# Patient Record
Sex: FEMALE | Race: WHITE | NOT HISPANIC OR LATINO | Employment: OTHER | URBAN - METROPOLITAN AREA
[De-identification: names, ages, dates, MRNs, and addresses within clinical notes are randomized per-mention and may not be internally consistent; named-entity substitution may affect disease eponyms.]

---

## 2019-01-02 ENCOUNTER — DOCUMENTATION (OUTPATIENT)
Dept: OTHER | Facility: HOSPITAL | Age: 64
End: 2019-01-02

## 2019-01-02 NOTE — PROGRESS NOTES
I saw patient with Dr PATINO and patients  - Dr PATINO reviewed the pathology report and surgical options- left breast IB, HG IDC, triple negative - the patient prefers BCT - an MRI has been ordered as well genetic referral. The patients sister was diagnosed with breast cancer at age 45 and did not have genetic testing - educational and supportive materials were reviewed and given - patient consented for the decision study. Dr PATINO discussed the possibility of chemotherapy due to the cancer being triple negative and the patient states she wants to see Dr Braun.-

## 2019-01-07 ENCOUNTER — DOCUMENTATION (OUTPATIENT)
Dept: OTHER | Facility: HOSPITAL | Age: 64
End: 2019-01-07

## 2019-01-07 NOTE — PROGRESS NOTES
Patient called and left message for me to call her @ [phone]. I called and no answer and mail box was full - I called the other number for patient and spoke to her  - he said he would have her call me if he could get her.

## 2019-01-08 ENCOUNTER — APPOINTMENT (OUTPATIENT)
Dept: OTHER | Facility: HOSPITAL | Age: 64
End: 2019-01-08
Attending: SURGERY

## 2019-01-08 ENCOUNTER — HOSPITAL ENCOUNTER (OUTPATIENT)
Dept: MRI IMAGING | Facility: HOSPITAL | Age: 64
Discharge: HOME | End: 2019-01-08
Attending: SURGERY | Admitting: SURGERY

## 2019-01-08 ENCOUNTER — LAB (OUTPATIENT)
Dept: LAB | Facility: HOSPITAL | Age: 64
End: 2019-01-08

## 2019-01-08 ENCOUNTER — CLINICAL SUPPORT (OUTPATIENT)
Dept: GENETICS | Facility: HOSPITAL | Age: 64
End: 2019-01-08

## 2019-01-08 DIAGNOSIS — C50.912 MALIGNANT NEOPLASM OF LEFT BREAST IN FEMALE, ESTROGEN RECEPTOR NEGATIVE, UNSPECIFIED SITE OF BREAST (HCC): Primary | ICD-10-CM

## 2019-01-08 DIAGNOSIS — Z80.0 FAMILY HISTORY OF COLON CANCER: ICD-10-CM

## 2019-01-08 DIAGNOSIS — Z80.3 FAMILY HISTORY OF BREAST CANCER: ICD-10-CM

## 2019-01-08 DIAGNOSIS — C50.812 MALIGNANT NEOPLASM OF OVERLAPPING SITES OF LEFT FEMALE BREAST (HCC): ICD-10-CM

## 2019-01-08 DIAGNOSIS — Z17.1 MALIGNANT NEOPLASM OF LEFT BREAST IN FEMALE, ESTROGEN RECEPTOR NEGATIVE, UNSPECIFIED SITE OF BREAST (HCC): Primary | ICD-10-CM

## 2019-01-08 DIAGNOSIS — Z92.89 H/O MAMMOGRAM: ICD-10-CM

## 2019-01-08 DIAGNOSIS — Z13.79 GENETIC TESTING: Primary | ICD-10-CM

## 2019-01-08 PROCEDURE — 82565 ASSAY OF CREATININE: CPT

## 2019-01-08 PROCEDURE — 96040: CPT | Performed by: GENETIC COUNSELOR, MS

## 2019-01-08 PROCEDURE — A9577 INJ MULTIHANCE: HCPCS | Performed by: SURGERY

## 2019-01-08 PROCEDURE — 0 GADOBENATE DIMEGLUMINE 529 MG/ML SOLUTION: Performed by: SURGERY

## 2019-01-08 PROCEDURE — C8908 MRI W/O FOL W/CONT, BREAST,: HCPCS

## 2019-01-08 PROCEDURE — 77049 MRI BREAST C-+ W/CAD BI: CPT | Performed by: RADIOLOGY

## 2019-01-08 RX ADMIN — GADOBENATE DIMEGLUMINE 18 ML: 529 INJECTION, SOLUTION INTRAVENOUS at 13:44

## 2019-01-08 NOTE — PROGRESS NOTES
Ava Mota, a 63-year-old female, was seen for genetic counseling due to a personal history of breast cancer. Ms. 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 She is making a surgical decision. She retains her uterus and ovaries. She was interested in discussing her risk for a hereditary cancer syndrome.  Ms. Mota was interested in pursuing a multi-gene panel to evaluate her risk of cancer, therefore the CancerNext panel was ordered through [facility] which analyzes BRCA1/2 and 32 additional genes associated with an increased cancer risk. The genes on this panel include APC, JESUS, BARD1, BMPR1A, BRCA1, BRCA2, BRIP1, CDH1, CDK4, CDKN2A, CHEK2, DICER1, EPCAM, GREM1, HOXB13, MLH1, MRE11A, MSH2, MSH6, MUTYH, NBN, NF1, PALB2, PMS2, POLD1, POLE, PTEN, RAD50, RAD51C, RAD51D, SMAD4, SMARCA4, STK11, and TP53. Results are expected within 2-3 weeks.     PERTINENT FAMILY HISTORY: (See attached pedigree)   Sister:     Breast cancer, 45  Sister:     Colon cancer, 56  Brother:    Lung cancer, 57  Mat. 1st cousin:   Breast cancer, 68  Mat. 1st cousin once removed: Breast cancer, 50    We do not have medical records regarding any of these diagnoses.      RISK ASSESSMENT:  Ms. Mota’s personal and family history of cancer led to concern for a hereditary cancer syndrome. We discussed BRCA1/2 testing as well as the option of pursuing a panel that would test for other genes known to impact cancer risk in addition to BRCA1/2.   Ms. Mota clearly meets NCCN guidelines criteria for BRCA1/2 testing based on her personal history of breast cancer and family history of breast cancer in a close relative diagnosed before age 50. These risk assessments are based on the family history information provided at the time of the appointment, and could change in the future should new information be obtained.    GENETIC COUNSELING (30 minutes):  We reviewed the family history information in  detail. Cases of cancer follow three general patterns: sporadic, familial, and hereditary.  While most cancer is sporadic, some cases appear to occur in family clusters.  These cases are said to be familial and account for 10-20% of cancer cases.  Familial cases may be due to a combination of shared genes and environmental factors among family members.  In even fewer families, the cancer is said to be inherited, and the genes responsible for the cancer are known.      Family histories typical of hereditary cancer syndromes usually include multiple first- and second-degree relatives diagnosed with cancer types that define a syndrome.  These cases tend to be diagnosed at younger-than-expected ages and can be bilateral or multifocal.  The cancer in these families follows an autosomal dominant inheritance pattern, which indicates the likely presence of a mutation in a cancer susceptibility gene.  Children and siblings of an individual believed to carry this mutation have a 50% chance of inheriting that mutation, thereby inheriting the increased risk to develop cancer.  These mutations can be passed down from the maternal or the paternal lineage.    Hereditary breast cancer accounts for 5-10% of all cases of breast cancer.  A significant proportion of hereditary breast and ovarian cancer can be attributed to mutations in the BRCA1 and BRCA2 genes.  Mutations in these genes confer an increased risk for breast cancer, ovarian cancer, male breast cancer, prostate cancer, and pancreatic cancer. Women with a BRCA1 or BRCA2 mutation who have already been diagnosed with breast cancer have a 40-60% lifetime risk of a second breast cancer. Women with a BRCA1 or BRCA2 mutation have up to a 44% risk of ovarian cancer.      We discussed that there are other genes that are known to be associated with an increased risk for cancer.  Some of these genes have well defined cancer risks and established management guidelines.  Other genes  that can be tested for have been more recently described, and there may be less data regarding the risks and therefore may not have established management guidelines. We discussed these limitations at length.  Based on Ms. Mota’s desire to get as much information as possible regarding her personal risks and potential risks for her family, she opted to pursue testing through a panel that would look at several other genes known to increase the risk for cancer.    GENETIC TESTING:  The risks, benefits and limitations of genetic testing and implications for clinical management following testing were reviewed.  DNA test results can influence decisions regarding screening, prevention and surgical management.  Genetic testing can have significant psychological implications for both individuals and families.  Also discussed was the possibility of employment and insurance discrimination based on genetic test results and the laws in place to prevent this ([place]).    We discussed panel testing, which would involve testing for BRCA1/2 as well as 32 additional genes that are associated with increased cancer risk. The benefits and limitations of genetic testing were discussed and Ms. Mota decided to pursue testing via the panel. The implications of a positive or negative test result were discussed. We discussed the possibility that, in some cases, genetic test results may be informative or may be ambiguous due to the identification of a genetic variant. These variants may or may not be associated with an increased cancer risk.  With multigene panel testing, it is not uncommon for a variant of uncertain significance (VUS) to be identified.  If a VUS is identified, testing family members is typically not recommended and screening recommendations are made based on the family history.  The laboratories that perform genetic testing work to reclassify the VUS and send out an amended report if and when a VUS is reclassified.  The  majority of variant findings are ultimately reclassified to a negative result.  Given her personal and family history, a negative test result would not eliminate all cancer risk to her relatives, although the risk would not be as high as it would with positive genetic testing.      PLAN: Genetic testing via the CancerNext panel through [facility] was ordered and results are expected within 2-3 weeks. We will call her to discuss these results once they are received. Ms. Mota is welcome to contact us in the meantime with any questions she may have.      Lupe Garza MS, [facility]  Licensed Certified Genetic Counselor

## 2019-01-09 ENCOUNTER — TELEPHONE (OUTPATIENT)
Dept: MRI IMAGING | Facility: HOSPITAL | Age: 64
End: 2019-01-09

## 2019-01-09 LAB — CREAT BLDA-MCNC: 0.7 MG/DL (ref 0.6–1.3)

## 2019-01-09 NOTE — TELEPHONE ENCOUNTER
Called pt with Breast MRI results. Recommended surgical follow up. Pt expressed understanding and was encouraged to call with any further questions or concerns.

## 2019-01-10 ENCOUNTER — LAB REQUISITION (OUTPATIENT)
Dept: LAB | Facility: HOSPITAL | Age: 64
End: 2019-01-10

## 2019-01-10 DIAGNOSIS — C50.812 MALIGNANT NEOPLASM OF OVERLAPPING SITES OF LEFT FEMALE BREAST (HCC): ICD-10-CM

## 2019-01-10 LAB
CYTO UR: NORMAL
LAB AP CASE REPORT: NORMAL
LAB AP DIAGNOSIS COMMENT: NORMAL
PATH REPORT.FINAL DX SPEC: NORMAL
PATH REPORT.GROSS SPEC: NORMAL

## 2019-01-10 PROCEDURE — 88321 CONSLTJ&REPRT SLD PREP ELSWR: CPT | Performed by: SURGERY

## 2019-01-15 ENCOUNTER — DOCUMENTATION (OUTPATIENT)
Dept: GENETICS | Facility: HOSPITAL | Age: 64
End: 2019-01-15

## 2019-01-15 NOTE — PROGRESS NOTES
Ava Mota, a 63-year-old female, was seen for genetic counseling due to a personal history of breast cancer. Ms. 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 She is making a surgical decision. She retains her uterus and ovaries. She was interested in discussing her risk for a hereditary cancer syndrome.  Ms. Mota was interested in pursuing a multi-gene panel to evaluate her risk of cancer, therefore the CancerNext panel was ordered through [facility] which analyzes BRCA1/2 and 32 additional genes associated with an increased cancer risk. The genes on this panel include APC, JESUS, BARD1, BMPR1A, BRCA1, BRCA2, BRIP1, CDH1, CDK4, CDKN2A, CHEK2, DICER1, EPCAM, GREM1, HOXB13, MLH1, MRE11A, MSH2, MSH6, MUTYH, NBN, NF1, PALB2, PMS2, POLD1, POLE, PTEN, RAD50, RAD51C, RAD51D, SMAD4, SMARCA4, STK11, and TP53. Genetic testing was negative for known deleterious mutations in BRCA1/2 and 32 additional genes on the CancerNext panel (See attached).  These normal results were discussed with Ms. Mota by telephone on 1/15/19.      PERTINENT FAMILY HISTORY: (See attached pedigree)   Sister:     Breast cancer, 45  Sister:     Colon cancer, 56  Brother:    Lung cancer, 57  Mat. 1st cousin:   Breast cancer, 68  Mat. 1st cousin once removed: Breast cancer, 50    We do not have medical records regarding any of these diagnoses.      RISK ASSESSMENT:  Ms. Mota’s personal and family history of cancer led to concern for a hereditary cancer syndrome. We discussed BRCA1/2 testing as well as the option of pursuing a panel that would test for other genes known to impact cancer risk in addition to BRCA1/2.   Ms. Mota clearly meets NCCN guidelines criteria for BRCA1/2 testing based on her personal history of breast cancer and family history of breast cancer in a close relative diagnosed before age 50. These risk assessments are based on the family history information provided at the  time of the appointment, and could change in the future should new information be obtained.    GENETIC COUNSELING:  We reviewed the family history information in detail. Cases of cancer follow three general patterns: sporadic, familial, and hereditary.  While most cancer is sporadic, some cases appear to occur in family clusters.  These cases are said to be familial and account for 10-20% of cancer cases.  Familial cases may be due to a combination of shared genes and environmental factors among family members.  In even fewer families, the cancer is said to be inherited, and the genes responsible for the cancer are known.      Family histories typical of hereditary cancer syndromes usually include multiple first- and second-degree relatives diagnosed with cancer types that define a syndrome.  These cases tend to be diagnosed at younger-than-expected ages and can be bilateral or multifocal.  The cancer in these families follows an autosomal dominant inheritance pattern, which indicates the likely presence of a mutation in a cancer susceptibility gene.  Children and siblings of an individual believed to carry this mutation have a 50% chance of inheriting that mutation, thereby inheriting the increased risk to develop cancer.  These mutations can be passed down from the maternal or the paternal lineage.    Hereditary breast cancer accounts for 5-10% of all cases of breast cancer.  A significant proportion of hereditary breast and ovarian cancer can be attributed to mutations in the BRCA1 and BRCA2 genes.  Mutations in these genes confer an increased risk for breast cancer, ovarian cancer, male breast cancer, prostate cancer, and pancreatic cancer. Women with a BRCA1 or BRCA2 mutation who have already been diagnosed with breast cancer have a 40-60% lifetime risk of a second breast cancer. Women with a BRCA1 or BRCA2 mutation have up to a 44% risk of ovarian cancer.      We discussed that there are other genes that are  known to be associated with an increased risk for cancer.  Some of these genes have well defined cancer risks and established management guidelines.  Other genes that can be tested for have been more recently described, and there may be less data regarding the risks and therefore may not have established management guidelines. We discussed these limitations at length.  Based on Ms. Mota’s desire to get as much information as possible regarding her personal risks and potential risks for her family, she opted to pursue testing through a panel that would look at several other genes known to increase the risk for cancer.    GENETIC TESTING:  The risks, benefits and limitations of genetic testing and implications for clinical management following testing were reviewed.  DNA test results can influence decisions regarding screening, prevention and surgical management.  Genetic testing can have significant psychological implications for both individuals and families.  Also discussed was the possibility of employment and insurance discrimination based on genetic test results and the laws in place to prevent this ([place]).    We discussed panel testing, which would involve testing for BRCA1/2 as well as 32 additional genes that are associated with increased cancer risk. The benefits and limitations of genetic testing were discussed and Ms. Mota decided to pursue testing via the panel. The implications of a positive or negative test result were discussed. We discussed the possibility that, in some cases, genetic test results may be informative or may be ambiguous due to the identification of a genetic variant. These variants may or may not be associated with an increased cancer risk.  With multigene panel testing, it is not uncommon for a variant of uncertain significance (VUS) to be identified.  If a VUS is identified, testing family members is typically not recommended and screening recommendations are made based on the  family history.  The laboratories that perform genetic testing work to reclassify the VUS and send out an amended report if and when a VUS is reclassified.  The majority of variant findings are ultimately reclassified to a negative result.  Given her personal and family history, a negative test result would not eliminate all cancer risk to her relatives, although the risk would not be as high as it would with positive genetic testing.      TEST RESULTS:  Genetic testing was negative by sequencing and deletion/duplication testing for mutations in BRCA1/2 and the additional 32 genes on the CancerNext panel.   The negative result greatly lowers but does not eliminate the risk of a hereditary cancer syndrome for Ms. Mota. This assessment is based on the information provided at the time of the consultation.    CLINICAL MANAGEMENT GUIDELINES: Ms. Mota’s management and surveillance should be determined by her oncology team. Per NCCN guidelines, individuals who have a first-degree relative with colon cancer at any age should begin colonoscopy screening at 40 or ten years prior to the youngest diagnosis in the family, and repeat every 5 years or more frequently based on personal clinical findings. Ms. Mota reports she is due for a colonoscopy this year.     Despite the negative genetic test results, Ms. Mota’s female relatives may have a somewhat increased lifetime risk for breast cancer based on family history.  Given the increased risk, options available to individuals with a high lifetime risk for breast cancer were briefly discussed.  This includes increased surveillance and chemoprevention. Surveillance for individuals with a high lifetime risk of breast cancer (>20%, versus the average risk of 12%), based on NCCN guidelines, would consist of semi-annual clinical breast exams and monthly self-breast exams starting by age 18 and annual mammography starting 10 years younger than the earliest diagnosis in a close  relative, or starting by age 40.  According to an American Cancer Society expert panel, annual breast MRI should be offered to women whose lifetime risk of breast cancer is 20-25 percent or more, also starting by age 40 or earlier if indicated by family history.  Female relatives could have a risk assessment performed using a family history-based model, such as the Tyrer-Cuzick model, to determine their individual risks.      PLAN: Genetic counseling remains available to Ms. Mota. She is encouraged to contact us if she has any questions or concerns at [phone].      Lupe Garza MS, [facility] [place], [facility]  Licensed Certified Genetic Counselor     Cc: Ava Angel MD

## 2019-01-24 ENCOUNTER — TRANSCRIBE ORDERS (OUTPATIENT)
Dept: MAMMOGRAPHY | Facility: HOSPITAL | Age: 64
End: 2019-01-24

## 2019-01-24 ENCOUNTER — TRANSCRIBE ORDERS (OUTPATIENT)
Dept: ADMINISTRATIVE | Facility: HOSPITAL | Age: 64
End: 2019-01-24

## 2019-01-24 DIAGNOSIS — C50.812 MALIGNANT NEOPLASM OF OVERLAPPING SITES OF LEFT FEMALE BREAST, UNSPECIFIED ESTROGEN RECEPTOR STATUS (HCC): Primary | ICD-10-CM

## 2019-01-24 DIAGNOSIS — Z17.1 MALIGNANT NEOPLASM OF OVERLAPPING SITES OF LEFT BREAST IN FEMALE, ESTROGEN RECEPTOR NEGATIVE (HCC): Primary | ICD-10-CM

## 2019-01-24 DIAGNOSIS — C50.812 MALIGNANT NEOPLASM OF OVERLAPPING SITES OF LEFT BREAST IN FEMALE, ESTROGEN RECEPTOR NEGATIVE (HCC): Primary | ICD-10-CM

## 2019-02-06 ENCOUNTER — APPOINTMENT (OUTPATIENT)
Dept: PREADMISSION TESTING | Facility: HOSPITAL | Age: 64
End: 2019-02-06

## 2019-02-06 LAB
ALBUMIN SERPL-MCNC: 4.26 G/DL (ref 3.2–4.8)
ALBUMIN/GLOB SERPL: 1.7 G/DL (ref 1.5–2.5)
ALP SERPL-CCNC: 108 U/L (ref 25–100)
ALT SERPL W P-5'-P-CCNC: 22 U/L (ref 7–40)
ANION GAP SERPL CALCULATED.3IONS-SCNC: 4 MMOL/L (ref 3–11)
AST SERPL-CCNC: 20 U/L (ref 0–33)
BILIRUB SERPL-MCNC: 0.8 MG/DL (ref 0.3–1.2)
BUN BLD-MCNC: 18 MG/DL (ref 9–23)
BUN/CREAT SERPL: 22.8 (ref 7–25)
CALCIUM SPEC-SCNC: 9.3 MG/DL (ref 8.7–10.4)
CHLORIDE SERPL-SCNC: 107 MMOL/L (ref 99–109)
CO2 SERPL-SCNC: 30 MMOL/L (ref 20–31)
CREAT BLD-MCNC: 0.79 MG/DL (ref 0.6–1.3)
DEPRECATED RDW RBC AUTO: 42.7 FL (ref 37–54)
ERYTHROCYTE [DISTWIDTH] IN BLOOD BY AUTOMATED COUNT: 13.3 % (ref 11.3–14.5)
GFR SERPL CREATININE-BSD FRML MDRD: 73 ML/MIN/1.73
GLOBULIN UR ELPH-MCNC: 2.4 GM/DL
GLUCOSE BLD-MCNC: 153 MG/DL (ref 70–100)
HCT VFR BLD AUTO: 43.3 % (ref 34.5–44)
HGB BLD-MCNC: 14.1 G/DL (ref 11.5–15.5)
MCH RBC QN AUTO: 28.6 PG (ref 27–31)
MCHC RBC AUTO-ENTMCNC: 32.6 G/DL (ref 32–36)
MCV RBC AUTO: 87.8 FL (ref 80–99)
PLATELET # BLD AUTO: 299 10*3/MM3 (ref 150–450)
PMV BLD AUTO: 9.6 FL (ref 6–12)
POTASSIUM BLD-SCNC: 4.5 MMOL/L (ref 3.5–5.5)
PROT SERPL-MCNC: 6.7 G/DL (ref 5.7–8.2)
RBC # BLD AUTO: 4.93 10*6/MM3 (ref 3.89–5.14)
SODIUM BLD-SCNC: 141 MMOL/L (ref 132–146)
WBC NRBC COR # BLD: 7.15 10*3/MM3 (ref 3.5–10.8)

## 2019-02-06 PROCEDURE — 80053 COMPREHEN METABOLIC PANEL: CPT | Performed by: SURGERY

## 2019-02-06 PROCEDURE — 93010 ELECTROCARDIOGRAM REPORT: CPT | Performed by: INTERNAL MEDICINE

## 2019-02-06 PROCEDURE — 85027 COMPLETE CBC AUTOMATED: CPT | Performed by: SURGERY

## 2019-02-06 PROCEDURE — 36415 COLL VENOUS BLD VENIPUNCTURE: CPT

## 2019-02-06 PROCEDURE — 93005 ELECTROCARDIOGRAM TRACING: CPT

## 2019-02-08 ENCOUNTER — HOSPITAL ENCOUNTER (OUTPATIENT)
Dept: MAMMOGRAPHY | Facility: HOSPITAL | Age: 64
Discharge: HOME | End: 2019-02-08
Attending: SURGERY | Admitting: RADIOLOGY

## 2019-02-08 ENCOUNTER — HOSPITAL ENCOUNTER (OUTPATIENT)
Dept: MAMMOGRAPHY | Facility: HOSPITAL | Age: 64
Discharge: HOME | End: 2019-02-08

## 2019-02-08 ENCOUNTER — HOSPITAL ENCOUNTER (OUTPATIENT)
Dept: ULTRASOUND IMAGING | Facility: HOSPITAL | Age: 64
Discharge: HOME | End: 2019-02-08

## 2019-02-08 ENCOUNTER — LAB REQUISITION (OUTPATIENT)
Dept: LAB | Facility: HOSPITAL | Age: 64
End: 2019-02-08

## 2019-02-08 ENCOUNTER — HOSPITAL ENCOUNTER (OUTPATIENT)
Dept: NUCLEAR MEDICINE | Facility: HOSPITAL | Age: 64
Discharge: HOME | End: 2019-02-08
Attending: SURGERY

## 2019-02-08 DIAGNOSIS — C50.812 MALIGNANT NEOPLASM OF OVERLAPPING SITES OF LEFT BREAST IN FEMALE, ESTROGEN RECEPTOR NEGATIVE (HCC): ICD-10-CM

## 2019-02-08 DIAGNOSIS — Z17.1 MALIGNANT NEOPLASM OF OVERLAPPING SITES OF LEFT BREAST IN FEMALE, ESTROGEN RECEPTOR NEGATIVE (HCC): ICD-10-CM

## 2019-02-08 DIAGNOSIS — C50.812 MALIGNANT NEOPLASM OF OVERLAPPING SITES OF LEFT FEMALE BREAST (HCC): ICD-10-CM

## 2019-02-08 DIAGNOSIS — C50.812 MALIGNANT NEOPLASM OF OVERLAPPING SITES OF LEFT FEMALE BREAST, UNSPECIFIED ESTROGEN RECEPTOR STATUS (HCC): ICD-10-CM

## 2019-02-08 PROCEDURE — 77065 DX MAMMO INCL CAD UNI: CPT | Performed by: RADIOLOGY

## 2019-02-08 PROCEDURE — 88341 IMHCHEM/IMCYTCHM EA ADD ANTB: CPT | Performed by: SURGERY

## 2019-02-08 PROCEDURE — 19285 PERQ DEV BREAST 1ST US IMAG: CPT | Performed by: RADIOLOGY

## 2019-02-08 PROCEDURE — 0 TECHNETIUM FILTERED SULFUR COLLOID: Performed by: SURGERY

## 2019-02-08 PROCEDURE — 88307 TISSUE EXAM BY PATHOLOGIST: CPT | Performed by: SURGERY

## 2019-02-08 PROCEDURE — A9541 TC99M SULFUR COLLOID: HCPCS | Performed by: SURGERY

## 2019-02-08 PROCEDURE — 88342 IMHCHEM/IMCYTCHM 1ST ANTB: CPT | Performed by: SURGERY

## 2019-02-08 PROCEDURE — 88360 TUMOR IMMUNOHISTOCHEM/MANUAL: CPT | Performed by: SURGERY

## 2019-02-08 PROCEDURE — 76098 X-RAY EXAM SURGICAL SPECIMEN: CPT

## 2019-02-08 PROCEDURE — 38792 RA TRACER ID OF SENTINL NODE: CPT

## 2019-02-08 PROCEDURE — 76098 X-RAY EXAM SURGICAL SPECIMEN: CPT | Performed by: RADIOLOGY

## 2019-02-08 RX ORDER — LIDOCAINE HYDROCHLORIDE 10 MG/ML
5 INJECTION, SOLUTION INFILTRATION; PERINEURAL ONCE
Status: COMPLETED | OUTPATIENT
Start: 2019-02-08 | End: 2019-02-08

## 2019-02-08 RX ADMIN — LIDOCAINE HYDROCHLORIDE 5 ML: 10 INJECTION, SOLUTION INFILTRATION; PERINEURAL at 09:45

## 2019-02-08 RX ADMIN — TECHNETIUM TC 99M SULFUR COLLOID 1 DOSE: KIT at 14:35

## 2019-02-08 RX ADMIN — METHYLENE BLUE 10 MG: 10 INJECTION INTRAVENOUS at 09:47

## 2019-02-12 LAB
CYTO UR: NORMAL
LAB AP CASE REPORT: NORMAL
LAB AP CLINICAL INFORMATION: NORMAL
LAB AP SPECIAL STAINS: NORMAL
PATH REPORT.FINAL DX SPEC: NORMAL
PATH REPORT.GROSS SPEC: NORMAL

## 2019-02-13 ENCOUNTER — HOSPITAL ENCOUNTER (OUTPATIENT)
Dept: RADIATION ONCOLOGY | Facility: HOSPITAL | Age: 64
Setting detail: RADIATION/ONCOLOGY SERIES
Discharge: HOME | End: 2019-02-13

## 2019-02-13 ENCOUNTER — OFFICE VISIT (OUTPATIENT)
Dept: RADIATION ONCOLOGY | Facility: HOSPITAL | Age: 64
End: 2019-02-13

## 2019-02-13 DIAGNOSIS — C50.412 MALIGNANT NEOPLASM OF UPPER-OUTER QUADRANT OF LEFT BREAST IN FEMALE, ESTROGEN RECEPTOR NEGATIVE (HCC): Primary | ICD-10-CM

## 2019-02-13 DIAGNOSIS — Z17.1 MALIGNANT NEOPLASM OF UPPER-OUTER QUADRANT OF LEFT BREAST IN FEMALE, ESTROGEN RECEPTOR NEGATIVE (HCC): Primary | ICD-10-CM

## 2019-02-13 PROCEDURE — G0463 HOSPITAL OUTPT CLINIC VISIT: HCPCS

## 2019-02-15 ENCOUNTER — OFFICE VISIT (OUTPATIENT)
Dept: ONCOLOGY | Facility: CLINIC | Age: 64
End: 2019-02-15

## 2019-02-15 VITALS
WEIGHT: 205 LBS | HEART RATE: 81 BPM | TEMPERATURE: 97.4 F | DIASTOLIC BLOOD PRESSURE: 80 MMHG | RESPIRATION RATE: 16 BRPM | HEIGHT: 62 IN | SYSTOLIC BLOOD PRESSURE: 154 MMHG | OXYGEN SATURATION: 97 % | BODY MASS INDEX: 37.73 KG/M2

## 2019-02-15 DIAGNOSIS — C50.412 MALIGNANT NEOPLASM OF UPPER-OUTER QUADRANT OF LEFT BREAST IN FEMALE, ESTROGEN RECEPTOR NEGATIVE (HCC): Primary | ICD-10-CM

## 2019-02-15 DIAGNOSIS — Z17.1 MALIGNANT NEOPLASM OF UPPER-OUTER QUADRANT OF LEFT BREAST IN FEMALE, ESTROGEN RECEPTOR NEGATIVE (HCC): Primary | ICD-10-CM

## 2019-02-15 PROCEDURE — 99205 OFFICE O/P NEW HI 60 MIN: CPT | Performed by: INTERNAL MEDICINE

## 2019-02-15 RX ORDER — PALONOSETRON 0.05 MG/ML
0.25 INJECTION, SOLUTION INTRAVENOUS ONCE
Status: CANCELLED | OUTPATIENT
Start: 2019-03-07

## 2019-02-15 RX ORDER — ONDANSETRON HYDROCHLORIDE 8 MG/1
8 TABLET, FILM COATED ORAL 3 TIMES DAILY PRN
Qty: 30 TABLET | Refills: 5 | Status: SHIPPED | OUTPATIENT
Start: 2019-02-15 | End: 2020-01-02

## 2019-02-15 RX ORDER — DEXAMETHASONE 4 MG/1
TABLET ORAL
Qty: 50 TABLET | Refills: 3 | Status: SHIPPED | OUTPATIENT
Start: 2019-02-15 | End: 2019-05-30

## 2019-02-15 RX ORDER — FLUTICASONE PROPIONATE 50 MCG
SPRAY, SUSPENSION (ML) NASAL
Refills: 0 | COMMUNITY
Start: 2019-02-03

## 2019-02-15 RX ORDER — IRBESARTAN 300 MG/1
300 TABLET ORAL DAILY
Refills: 0 | COMMUNITY
Start: 2019-02-11 | End: 2021-12-17

## 2019-02-15 RX ORDER — VALSARTAN 160 MG/1
160 TABLET ORAL DAILY
Refills: 0 | COMMUNITY
Start: 2019-02-13 | End: 2020-01-02

## 2019-02-15 RX ORDER — SODIUM CHLORIDE 9 MG/ML
250 INJECTION, SOLUTION INTRAVENOUS ONCE
Status: CANCELLED | OUTPATIENT
Start: 2019-03-07

## 2019-02-15 NOTE — PROGRESS NOTES
ID: 63 y.o. year old female from [facility] [id number]    PCP: Liliane Hooper MD    REFERRING PHYSICIAN: Rehan Angel MD    Reason for Consultation: Stage IB triple negative breast cancer of the left breast status post lumpectomy    Dear Dr. PATINO    It is a pleasure to meet Mrs. Mota today.  As you know she is a very pleasant 63-year-old lady that I'm seeing in consultation for her triple negative breast cancer after having a lumpectomy performed.  She reports otherwise been in good health.  She does have some diabetes and hypertension but otherwise no significant chronic medical issues.  Pathology revealed this mass to be 0.6 cm in largest dimension, it did have a highly burton Kelley Thomas score.  It also was negative for estrogen and progesterone receptors and HER-2/jakob negative.  She presents today to discuss adjuvant therapy going forward.  She spoke with Dr. Beverly Rogers who is planning to do adjuvant radiotherapy once we are done with chemotherapy.      Past Medical History:   Diagnosis Date   • Diabetes mellitus (CMS/HCC)    • Hypertension        Past Surgical History:   Procedure Laterality Date   • CARPAL TUNNEL RELEASE Left 2015   •  SECTION      x2   • CHOLECYSTECTOMY  1973   • COLONOSCOPY  2013   • KNEE ARTHROPLASTY Bilateral      &    • TUBAL ABDOMINAL LIGATION  1985       Social History     Socioeconomic History   • Marital status:      Spouse name: Not on file   • Number of children: Not on file   • Years of education: Not on file   • Highest education level: Not on file   Tobacco Use   • Smoking status: Former Smoker     Packs/day: 0.50     Years: 2.00     Pack years: 1.00     Types: Cigarettes     Last attempt to quit: 1972     Years since quittin.1   • Smokeless tobacco: Never Used   Substance and Sexual Activity   • Alcohol use: No     Frequency: Never   • Drug use: No   • Sexual activity: Defer       Family History   Problem  Relation Age of Onset   • Diabetes Mother    • Heart disease Mother    • Stroke Mother    • Emphysema Father    • Breast cancer Sister    • Lung cancer Brother    • Colon cancer Sister        Review of Systems:    16 point review of systems was performed and reviewed and scanned into the EMR    Review of Systems   Constitutional: Negative.    HENT:  Negative.    Eyes: Negative.    Respiratory: Negative.    Cardiovascular: Negative.    Gastrointestinal: Negative.    Endocrine: Negative.    Genitourinary: Negative.     Musculoskeletal: Negative.    Skin: Negative.    Neurological: Negative.    Hematological: Negative.    Psychiatric/Behavioral: Negative.          Current Outpatient Medications:   •  fluticasone (FLONASE) 50 MCG/ACT nasal spray, , Disp: , Rfl: 0  •  HYDROcodone-acetaminophen (NORCO) 5-325 MG per tablet, Take 1-2 tablets by mouth Every 4 to 6 Hours As Needed for Pain, Disp: 14 tablet, Rfl: 0  •  insulin NPH (humuLIN N,novoLIN N) 100 UNIT/ML injection, Inject  under the skin into the appropriate area as directed 2 (Two) Times a Day Before Meals., Disp: , Rfl:   •  irbesartan (AVAPRO) 300 MG tablet, Take 300 mg by mouth Daily., Disp: , Rfl: 0  •  RA ALLERGY RELIEF 10 MG tablet, Take 10 mg by mouth Daily., Disp: , Rfl: 0  •  valsartan (DIOVAN) 160 MG tablet, Take 160 mg by mouth Daily., Disp: , Rfl: 0  •  dexamethasone (DECADRON) 4 MG tablet, Take 2 tablets oral twice a day for 3 consecutive days beginning the day before chemotherapy and continue for 6 doses., Disp: 50 tablet, Rfl: 3  •  ondansetron (ZOFRAN) 8 MG tablet, Take 1 tablet by mouth 3 (Three) Times a Day As Needed for Nausea or Vomiting., Disp: 30 tablet, Rfl: 5    Allergies   Allergen Reactions   • Iodine Anaphylaxis     PT STATES SHE 'PASSED OUT ' AFTER CT CONTRAST.        ECOG SCORE: 0    Objective     Vitals:    02/15/19 1454   BP: 154/80   Pulse: 81   Resp: 16   Temp: 97.4 °F (36.3 °C)   SpO2: 97%       Physical Exam    General: well  appearing, in no acute distress  HEENT: sclera anicteric, oropharynx clear, neck is supple  Lymphatics: no cervical, supraclavicular, or axillary adenopathy  Cardiovascular: regular rate and rhythm, no murmurs, rubs or gallops  Lungs: clear to auscultation bilaterally  Abdomen: soft, nontender, nondistended.  No palpable organomegaly  Extremities: no lower extremity edema  Skin: no rashes, lesions, bruising, or petechiae  Msk:  Shows no weakness of the large muscle groups  Psych: Mood is stable      Lab Results   Component Value Date    GLUCOSE 153 (H) 02/06/2019    BUN 18 02/06/2019    CREATININE 0.79 02/06/2019     02/06/2019    K 4.5 02/06/2019     02/06/2019    CO2 30.0 02/06/2019    CALCIUM 9.3 02/06/2019    PROTEINTOT 6.7 02/06/2019    ALBUMIN 4.26 02/06/2019    BILITOT 0.8 02/06/2019    ALKPHOS 108 (H) 02/06/2019    AST 20 02/06/2019    ALT 22 02/06/2019       Lab Results   Component Value Date    HGB 14.1 02/06/2019    HCT 43.3 02/06/2019    MCV 87.8 02/06/2019     02/06/2019    WBC 7.15 02/06/2019       Mri Breast Bilateral Diagnostic With & Without Contrast    Result Date: 1/8/2019  1. Biopsy-proven 0.5-0.6 cm invasive ductal carcinoma in the far posterior left 3:00 region. There is no evidence of multifocal/multicentric disease in the left breast. 2. Negative right breast MRI. 3. No MRI evidence of lymphadenopathy.  RECOMMENDATIONS: Surgical follow-up with Dr. Angel.   BI-RADS CATEGORY: 6, KNOWN BIOPSY PROVEN MALIGNANCY.  FINAL MRI RESULTS AND RECOMMENDATIONS WILL BE CALLED TO THE PATIENT BY OUR LEAD BREAST MRI TECHNOLOGIST.  This report was finalized on 1/8/2019 4:37 PM by Dr. Patti Quinonez MD.      Nm [place] Node Injection Only    Result Date: 2/8/2019  Radiopharmaceutical filtered sulfur colloid used for the injection for left breast lymphoscintigraphy and sentinel node mapping.  DICTATED:   2/8/2019 EDITED/ls :   2/8/2019    This report was finalized on 2/8/2019 4:42 PM by  Dr. Fish Diaz.      Lab Results   Component Value Date    FINALDX  02/08/2019     1. LEFT BREAST, LUMPECTOMY:  Invasive ductal carcinoma, grade 3 (See synoptic report below).  Tumor measures 0.6 cm in greatest dimension  Tumor extends to within 0.2 cm of the inferior margin.  Single focus of high-grade ductal carcinoma in-situ.   Estrogen receptor negative by immunohistochemistry.   Progesterone receptor negative by immunohistochemistry.   HER-2/jakob negative by immunohistochemistry.   2. LEFT BREAST, EXTENDED MEDIAL MARGIN:  Benign breast tissue.   Negative for in-situ or invasive carcinoma.   3. SENTINEL LYMPH NODE:  One benign sentinel lymph node. (0/1)  Negative for metastatic carcinoma.       INVASIVE BREAST CANCER STAGING TEMPLATE:  TYPE OF SPECIMEN/PROCEDURE:  Oriented lumpectomy  SPECIMEN LATERALITY: Left   TUMOR SITE: 3 o'clock   TUMOR SIZE: (Size of Largest Invasive Carcinoma): 0.6 x 0.4 x 0.2 cm  HISTOLOGIC TYPE OF INVASIVE CARCINOMA:  Invasive carcinoma of no special type (Ductal not otherwise specified)  ndGndRndAndDndEnd:nd nd2nd TUBULAR FORMATION:  3  MITOTIC ACTIVITY:  2  PLEOMORPHISM:  3  OVERALL SCORE: 8/9   DUCTAL CARCINOMA IN SITU (DCIS) EXTENT: Single 3 mm focus present laterally adjacent to tumor.   SURGICAL MARGINS (Uninvolved/positive): Uninvolved by invasive carcinoma   INVASIVE CARCINOMA MARGINS (Uninvolved/Positive) Uninvolved   DISTANCE FROM CLOSEST MARGIN: 0.2 cm  SPECIFIC CLOSEST MARGIN: Anterior margin   DCIS MARGINS (Uninvolved/Positive/No DCIS): Uninvolved by DCIS   DISTANCE FROM CLOSEST MARGIN: 0.4 cm   SPECIFIC CLOSEST MARGIN: Posterior   LYMPH NODES (required only if lymph nodes are present in the specimen):  One benign sentinel lymph node (0/1)  NUMBER OF LYMPH NODES WITH MACROMETASTASIS: 0  NUMBER OF LYMPH NODES WITH MICROMETASTASIS: 0  NUMBER OF LYMPH NODES WITH ISOLATED TUMOR CELLS: 0  TOTAL NUMBER OF LYMPH NODES EXAMINED (Including sentinel nodes): 1  NUMBER OF SENTINEL NODES EXAMINED:  1  VASCULAR/LYMPHATIC INVASION:  Not identified     ESTROGEN RECEPTOR STATUS BY IHC METHOD: Negative  PROGESTERONE RECEPTOR STATUS BY IHC METHOD: Negative   HER-2/jakob ONCOPROTEIN STATUS BY IHC METHOD:  Negative  TREATMENT EFFECT (BREAST): No known presurgical therapy  ADDITIONAL PATHOLOGIC FINDINGS:  None identified   OTHER STUDIES:  Not performed, available upon request   AJCC PATHOLOGIC STAGE:  (COMPLETED BY PATHOLOGIST, BASED ONLY ON TISSUE FINDINGS, MORE EXTENSIVE DISEASE MAY NOT BE KNOWN TO THE PATHOLOGIST)  pT=  1b  pN=  0  STAGE:  IA  JTA/klb            ASSESSMENT:    1. Stage IB triple negative ductal carcinoma of the left breast status post lumpectomy.  I have recommended 4 cycles of Taxotere and Cytoxan as adjuvant therapy in this disease.  Since she is a little bit older and this chemotherapy is moderate risk for neutropenia I'm going to suuport her with Neulasta.  I like to get a baseline CAT scan of the chest abdomen pelvis along with a bone scan since she is a high risk for distant disease.  We discussed the side effects of the treatment itself.  We also discussed the rationale behind adjuvant therapy.  She will need radiotherapy after she's completed this.  I will plan to treat her in [place].  I anticipate that she will tolerate this reasonably well.  I will schedule her to see one of our nurse practitioners to educate her regarding this symptom management off chemotherapy.  Tentatively I plan to start her on March 7th.  I was able to answer all the questions that she had for me today and will see her in my clinic in follow-up with chemotherapy.  If she has any other issues that arise in the interim she can always give me a call.    I spent a total of 60  minutes in direct patient care, greater than 35 minutes (greater than 50%) were spent in coordination of care, and counseling the patient regarding  triple negative breast cancer . Answered any questions patient had with medication and  plan.        Thank you for allowing me to participate in the care of this patient.    Yours sincerely,    Jacy Uribe MD  [facility]  Hematology and Oncology         Orders Placed This Encounter   Procedures   • NM Bone Scan Whole Body     Standing Status:   Future     Standing Expiration Date:   2/15/2020   • CT Abdomen Pelvis Without Contrast     Standing Status:   Future     Standing Expiration Date:   2/15/2020     Order Specific Question:   Will Oral Contrast be needed for this procedure?     Answer:   Yes   • CT Chest Without Contrast     Standing Status:   Future   • Comprehensive metabolic panel     Standing Status:   Future     Standing Expiration Date:   3/6/2020   • CBC and Differential     Standing Status:   Future     Standing Expiration Date:   3/6/2020     Order Specific Question:   Manual Differential     Answer:   No         Please note that portions of this note may have been completed with a voice recognition program. Efforts were made to edit the dictations, but occasionally words are mistranscribed.

## 2019-02-17 NOTE — PROGRESS NOTES
CONSULTATION NOTE    NAME:      Ava Mota  :                                                          1955  DATE OF CONSULTATION:                      2019  REQUESTING PHYSICIAN:                   Rehan Angel MD  REASON FOR CONSULTATION:           Cancer Staging  Malignant neoplasm of upper-outer quadrant of left breast in female, estrogen receptor negative (CMS/HCC)  Staging form: Breast, AJCC 8th Edition  - Clinical stage from 2019: Stage IB (cT1b, cN0, cM0, G3, ER: Negative, CA: Negative, HER2: Negative) - Signed by Jacy Uribe MD on 2/15/2019           BRIEF HISTORY:  Ava Mota  is a very pleasant 63 y.o. female  who had an abnormal mammogram and a biopsy-proven poorly differentiated infiltrating ductal carcinoma in the left 3 o'clock position measuring 6 mm.  She underwent lumpectomy by Dr. PATINO on 2019.  She was found to have a grade 3, high grade 6 mm tumor with margins negative by 2 mm.  The tumor was ER/CA-negative HER-2/jakob negative and 0 of 1 lymph node was positive.  She sees Dr. Braun this afternoon regarding chemotherapy and is here to discuss postoperative radiation.  She has no complaints today and has recovered well from surgery.  Allergies   Allergen Reactions   • Iodine Anaphylaxis     PT STATES SHE 'PASSED OUT ' AFTER CT CONTRAST.        Social History     Tobacco Use   • Smoking status: Former Smoker     Packs/day: 0.50     Years: 2.00     Pack years: 1.00     Types: Cigarettes     Last attempt to quit: 1972     Years since quittin.1   • Smokeless tobacco: Never Used   Substance Use Topics   • Alcohol use: No     Frequency: Never   • Drug use: No         Past Medical History:   Diagnosis Date   • Diabetes mellitus (CMS/HCC)    • Hypertension        family history includes Breast cancer in her sister; Colon cancer in her sister; Diabetes in her mother; Emphysema in her father; Heart disease in her mother; Lung cancer in her brother; Stroke in her  "mother.     Past Surgical History:   Procedure Laterality Date   • CARPAL TUNNEL RELEASE Left 2015   •  SECTION      x2   • CHOLECYSTECTOMY  1973   • COLONOSCOPY  2013   • KNEE ARTHROPLASTY Bilateral      &    • TUBAL ABDOMINAL LIGATION  1985        Review of Systems - Oncology        Objective   VITAL SIGNS: 202 pounds, 63\" height, 180/88 pulse 75    KPS     90%    Physical Exam   Constitutional: She is oriented to person, place, and time. She appears well-developed and well-nourished. No distress.   HENT:   Head: Normocephalic and atraumatic.   Eyes: EOM are normal. No scleral icterus.   Neck: Neck supple.   Cardiovascular: Normal rate and regular rhythm.   Pulmonary/Chest: Effort normal and breath sounds normal. Right breast exhibits no inverted nipple, no mass, no nipple discharge, no skin change and no tenderness. Left breast exhibits skin change. Left breast exhibits no inverted nipple, no mass and no nipple discharge.       Lymphadenopathy:     She has no cervical adenopathy.   Neurological: She is alert and oriented to person, place, and time.   Nursing note and vitals reviewed.           The following portions of the patient's history were reviewed and updated as appropriate: allergies, current medications, past family history, past medical history, past social history, past surgical history and problem list.    Assessment      IMPRESSION:  Mrs. gonzalez has a small, triple negative, high-grade invasive cancer of the left breast she will see Dr. Braun today regarding chemotherapy.      RECOMMENDATIONS: I recommend postoperative radiotherapy to complete her breast conserving treatment.  The pros and cons, risks and benefits of treatment were discussed.  I anticipate 45 Gray in 25 fractions to the left breast and a tumor bed boost of 10 Gray in 5 fractions.  We discussed deep inspiration breath hold and informed consent was obtained.  She will see Dr. Braun today regarding chemotherapy.  " If she undergoes chemotherapy we will see her following for radiation.  Thank you very much for asking me to see Mrs. Mota.      ADDENDUM:  Dr. Braun recommended chemotherapy.  She was instructed to call my nurse Deja upon completion.     Beverly Rogers MD      Errors in dictation may reflect use of voice recognition software and not all errors in transcription may have been detected prior to signing.

## 2019-03-05 ENCOUNTER — OFFICE VISIT (OUTPATIENT)
Dept: ONCOLOGY | Facility: CLINIC | Age: 64
End: 2019-03-05

## 2019-03-05 ENCOUNTER — HOSPITAL ENCOUNTER (OUTPATIENT)
Dept: NUCLEAR MEDICINE | Facility: HOSPITAL | Age: 64
Discharge: HOME | End: 2019-03-05

## 2019-03-05 ENCOUNTER — HOSPITAL ENCOUNTER (OUTPATIENT)
Dept: CT IMAGING | Facility: HOSPITAL | Age: 64
Discharge: HOME | End: 2019-03-05
Admitting: INTERNAL MEDICINE

## 2019-03-05 ENCOUNTER — HOSPITAL ENCOUNTER (OUTPATIENT)
Dept: CT IMAGING | Facility: HOSPITAL | Age: 64
Discharge: HOME | End: 2019-03-05

## 2019-03-05 VITALS
SYSTOLIC BLOOD PRESSURE: 176 MMHG | RESPIRATION RATE: 14 BRPM | WEIGHT: 206 LBS | DIASTOLIC BLOOD PRESSURE: 82 MMHG | HEIGHT: 62 IN | BODY MASS INDEX: 37.91 KG/M2 | HEART RATE: 86 BPM | TEMPERATURE: 97 F

## 2019-03-05 DIAGNOSIS — L65.9 ALOPECIA: ICD-10-CM

## 2019-03-05 DIAGNOSIS — Z17.1 MALIGNANT NEOPLASM OF UPPER-OUTER QUADRANT OF LEFT BREAST IN FEMALE, ESTROGEN RECEPTOR NEGATIVE (HCC): ICD-10-CM

## 2019-03-05 DIAGNOSIS — C50.412 MALIGNANT NEOPLASM OF UPPER-OUTER QUADRANT OF LEFT BREAST IN FEMALE, ESTROGEN RECEPTOR NEGATIVE (HCC): ICD-10-CM

## 2019-03-05 DIAGNOSIS — C50.412 MALIGNANT NEOPLASM OF UPPER-OUTER QUADRANT OF LEFT BREAST IN FEMALE, ESTROGEN RECEPTOR NEGATIVE (HCC): Primary | ICD-10-CM

## 2019-03-05 DIAGNOSIS — Z17.1 MALIGNANT NEOPLASM OF UPPER-OUTER QUADRANT OF LEFT BREAST IN FEMALE, ESTROGEN RECEPTOR NEGATIVE (HCC): Primary | ICD-10-CM

## 2019-03-05 PROCEDURE — 74176 CT ABD & PELVIS W/O CONTRAST: CPT

## 2019-03-05 PROCEDURE — 0 TECHNETIUM MEDRONATE KIT: Performed by: INTERNAL MEDICINE

## 2019-03-05 PROCEDURE — 71250 CT THORAX DX C-: CPT

## 2019-03-05 PROCEDURE — A9503 TC99M MEDRONATE: HCPCS | Performed by: INTERNAL MEDICINE

## 2019-03-05 PROCEDURE — A9270 NON-COVERED ITEM OR SERVICE: HCPCS | Performed by: INTERNAL MEDICINE

## 2019-03-05 PROCEDURE — 78306 BONE IMAGING WHOLE BODY: CPT

## 2019-03-05 PROCEDURE — 63710000001 BARIUM 2 % SUSPENSION: Performed by: INTERNAL MEDICINE

## 2019-03-05 PROCEDURE — 99215 OFFICE O/P EST HI 40 MIN: CPT | Performed by: NURSE PRACTITIONER

## 2019-03-05 RX ORDER — TC 99M MEDRONATE 20 MG/10ML
26.7 INJECTION, POWDER, LYOPHILIZED, FOR SOLUTION INTRAVENOUS
Status: COMPLETED | OUTPATIENT
Start: 2019-03-05 | End: 2019-03-05

## 2019-03-05 RX ADMIN — BARIUM SULFATE 450 ML: 21 SUSPENSION ORAL at 14:14

## 2019-03-05 RX ADMIN — TC 99M MEDRONATE 26.7 MILLICURIE: 20 INJECTION, POWDER, LYOPHILIZED, FOR SOLUTION INTRAVENOUS at 13:00

## 2019-03-05 NOTE — PROGRESS NOTES
CHEMOTHERAPY PREPARATION    Ava Mota  [id number]  1955    Chief Complaint: Management of Triple negative breast cancer    History of present illness:  Ava Mota is a 63 y.o. year old female who is here today for chemotherapy preparation and needs assessment. The patient has been diagnosed with Stage IB triple negative breast cancer of the left breast status post lumpectomy and is scheduled to begin treatment with adjuvant therapy Taxotere and Cytoxan  with Neulasta. However, she recently had a tooth broke off and is scheduled with her dentist for recommendations on 2018  .        .     Oncology History:       Malignant neoplasm of upper-outer quadrant of left breast in female, estrogen receptor negative (CMS/HCC)    1/15/2019 Initial Diagnosis     Malignant neoplasm of upper-outer quadrant of left breast in female, estrogen receptor negative (CMS/HCC)  Abnormal mammogram and a biopsy-proven poorly differentiated infiltrating ductal carcinoma in the left 3 o'clock position measuring 6 mm.                2019 Surgery     Surgery  Lumpectomy by Dr. PATINO on 2019.   Grade 3, high grade 6 mm tumor with margins negative by 2 mm.    Tumor was ER/AL-negative HER-2/jakob negative and 0 of 1 lymph node was positive.                 3/5/2019 Imaging     CT and bone scan scheduled 2019         3/5/2019 -  Chemotherapy     OP BREAST TC DOCEtaxel / Cyclophosphamide  Start date 2019 pending.   Education 2019            Past Medical History:   Diagnosis Date   • Diabetes mellitus (CMS/HCC)    • Hypertension        Past Surgical History:   Procedure Laterality Date   • CARPAL TUNNEL RELEASE Left 2015   •  SECTION      x2   • CHOLECYSTECTOMY  1973   • COLONOSCOPY  2013   • KNEE ARTHROPLASTY Bilateral      &    • TUBAL ABDOMINAL LIGATION  1985       MEDICATIONS: The current medication list was reviewed and reconciled.     Allergies:  is allergic to iodine.    Family  "History   Problem Relation Age of Onset   • Diabetes Mother    • Heart disease Mother    • Stroke Mother    • Emphysema Father    • Breast cancer Sister    • Lung cancer Brother    • Colon cancer Sister          Review of Systems    Physical Exam  Vital Signs: /82   Pulse 86   Temp 97 °F (36.1 °C) (Temporal)   Resp 14   Ht 157.5 cm (62\")   Wt 93.4 kg (206 lb)   BMI 37.68 kg/m²    General Appearance:  alert, cooperative, no apparent distress and appears stated age   Neurologic/Psychiatric: A&O x 3, gait steady, appropriate affect   HEENT:  Normocephalic, without obvious abnormality, mucous membranes moist   Lungs:   Clear to auscultation bilaterally; respirations regular, even, and unlabored bilaterally   Heart:  Regular rate and rhythm, no murmurs appreciated   Extremities: Normal, atraumatic; no clubbing, cyanosis, or edema    Skin: No rashes, lesions, or abnormal coloration noted     ECOG Performance Status: (1) Restricted in physically strenuous activity, ambulatory and able to do work of light nature          NEEDS ASSESSMENTS    Genetics  The patient's new diagnosis and family history have been reviewed for genetic counseling needs. Genetic counseling completed.  Awaiting results.     Psychosocial  The patient has completed a PHQ-9 Depression Screening and the Distress Thermometer (DT) today.   PHQ-9 results show 1-4 (Minimal Depression). The patient scored their distress today as 2 on a scale of 0-10 with 0 being no distress and 10 being extreme distress.   Problems marked by the patient as being an issue for them within the last week include practical problems and physical problems.   Results were reviewed along with psychosocial resources offered by our cancer center. Our oncology social worker will be flagged for a DT score of 4 or above, and a same day call will be made for a score of 9 or 10. A mental health referral is not recommended at this time. The patient is not accepting of a referral " "to [facility].   Copies of patient's questionnaires will be scanned into EMR for details and further reference.    Barriers to care  A barriers form was also completed by the patient today. We discussed services offered by our facility to help her have adequate access to care. The patient was given the name and card for our Oncology Social Worker, Francine Hernández. Based upon barriers assessment today, the patient will require a follow-up call from the  to further discuss needs.   A copy of the barriers form will also be scanned into EMR for details and further reference.     VAD Assessment  The patient and I discussed planned intervenous chemotherapy as well as other IV treatments that are often needed throughout the course of treatment. These may include, but are not limited to blood transfusions, antibiotics, and IV hydration. The vasculature does appear to be adequate for multiple peripheral IVs throughout their treatment course. Discussed risks and benefits of VADs. The patient would not like to pursue Port-A-Cath insertion prior to initiation of treatment.     Advanced Care Planning  The patient and I discussed advanced care planning, \"Conversations that Matter\".   This service was offered, free of charge, for development of advance directives with a certified ACP facilitator.  The patient does not have an up-to-date advanced directive. This document is not on file with our office. The patient is interested in an appointment with one of our facilitators to create or update their advanced directives.      Palliative Care  The patient and I discussed palliative care services. Palliative care is not the same as Hospice care. This is specialized medical care for people living with serious illness with the goal of improving quality of life for the patient and their family. [facility] has partnered with [facility] to offer our patients outpatient palliative care early along with their " treatment to assist in coordination of care, symptom management, pain management, and medical decision making.  Oncology criteria for palliative care referral is not met at this time. The patient is not interested in a palliative care consultation.     Additional Referral needs  Nutrition      CHEMOTHERAPY EDUCATION    Booklets Given: Chemotherapy and You [x]  Eating Hints [x]    Sexuality/Fertility Books []      Chemotherapy/Biotherapy Education Sheets: (list all that apply)  nausea management, acid reflux management, diarrhea management, Cancer resourse contacts information and skin and mouth care                                                                                                                                                                 Chemotherapy Regimen:   Treatment Plans     Name Type Plan dates Plan Provider         Active    OP BREAST TC DOCEtaxel / Cyclophosphamide ONCOLOGY TREATMENT  3/6/2019 - Present Jacy Uribe MD                    TOPICS EDUCATION PROVIDED COMMENTS   ANEMIA:  role of RBC, cause, s/s, ways to manage, role of transfusion [x]    THROMBOCYTOPENIA:  role of platelet, cause, s/s, ways to prevent bleeding, things to avoid, when to seek help [x]    NEUTROPENIA:  role of WBC, cause, infection precautions, s/s of infection, when to call MD [x]    NUTRITION & APPETITE CHANGES:  importance of maintaining healthy diet & weight, ways to manage to improve intake, dietary consult, exercise regimen [x]    DIARRHEA:  causes, s/s of dehydration, ways to manage, dietary changes, when to call MD [x]    CONSTIPATION:  causes, ways to manage, dietary changes, when to call MD [x]    NAUSEA & VOMITING:  cause, use of antiemetics, dietary changes, when to call MD [x]    MOUTH SORES:  causes, oral care, ways to manage [x]    ALOPECIA:  cause, ways to manage, resources [x]    INFERTILITY & SEXUALITY:  causes, fertility preservation options, sexuality changes, ways to manage,  importance of birth control [x]    NERVOUS SYSTEM CHANGES:  causes, s/s, neuropathies, cognitive changes, ways to manage [x]    PAIN:  causes, ways to manage [x] ????   SKIN & NAIL CHANGES:  cause, s/s, ways to manage [x]    ORGAN TOXICITIES:  cause, s/s, need for diagnostic tests, labs, when to notify MD [x]    SURVIVORSHIP:  distress, distress assessment, secondary malignancies, early/late effects, follow-up, social issues, social support [x]    HOME CARE:  use of spill kits, storing of PO chemo, how to manage bodily fluids [x]    MISCELLANEOUS:  drug interactions, administration, vesicant, et [x]        Assessment and Plan:    Diagnoses and all orders for this visit:    Malignant neoplasm of upper-outer quadrant of left breast in female, estrogen receptor negative (CMS/HCC)  -     Cancel: Miscellaneous DME  -     Miscellaneous DME  -     Ambulatory Referral to Social Work  -     Ambulatory Referral to Nutrition Services    Alopecia  -     Miscellaneous DME        This was a 60 minute face-to-face visit with 55 minutes spent in  counseling and coordination of care as documented above.   The patient and I have reviewed their new cancer diagnosis and scheduled treatment plan. Needs assessment was completed including genetics, psychosocial needs, barriers to care, VAD evaluation, advanced care planning, and palliative care services. Referrals have been ordered as appropriate based upon our evaluation and patient desires.     Chemotherapy teaching was also completed today as documented above. Adequate time was given to answer all questions to her satisfaction. Patient and family are aware of their care team members and contact information if they have questions or problems throughout the treatment course. Needs assessments and education has been completed. The patient is adequately prepared to begin treatment as scheduled.       Mlia Pelayo, APRN

## 2019-03-06 ENCOUNTER — TELEPHONE (OUTPATIENT)
Dept: ONCOLOGY | Facility: CLINIC | Age: 64
End: 2019-03-06

## 2019-03-06 NOTE — TELEPHONE ENCOUNTER
Attempted to reach pt at number provided. No answer and VM box full.       Called and left VM on home phone. Let her know Dr Braun is ok with moving forward with treatment as scheduled tomorrow.

## 2019-03-06 NOTE — TELEPHONE ENCOUNTER
Called patient and left message that her Ct Scan came back negative and looking good and to return call if she had any questions.

## 2019-03-06 NOTE — TELEPHONE ENCOUNTER
----- Message from Nimco Brito RN sent at 3/6/2019  3:49 PM EST -----  Regarding: FW: DR JARRETT// CHEMO QUESTION   Contact: [phone]      ----- Message -----  From: Yakov Canales  Sent: 3/6/2019   3:14 PM  To: [facility]  Subject: DR JARRETT// CHEMO QUESTION                          PT CAME IN STATING THAT SHE HAD A TOOTH PULLED TODAY. SHE STARTS CHEMO TOMORROW. SHE WANTS TO MAKE SHE THAT SHE IS STILL ABLE TO START CHEMO. SHE WOULD LIKE CALL BACK.

## 2019-03-07 ENCOUNTER — INFUSION (OUTPATIENT)
Dept: ONCOLOGY | Facility: HOSPITAL | Age: 64
End: 2019-03-07

## 2019-03-07 VITALS
DIASTOLIC BLOOD PRESSURE: 87 MMHG | HEART RATE: 94 BPM | RESPIRATION RATE: 16 BRPM | TEMPERATURE: 98.2 F | WEIGHT: 202 LBS | BODY MASS INDEX: 36.95 KG/M2 | SYSTOLIC BLOOD PRESSURE: 184 MMHG

## 2019-03-07 DIAGNOSIS — Z17.1 MALIGNANT NEOPLASM OF UPPER-OUTER QUADRANT OF LEFT BREAST IN FEMALE, ESTROGEN RECEPTOR NEGATIVE (HCC): Primary | ICD-10-CM

## 2019-03-07 DIAGNOSIS — C50.412 MALIGNANT NEOPLASM OF UPPER-OUTER QUADRANT OF LEFT BREAST IN FEMALE, ESTROGEN RECEPTOR NEGATIVE (HCC): Primary | ICD-10-CM

## 2019-03-07 LAB
ALBUMIN SERPL-MCNC: 4.3 G/DL (ref 3.5–5)
ALBUMIN/GLOB SERPL: 1.3 G/DL (ref 1–2)
ALP SERPL-CCNC: 130 U/L (ref 38–126)
ALT SERPL W P-5'-P-CCNC: 30 U/L (ref 13–69)
ANION GAP SERPL CALCULATED.3IONS-SCNC: 10.9 MMOL/L (ref 10–20)
AST SERPL-CCNC: 28 U/L (ref 15–46)
BASOPHILS # BLD AUTO: 0.03 10*3/MM3 (ref 0–0.2)
BASOPHILS NFR BLD AUTO: 0.4 % (ref 0–2.5)
BILIRUB SERPL-MCNC: 1.3 MG/DL (ref 0.2–1.3)
BUN BLD-MCNC: 13 MG/DL (ref 7–20)
BUN/CREAT SERPL: 21.7 (ref 7.1–23.5)
CALCIUM SPEC-SCNC: 9.8 MG/DL (ref 8.4–10.2)
CHLORIDE SERPL-SCNC: 106 MMOL/L (ref 98–107)
CO2 SERPL-SCNC: 25 MMOL/L (ref 26–30)
CREAT BLD-MCNC: 0.6 MG/DL (ref 0.6–1.3)
DEPRECATED RDW RBC AUTO: 39.9 FL (ref 37–54)
EOSINOPHIL # BLD AUTO: 0 10*3/MM3 (ref 0–0.7)
EOSINOPHIL NFR BLD AUTO: 0 % (ref 0–7)
ERYTHROCYTE [DISTWIDTH] IN BLOOD BY AUTOMATED COUNT: 12.8 % (ref 11.5–14.5)
GFR SERPL CREATININE-BSD FRML MDRD: 100 ML/MIN/1.73
GLOBULIN UR ELPH-MCNC: 3.4 GM/DL
GLUCOSE BLD-MCNC: 313 MG/DL (ref 74–98)
HCT VFR BLD AUTO: 42.3 % (ref 37–47)
HGB BLD-MCNC: 14.2 G/DL (ref 12–16)
IMM GRANULOCYTES # BLD AUTO: 0.06 10*3/MM3 (ref 0–0.06)
IMM GRANULOCYTES NFR BLD AUTO: 0.8 % (ref 0–0.6)
LYMPHOCYTES # BLD AUTO: 0.76 10*3/MM3 (ref 0.6–3.4)
LYMPHOCYTES NFR BLD AUTO: 10 % (ref 10–50)
MCH RBC QN AUTO: 28.8 PG (ref 27–31)
MCHC RBC AUTO-ENTMCNC: 33.6 G/DL (ref 30–37)
MCV RBC AUTO: 85.8 FL (ref 81–99)
MONOCYTES # BLD AUTO: 0.07 10*3/MM3 (ref 0–0.9)
MONOCYTES NFR BLD AUTO: 0.9 % (ref 0–12)
NEUTROPHILS # BLD AUTO: 6.7 10*3/MM3 (ref 2–6.9)
NEUTROPHILS NFR BLD AUTO: 87.9 % (ref 37–80)
NRBC BLD AUTO-RTO: 0 /100 WBC (ref 0–0)
PLATELET # BLD AUTO: 320 10*3/MM3 (ref 130–400)
PMV BLD AUTO: 9.5 FL (ref 6–12)
POTASSIUM BLD-SCNC: 3.9 MMOL/L (ref 3.5–5.1)
PROT SERPL-MCNC: 7.7 G/DL (ref 6.3–8.2)
RBC # BLD AUTO: 4.93 10*6/MM3 (ref 4.2–5.4)
SODIUM BLD-SCNC: 138 MMOL/L (ref 137–145)
WBC NRBC COR # BLD: 7.62 10*3/MM3 (ref 4.8–10.8)

## 2019-03-07 PROCEDURE — 85025 COMPLETE CBC W/AUTO DIFF WBC: CPT

## 2019-03-07 PROCEDURE — 25010000002 PEGFILGRASTIM 6 MG/0.6ML PREFILLED SYRINGE KIT: Performed by: INTERNAL MEDICINE

## 2019-03-07 PROCEDURE — 25010000002 PALONOSETRON PER 25 MCG: Performed by: INTERNAL MEDICINE

## 2019-03-07 PROCEDURE — 96377 APPLICATON ON-BODY INJECTOR: CPT

## 2019-03-07 PROCEDURE — 96417 CHEMO IV INFUS EACH ADDL SEQ: CPT

## 2019-03-07 PROCEDURE — 25010000002 CYCLOPHOSPHAMIDE PER 100 MG: Performed by: INTERNAL MEDICINE

## 2019-03-07 PROCEDURE — 25010000002 DOCETAXEL 20 MG/ML SOLUTION 4 ML VIAL: Performed by: INTERNAL MEDICINE

## 2019-03-07 PROCEDURE — 36415 COLL VENOUS BLD VENIPUNCTURE: CPT

## 2019-03-07 PROCEDURE — 96413 CHEMO IV INFUSION 1 HR: CPT

## 2019-03-07 PROCEDURE — 96375 TX/PRO/DX INJ NEW DRUG ADDON: CPT

## 2019-03-07 PROCEDURE — 80053 COMPREHEN METABOLIC PANEL: CPT

## 2019-03-07 RX ORDER — SODIUM CHLORIDE 9 MG/ML
250 INJECTION, SOLUTION INTRAVENOUS ONCE
Status: DISCONTINUED | OUTPATIENT
Start: 2019-03-07 | End: 2019-03-07 | Stop reason: HOSPADM

## 2019-03-07 RX ORDER — PALONOSETRON 0.05 MG/ML
0.25 INJECTION, SOLUTION INTRAVENOUS ONCE
Status: COMPLETED | OUTPATIENT
Start: 2019-03-07 | End: 2019-03-07

## 2019-03-07 RX ADMIN — DOCETAXEL 145 MG: 20 INJECTION, SOLUTION, CONCENTRATE INTRAVENOUS at 10:42

## 2019-03-07 RX ADMIN — PEGFILGRASTIM 6 MG: KIT SUBCUTANEOUS at 12:17

## 2019-03-07 RX ADMIN — PALONOSETRON HYDROCHLORIDE 0.25 MG: 0.25 INJECTION INTRAVENOUS at 10:11

## 2019-03-07 RX ADMIN — CYCLOPHOSPHAMIDE 1160 MG: 500 INJECTION, POWDER, FOR SOLUTION INTRAVENOUS; ORAL at 11:42

## 2019-03-07 NOTE — PROGRESS NOTES
"Oncology Nutrition Screening    Patient Name:  Ava Mota  YOB: 1955  MRN: [id number]  Date:  03/07/19  Physician:  Brian Parry MD      Type of Cancer Treatment:   Chemotherapy: Docetaxel/Cytoxan    Patient Active Problem List   Diagnosis   • Malignant neoplasm of upper-outer quadrant of left breast in female, estrogen receptor negative (CMS/HCC)       Current Outpatient Medications   Medication Sig Dispense Refill   • dexamethasone (DECADRON) 4 MG tablet Take 2 tablets oral twice a day for 3 consecutive days beginning the day before chemotherapy and continue for 6 doses. 50 tablet 3   • fluticasone (FLONASE) 50 MCG/ACT nasal spray   0   • HYDROcodone-acetaminophen (NORCO) 5-325 MG per tablet Take 1-2 tablets by mouth Every 4 to 6 Hours As Needed for Pain 14 tablet 0   • insulin NPH (humuLIN N,novoLIN N) 100 UNIT/ML injection Inject  under the skin into the appropriate area as directed 2 (Two) Times a Day Before Meals.     • irbesartan (AVAPRO) 300 MG tablet Take 300 mg by mouth Daily.  0   • ondansetron (ZOFRAN) 8 MG tablet Take 1 tablet by mouth 3 (Three) Times a Day As Needed for Nausea or Vomiting. 30 tablet 5   • RA ALLERGY RELIEF 10 MG tablet Take 10 mg by mouth Daily.  0   • valsartan (DIOVAN) 160 MG tablet Take 160 mg by mouth Daily.  0     Current Facility-Administered Medications   Medication Dose Route Frequency Provider Last Rate Last Dose   • sodium chloride 0.9 % infusion 250 mL  250 mL Intravenous Once Jacy Uribe MD           Glycemic Risk:   DM     Weight:   Height: 62\"  Weight: 206 lbs.  BMI: 37.7  Weight change: 1 lb. Gained over 1 month    Oral Food Intake:  Special Diet Restrictions: due to DM    Hydration Status:   How many 8 ounce glass of water of fluid do you drink per day?  6    Enteral Feeding:   N/A    Nutrition Symptoms:   None currently    Activity:   Normal with no limitations     reports that she quit smoking about 47 years ago. Her smoking use included " cigarettes. She has a 1.00 pack-year smoking history. she has never used smokeless tobacco. She reports that she does not drink alcohol or use drugs.    Evaluation of Nutritional Risk:   Patient identified to be at nutritional risk and/or for nutritional consultation; will follow patient through course of treatment.   Diagnosis  Pt. Received handouts from the [employer] [place] and Academy of Nutrition and Dietetics regarding: Quick and Easy Snacks, Foods and Drinks that are easy on the stomach, Nausea/Vomiting diet recommendations, Foods that are high in protein and high in calories, Weight loss nutrition recommendations with recipes, Oncology: Nutrition Tips for Well-Being and Oncology: Cooking Tips. RD encouraged drinking adequate amounts of water daily 8 glasses or 64 ounces and to focus on hydration hourly. RD also discussed the importance of 5-6 small meals daily and trying to snack throughout the day and the use of nutritional supplements between meals. Pt. Has program contact information and has been encouraged to call with questions.            Electronically signed by:  Pily Garcia RD  2:16 PM

## 2019-03-08 ENCOUNTER — TELEPHONE (OUTPATIENT)
Dept: ONCOLOGY | Facility: CLINIC | Age: 64
End: 2019-03-08

## 2019-03-08 NOTE — TELEPHONE ENCOUNTER
Patient was transferred to triage line from the [place] office with questions about neulasta onbody.  Educated patient and she verbalized understanding.

## 2019-03-13 ENCOUNTER — APPOINTMENT (OUTPATIENT)
Dept: RADIATION ONCOLOGY | Facility: HOSPITAL | Age: 64
End: 2019-03-13

## 2019-03-21 ENCOUNTER — TELEPHONE (OUTPATIENT)
Dept: ONCOLOGY | Facility: CLINIC | Age: 64
End: 2019-03-21

## 2019-03-21 NOTE — TELEPHONE ENCOUNTER
Patient called triage line and reported that she is having burning sensation with urination and also having frequency.  Patient also reported that she has a couple of sores in her nose that will occasionally bleed when she blows her nose.  Patient reported that the sores started 4-5 days ago and she has been putting BRUNO on them and they are getting better.  Discussed with patient about going to PCP in her town to have a UA, due to patient living a distance from any of our labs with [facility].  Patient verbalized understanding and will go to PCP for UA.  Advised patient to return call if sores in nose don't improve.

## 2019-03-22 ENCOUNTER — TELEPHONE (OUTPATIENT)
Dept: ONCOLOGY | Facility: CLINIC | Age: 64
End: 2019-03-22

## 2019-03-22 RX ORDER — SULFAMETHOXAZOLE AND TRIMETHOPRIM 800; 160 MG/1; MG/1
TABLET ORAL
Qty: 10 TABLET | Refills: 0 | Status: SHIPPED | OUTPATIENT
Start: 2019-03-22 | End: 2019-05-30

## 2019-03-28 ENCOUNTER — INFUSION (OUTPATIENT)
Dept: ONCOLOGY | Facility: HOSPITAL | Age: 64
End: 2019-03-28

## 2019-03-28 ENCOUNTER — OFFICE VISIT (OUTPATIENT)
Dept: ONCOLOGY | Facility: CLINIC | Age: 64
End: 2019-03-28

## 2019-03-28 VITALS
HEIGHT: 62 IN | SYSTOLIC BLOOD PRESSURE: 146 MMHG | HEART RATE: 79 BPM | WEIGHT: 204 LBS | BODY MASS INDEX: 37.54 KG/M2 | DIASTOLIC BLOOD PRESSURE: 82 MMHG | RESPIRATION RATE: 18 BRPM | TEMPERATURE: 97.9 F

## 2019-03-28 DIAGNOSIS — Z17.1 MALIGNANT NEOPLASM OF UPPER-OUTER QUADRANT OF LEFT BREAST IN FEMALE, ESTROGEN RECEPTOR NEGATIVE (HCC): Primary | ICD-10-CM

## 2019-03-28 DIAGNOSIS — Z17.1 MALIGNANT NEOPLASM OF UPPER-OUTER QUADRANT OF LEFT BREAST IN FEMALE, ESTROGEN RECEPTOR NEGATIVE (HCC): ICD-10-CM

## 2019-03-28 DIAGNOSIS — C50.412 MALIGNANT NEOPLASM OF UPPER-OUTER QUADRANT OF LEFT BREAST IN FEMALE, ESTROGEN RECEPTOR NEGATIVE (HCC): Primary | ICD-10-CM

## 2019-03-28 DIAGNOSIS — C50.412 MALIGNANT NEOPLASM OF UPPER-OUTER QUADRANT OF LEFT BREAST IN FEMALE, ESTROGEN RECEPTOR NEGATIVE (HCC): ICD-10-CM

## 2019-03-28 LAB
ALBUMIN SERPL-MCNC: 4.1 G/DL (ref 3.5–5)
ALBUMIN/GLOB SERPL: 1.4 G/DL (ref 1–2)
ALP SERPL-CCNC: 98 U/L (ref 38–126)
ALT SERPL W P-5'-P-CCNC: 30 U/L (ref 13–69)
ANION GAP SERPL CALCULATED.3IONS-SCNC: 14.5 MMOL/L (ref 10–20)
AST SERPL-CCNC: 21 U/L (ref 15–46)
BASOPHILS # BLD AUTO: 0.02 10*3/MM3 (ref 0–0.2)
BASOPHILS NFR BLD AUTO: 0.2 % (ref 0–1.5)
BILIRUB SERPL-MCNC: 0.9 MG/DL (ref 0.2–1.3)
BUN BLD-MCNC: 19 MG/DL (ref 7–20)
BUN/CREAT SERPL: 23.8 (ref 7.1–23.5)
CALCIUM SPEC-SCNC: 9.5 MG/DL (ref 8.4–10.2)
CHLORIDE SERPL-SCNC: 107 MMOL/L (ref 98–107)
CO2 SERPL-SCNC: 19 MMOL/L (ref 26–30)
CREAT BLD-MCNC: 0.8 MG/DL (ref 0.6–1.3)
DEPRECATED RDW RBC AUTO: 44.7 FL (ref 37–54)
EOSINOPHIL # BLD AUTO: 0 10*3/MM3 (ref 0–0.4)
EOSINOPHIL NFR BLD AUTO: 0 % (ref 0.3–6.2)
ERYTHROCYTE [DISTWIDTH] IN BLOOD BY AUTOMATED COUNT: 14.4 % (ref 12.3–15.4)
GFR SERPL CREATININE-BSD FRML MDRD: 72 ML/MIN/1.73
GLOBULIN UR ELPH-MCNC: 3 GM/DL
GLUCOSE BLD-MCNC: 280 MG/DL (ref 74–98)
HCT VFR BLD AUTO: 37.8 % (ref 34–46.6)
HGB BLD-MCNC: 12.4 G/DL (ref 12–15.9)
IMM GRANULOCYTES # BLD AUTO: 0.09 10*3/MM3 (ref 0–0.05)
IMM GRANULOCYTES NFR BLD AUTO: 1 % (ref 0–0.5)
LYMPHOCYTES # BLD AUTO: 0.72 10*3/MM3 (ref 0.7–3.1)
LYMPHOCYTES NFR BLD AUTO: 8.1 % (ref 19.6–45.3)
MCH RBC QN AUTO: 28.8 PG (ref 26.6–33)
MCHC RBC AUTO-ENTMCNC: 32.8 G/DL (ref 31.5–35.7)
MCV RBC AUTO: 87.9 FL (ref 79–97)
MONOCYTES # BLD AUTO: 0.15 10*3/MM3 (ref 0.1–0.9)
MONOCYTES NFR BLD AUTO: 1.7 % (ref 5–12)
NEUTROPHILS # BLD AUTO: 7.95 10*3/MM3 (ref 1.4–7)
NEUTROPHILS NFR BLD AUTO: 89 % (ref 42.7–76)
NRBC BLD AUTO-RTO: 0 /100 WBC (ref 0–0)
PLATELET # BLD AUTO: 290 10*3/MM3 (ref 140–450)
PMV BLD AUTO: 9.8 FL (ref 6–12)
POTASSIUM BLD-SCNC: 4.5 MMOL/L (ref 3.5–5.1)
PROT SERPL-MCNC: 7.1 G/DL (ref 6.3–8.2)
RBC # BLD AUTO: 4.3 10*6/MM3 (ref 3.77–5.28)
SODIUM BLD-SCNC: 136 MMOL/L (ref 137–145)
WBC NRBC COR # BLD: 8.93 10*3/MM3 (ref 3.4–10.8)

## 2019-03-28 PROCEDURE — 99214 OFFICE O/P EST MOD 30 MIN: CPT | Performed by: NURSE PRACTITIONER

## 2019-03-28 PROCEDURE — 25010000002 DOCETAXEL 20 MG/ML SOLUTION 4 ML VIAL: Performed by: NURSE PRACTITIONER

## 2019-03-28 PROCEDURE — 80053 COMPREHEN METABOLIC PANEL: CPT

## 2019-03-28 PROCEDURE — 25010000002 PEGFILGRASTIM 6 MG/0.6ML PREFILLED SYRINGE KIT: Performed by: NURSE PRACTITIONER

## 2019-03-28 PROCEDURE — 36415 COLL VENOUS BLD VENIPUNCTURE: CPT

## 2019-03-28 PROCEDURE — 85025 COMPLETE CBC W/AUTO DIFF WBC: CPT

## 2019-03-28 PROCEDURE — 96377 APPLICATON ON-BODY INJECTOR: CPT

## 2019-03-28 PROCEDURE — 25010000002 CYCLOPHOSPHAMIDE PER 100 MG: Performed by: NURSE PRACTITIONER

## 2019-03-28 PROCEDURE — 96375 TX/PRO/DX INJ NEW DRUG ADDON: CPT

## 2019-03-28 PROCEDURE — 96417 CHEMO IV INFUS EACH ADDL SEQ: CPT

## 2019-03-28 PROCEDURE — 25010000002 PALONOSETRON PER 25 MCG: Performed by: NURSE PRACTITIONER

## 2019-03-28 PROCEDURE — 96413 CHEMO IV INFUSION 1 HR: CPT

## 2019-03-28 RX ORDER — PALONOSETRON 0.05 MG/ML
0.25 INJECTION, SOLUTION INTRAVENOUS ONCE
Status: COMPLETED | OUTPATIENT
Start: 2019-03-28 | End: 2019-03-28

## 2019-03-28 RX ORDER — SODIUM CHLORIDE 9 MG/ML
250 INJECTION, SOLUTION INTRAVENOUS ONCE
Status: CANCELLED | OUTPATIENT
Start: 2019-03-28

## 2019-03-28 RX ORDER — SODIUM CHLORIDE 9 MG/ML
250 INJECTION, SOLUTION INTRAVENOUS ONCE
Status: DISCONTINUED | OUTPATIENT
Start: 2019-03-28 | End: 2019-03-28 | Stop reason: HOSPADM

## 2019-03-28 RX ORDER — PALONOSETRON 0.05 MG/ML
0.25 INJECTION, SOLUTION INTRAVENOUS ONCE
Status: CANCELLED | OUTPATIENT
Start: 2019-03-28

## 2019-03-28 RX ADMIN — CYCLOPHOSPHAMIDE 1160 MG: 500 INJECTION, POWDER, FOR SOLUTION INTRAVENOUS; ORAL at 13:03

## 2019-03-28 RX ADMIN — PALONOSETRON HYDROCHLORIDE 0.25 MG: 0.25 INJECTION INTRAVENOUS at 11:16

## 2019-03-28 RX ADMIN — DOCETAXEL 145 MG: 20 INJECTION, SOLUTION, CONCENTRATE INTRAVENOUS at 11:59

## 2019-03-28 RX ADMIN — PEGFILGRASTIM 6 MG: KIT SUBCUTANEOUS at 13:33

## 2019-03-29 ENCOUNTER — INFUSION (OUTPATIENT)
Dept: ONCOLOGY | Facility: HOSPITAL | Age: 64
End: 2019-03-29

## 2019-03-29 VITALS — SYSTOLIC BLOOD PRESSURE: 166 MMHG | HEART RATE: 61 BPM | DIASTOLIC BLOOD PRESSURE: 90 MMHG | TEMPERATURE: 97.6 F

## 2019-03-29 PROCEDURE — 25010000002 PEGFILGRASTIM 6 MG/0.6ML PREFILLED SYRINGE KIT: Performed by: INTERNAL MEDICINE

## 2019-03-29 PROCEDURE — 96377 APPLICATON ON-BODY INJECTOR: CPT

## 2019-03-29 RX ADMIN — PEGFILGRASTIM 6 MG: KIT SUBCUTANEOUS at 12:41

## 2019-03-29 NOTE — CODE DOCUMENTATION
Pt here today for Neulasta on body injector application. Reports yesterday approx 6pm she bumped into table and Neulasta device fell off. Reports called DR Whiting on call and was instructed to return to clinic in the AM for new device. Device returned to pharmacy and new device applied.

## 2019-04-02 ENCOUNTER — DOCUMENTATION (OUTPATIENT)
Dept: NUTRITION | Facility: HOSPITAL | Age: 64
End: 2019-04-02

## 2019-04-02 NOTE — PROGRESS NOTES
Nutrition Services    Patient Name:  Ava Mota  YOB: 1955  MRN: [id number]  Admit Date:  (Not on file)    RD phoned pt. For nutritional follow up. LEAH left voicemail on pt.'s phone with nutrition program contact information. LEAH encouraged pt. To call with nutrition questions or any nutrition issues that may arise. LEAH also instructed pt. To let oncology staff know if they have any nutritional issues in the future that we may be of assistance with.     Electronically signed by:  Pily Garcia RD  04/02/19 4:22 PM

## 2019-04-08 ENCOUNTER — TELEPHONE (OUTPATIENT)
Dept: ONCOLOGY | Facility: CLINIC | Age: 64
End: 2019-04-08

## 2019-04-08 RX ORDER — LEVOFLOXACIN 500 MG/1
500 TABLET, FILM COATED ORAL DAILY
Qty: 7 TABLET | Refills: 0 | Status: SHIPPED | OUTPATIENT
Start: 2019-04-08 | End: 2019-04-15

## 2019-04-08 NOTE — TELEPHONE ENCOUNTER
Received call from pt through triage line. Over the weekend- on Saturday, she has developed sore throat, nasal drainage, congestion, and cough. Denies fever. Pt is taking zyrtec and flonase. Suggested she try claritin over zyrtec, because that may help with neulasta side effects as well as allergy symptoms. Also ok to try OTC cold medications such as mucinex and robitussen. Discussed with Dr Braun. Rx escribed for lovenox.

## 2019-04-18 ENCOUNTER — INFUSION (OUTPATIENT)
Dept: ONCOLOGY | Facility: HOSPITAL | Age: 64
End: 2019-04-18

## 2019-04-18 ENCOUNTER — OFFICE VISIT (OUTPATIENT)
Dept: ONCOLOGY | Facility: CLINIC | Age: 64
End: 2019-04-18

## 2019-04-18 VITALS
WEIGHT: 205 LBS | HEART RATE: 88 BPM | RESPIRATION RATE: 14 BRPM | BODY MASS INDEX: 37.73 KG/M2 | SYSTOLIC BLOOD PRESSURE: 162 MMHG | DIASTOLIC BLOOD PRESSURE: 81 MMHG | HEIGHT: 62 IN | TEMPERATURE: 97.2 F

## 2019-04-18 DIAGNOSIS — Z17.1 MALIGNANT NEOPLASM OF UPPER-OUTER QUADRANT OF LEFT BREAST IN FEMALE, ESTROGEN RECEPTOR NEGATIVE (HCC): ICD-10-CM

## 2019-04-18 DIAGNOSIS — Z17.1 MALIGNANT NEOPLASM OF UPPER-OUTER QUADRANT OF LEFT BREAST IN FEMALE, ESTROGEN RECEPTOR NEGATIVE (HCC): Primary | ICD-10-CM

## 2019-04-18 DIAGNOSIS — C50.412 MALIGNANT NEOPLASM OF UPPER-OUTER QUADRANT OF LEFT BREAST IN FEMALE, ESTROGEN RECEPTOR NEGATIVE (HCC): ICD-10-CM

## 2019-04-18 DIAGNOSIS — C50.412 MALIGNANT NEOPLASM OF UPPER-OUTER QUADRANT OF LEFT BREAST IN FEMALE, ESTROGEN RECEPTOR NEGATIVE (HCC): Primary | ICD-10-CM

## 2019-04-18 LAB
ALBUMIN SERPL-MCNC: 3.8 G/DL (ref 3.5–5)
ALBUMIN/GLOB SERPL: 1.3 G/DL (ref 1–2)
ALP SERPL-CCNC: 90 U/L (ref 38–126)
ALT SERPL W P-5'-P-CCNC: 30 U/L (ref 13–69)
ANION GAP SERPL CALCULATED.3IONS-SCNC: 11 MMOL/L (ref 10–20)
AST SERPL-CCNC: 21 U/L (ref 15–46)
BASOPHILS # BLD AUTO: 0.02 10*3/MM3 (ref 0–0.2)
BASOPHILS NFR BLD AUTO: 0.2 % (ref 0–1.5)
BILIRUB SERPL-MCNC: 1.7 MG/DL (ref 0.2–1.3)
BUN BLD-MCNC: 16 MG/DL (ref 7–20)
BUN/CREAT SERPL: 26.7 (ref 7.1–23.5)
CALCIUM SPEC-SCNC: 9.6 MG/DL (ref 8.4–10.2)
CHLORIDE SERPL-SCNC: 105 MMOL/L (ref 98–107)
CO2 SERPL-SCNC: 25 MMOL/L (ref 26–30)
CREAT BLD-MCNC: 0.6 MG/DL (ref 0.6–1.3)
DEPRECATED RDW RBC AUTO: 49.7 FL (ref 37–54)
EOSINOPHIL # BLD AUTO: 0 10*3/MM3 (ref 0–0.4)
EOSINOPHIL NFR BLD AUTO: 0 % (ref 0.3–6.2)
ERYTHROCYTE [DISTWIDTH] IN BLOOD BY AUTOMATED COUNT: 16 % (ref 12.3–15.4)
GFR SERPL CREATININE-BSD FRML MDRD: 100 ML/MIN/1.73
GLOBULIN UR ELPH-MCNC: 2.9 GM/DL
GLUCOSE BLD-MCNC: 272 MG/DL (ref 74–98)
HCT VFR BLD AUTO: 34.7 % (ref 34–46.6)
HGB BLD-MCNC: 11.4 G/DL (ref 12–15.9)
IMM GRANULOCYTES # BLD AUTO: 0.05 10*3/MM3 (ref 0–0.05)
IMM GRANULOCYTES NFR BLD AUTO: 0.6 % (ref 0–0.5)
LYMPHOCYTES # BLD AUTO: 0.65 10*3/MM3 (ref 0.7–3.1)
LYMPHOCYTES NFR BLD AUTO: 7.2 % (ref 19.6–45.3)
MCH RBC QN AUTO: 28.5 PG (ref 26.6–33)
MCHC RBC AUTO-ENTMCNC: 32.9 G/DL (ref 31.5–35.7)
MCV RBC AUTO: 86.8 FL (ref 79–97)
MONOCYTES # BLD AUTO: 0.22 10*3/MM3 (ref 0.1–0.9)
MONOCYTES NFR BLD AUTO: 2.4 % (ref 5–12)
NEUTROPHILS # BLD AUTO: 8.1 10*3/MM3 (ref 1.4–7)
NEUTROPHILS NFR BLD AUTO: 89.6 % (ref 42.7–76)
NRBC BLD AUTO-RTO: 0 /100 WBC (ref 0–0)
PLATELET # BLD AUTO: 349 10*3/MM3 (ref 140–450)
PMV BLD AUTO: 9.4 FL (ref 6–12)
POTASSIUM BLD-SCNC: 4 MMOL/L (ref 3.5–5.1)
PROT SERPL-MCNC: 6.7 G/DL (ref 6.3–8.2)
RBC # BLD AUTO: 4 10*6/MM3 (ref 3.77–5.28)
SODIUM BLD-SCNC: 137 MMOL/L (ref 137–145)
WBC NRBC COR # BLD: 9.04 10*3/MM3 (ref 3.4–10.8)

## 2019-04-18 PROCEDURE — 25010000002 PALONOSETRON PER 25 MCG: Performed by: INTERNAL MEDICINE

## 2019-04-18 PROCEDURE — 25010000002 CYCLOPHOSPHAMIDE PER 100 MG: Performed by: INTERNAL MEDICINE

## 2019-04-18 PROCEDURE — 96413 CHEMO IV INFUSION 1 HR: CPT

## 2019-04-18 PROCEDURE — 96417 CHEMO IV INFUS EACH ADDL SEQ: CPT

## 2019-04-18 PROCEDURE — 99214 OFFICE O/P EST MOD 30 MIN: CPT | Performed by: INTERNAL MEDICINE

## 2019-04-18 PROCEDURE — 25010000002 DOCETAXEL 20 MG/ML SOLUTION 4 ML VIAL: Performed by: INTERNAL MEDICINE

## 2019-04-18 PROCEDURE — 25010000002 PEGFILGRASTIM 6 MG/0.6ML PREFILLED SYRINGE KIT: Performed by: INTERNAL MEDICINE

## 2019-04-18 PROCEDURE — 36415 COLL VENOUS BLD VENIPUNCTURE: CPT

## 2019-04-18 PROCEDURE — 80053 COMPREHEN METABOLIC PANEL: CPT

## 2019-04-18 PROCEDURE — 96377 APPLICATON ON-BODY INJECTOR: CPT

## 2019-04-18 PROCEDURE — 96375 TX/PRO/DX INJ NEW DRUG ADDON: CPT

## 2019-04-18 PROCEDURE — 85025 COMPLETE CBC W/AUTO DIFF WBC: CPT

## 2019-04-18 RX ORDER — PALONOSETRON 0.05 MG/ML
0.25 INJECTION, SOLUTION INTRAVENOUS ONCE
Status: CANCELLED | OUTPATIENT
Start: 2019-04-18

## 2019-04-18 RX ORDER — SODIUM CHLORIDE 9 MG/ML
250 INJECTION, SOLUTION INTRAVENOUS ONCE
Status: DISCONTINUED | OUTPATIENT
Start: 2019-04-18 | End: 2019-04-18 | Stop reason: HOSPADM

## 2019-04-18 RX ORDER — SODIUM CHLORIDE 9 MG/ML
250 INJECTION, SOLUTION INTRAVENOUS ONCE
Status: CANCELLED | OUTPATIENT
Start: 2019-04-18

## 2019-04-18 RX ORDER — PALONOSETRON 0.05 MG/ML
0.25 INJECTION, SOLUTION INTRAVENOUS ONCE
Status: COMPLETED | OUTPATIENT
Start: 2019-04-18 | End: 2019-04-18

## 2019-04-18 RX ADMIN — CYCLOPHOSPHAMIDE 1160 MG: 500 INJECTION, POWDER, FOR SOLUTION INTRAVENOUS; ORAL at 11:52

## 2019-04-18 RX ADMIN — PALONOSETRON HYDROCHLORIDE 0.25 MG: 0.25 INJECTION INTRAVENOUS at 10:31

## 2019-04-18 RX ADMIN — DOCETAXEL 145 MG: 20 INJECTION, SOLUTION, CONCENTRATE INTRAVENOUS at 10:48

## 2019-04-18 RX ADMIN — PEGFILGRASTIM 6 MG: KIT SUBCUTANEOUS at 12:29

## 2019-04-18 NOTE — PROGRESS NOTES
PROBLEM LIST:     Malignant neoplasm of upper-outer quadrant of left breast in female, estrogen receptor negative (CMS/HCC)    1/15/2019 Initial Diagnosis     Malignant neoplasm of upper-outer quadrant of left breast in female, estrogen receptor negative (CMS/HCC)  Abnormal mammogram and a biopsy-proven poorly differentiated infiltrating ductal carcinoma in the left 3 o'clock position measuring 6 mm.                2/8/2019 Surgery     Surgery  Lumpectomy by Dr. PATINO on 2/8/2019.   Grade 3, high grade 6 mm tumor with margins negative by 2 mm.    Tumor was ER/OH-negative HER-2/jakob negative and 0 of 1 lymph node was positive.                 3/5/2019 Imaging     CT and bone scan scheduled 03/05/2019         3/5/2019 -  Chemotherapy     OP BREAST TC DOCEtaxel / Cyclophosphamide  Start date 03/07/2019 pending.   Education 03/05/2019            REASON FOR VISIT: Triple Negative Breast cancer    HISTORY OF PRESENT ILLNESS:   64 y.o.  female presents today for management of triple negative breast cancer.  She is receiving adjuvant chemotherapy with Taxotere and Cytoxan.  She is completed 2 cycles.  She has the usual side effects we see with this regimen including sinus issues and some fatigue.  She has mild neuropathy that seems to resolve prior to the cycle.  Otherwise no infections that is noted.  No fevers over the last 3 weeks.    Past medical history, social history and family history was reviewed and unchanged from prior visit.    Review of Systems:    Review of Systems   Constitutional: Positive for fatigue.   HENT:  Negative.    Eyes: Negative.    Respiratory: Negative.    Cardiovascular: Negative.    Gastrointestinal: Negative.    Endocrine: Negative.    Genitourinary: Negative.     Musculoskeletal: Negative.    Skin: Negative.    Neurological: Negative.    Hematological: Negative.    Psychiatric/Behavioral: Negative.       A comprehensive 14 point review of systems was performed and was negative except as  "mentioned.      Medications:  The current medication list was reviewed in the EMR    ALLERGIES:    Allergies   Allergen Reactions   • Iodine Anaphylaxis     PT STATES SHE 'PASSED OUT ' AFTER CT CONTRAST.          Physical Exam    VITAL SIGNS:  /81   Pulse 88   Temp 97.2 °F (36.2 °C) (Temporal)   Resp 14   Ht 157.5 cm (62\")   Wt 93 kg (205 lb)   BMI 37.49 kg/m²     Wt Readings from Last 3 Encounters:   04/18/19 93 kg (205 lb)   03/28/19 92.5 kg (204 lb)   03/07/19 91.6 kg (202 lb)        Performance Status: 1    General: well appearing, in no acute distress  HEENT: sclera anicteric, oropharynx clear, neck is supple  Lymphatics: no cervical, supraclavicular, or axillary adenopathy  Cardiovascular: regular rate and rhythm, no murmurs, rubs or gallops  Lungs: clear to auscultation bilaterally  Abdomen: soft, nontender, nondistended.  No palpable organomegaly  Extremities: no lower extremity edema  Skin: no rashes, lesions, bruising, or petechiae  Msk:  Shows no weakness of the large muscle groups  Psych: Mood is stable        RECENT LABS:    Lab Results   Component Value Date    HGB 11.4 (L) 04/18/2019    HCT 34.7 04/18/2019    MCV 86.8 04/18/2019     04/18/2019    WBC 9.04 04/18/2019    NEUTROABS 8.10 (H) 04/18/2019    LYMPHSABS 0.65 (L) 04/18/2019    MONOSABS 0.22 04/18/2019    EOSABS 0.00 04/18/2019    BASOSABS 0.02 04/18/2019       Lab Results   Component Value Date    GLUCOSE 280 (H) 03/28/2019    BUN 19 03/28/2019    CREATININE 0.80 03/28/2019     (L) 03/28/2019    K 4.5 03/28/2019     03/28/2019    CO2 19.0 (L) 03/28/2019    CALCIUM 9.5 03/28/2019    PROTEINTOT 7.1 03/28/2019    ALBUMIN 4.10 03/28/2019    BILITOT 0.9 03/28/2019    ALKPHOS 98 03/28/2019    AST 21 03/28/2019    ALT 30 03/28/2019       Ct Abdomen Pelvis Without Contrast    Result Date: 3/5/2019  No evidence of metastatic disease involving the chest, abdomen or pelvis.  This study was performed with techniques to keep " radiation doses as low as reasonably achievable (ALARA). Individualized dose reduction techniques using automated exposure control or adjustment of mA and/or kV according to the patient size were employed.  This report was finalized on 3/5/2019 2:45 PM by Dariel Lan M.D..    Ct Chest Without Contrast    Result Date: 3/5/2019  No evidence of metastatic disease involving the chest, abdomen or pelvis.  This study was performed with techniques to keep radiation doses as low as reasonably achievable (ALARA). Individualized dose reduction techniques using automated exposure control or adjustment of mA and/or kV according to the patient size were employed.  This report was finalized on 3/5/2019 2:45 PM by Dariel Lan M.D..    Nm Bone Scan Whole Body    Result Date: 3/5/2019  No scintigraphic evidence of metastatic disease.   This report was finalized on 3/5/2019 4:11 PM by Dariel Lan M.D..    Mri Breast Bilateral Diagnostic With & Without Contrast    Result Date: 1/8/2019  1. Biopsy-proven 0.5-0.6 cm invasive ductal carcinoma in the far posterior left 3:00 region. There is no evidence of multifocal/multicentric disease in the left breast. 2. Negative right breast MRI. 3. No MRI evidence of lymphadenopathy.  RECOMMENDATIONS: Surgical follow-up with Dr. Angel.   BI-RADS CATEGORY: 6, KNOWN BIOPSY PROVEN MALIGNANCY.  FINAL MRI RESULTS AND RECOMMENDATIONS WILL BE CALLED TO THE PATIENT BY OUR LEAD BREAST MRI TECHNOLOGIST.  This report was finalized on 1/8/2019 4:37 PM by Dr. Patti Quinonez MD.      Nm [place] Node Injection Only    Result Date: 2/8/2019  Radiopharmaceutical filtered sulfur colloid used for the injection for left breast lymphoscintigraphy and sentinel node mapping.  DICTATED:   2/8/2019 EDITED/ls :   2/8/2019    This report was finalized on 2/8/2019 4:42 PM by Dr. Fish Diaz.            Assessment/Plan  1. Stage IB triple negative ductal carcinoma of the left breast status post  lumpectomy.    Continue cycle #3 of 4 of Taxotere and Cytoxan with no dose reduction.  I plan to refer her for radiotherapy after her fourth cycle of chemotherapy.  With    2. Peripheral neuropathy is Grade 1-2.  We will continue to monitor.  She has no current symptoms. We discussed that she should notify us if neuropathy persists. We will consider dose reducing Taxotere at that time.     3. Chemotherapy related nausea: We will continue Zofran.  We discussed if nausea persists despite Zofran she should contact the office and we will send in another prescription. She will notify the clinic if she goes more that 24 hrs without eating or drinking. We reviewed that she should drink at least 2-3 quarts of fluids per day to maintain hydration.    4. Chemotherapy related diarrhea: We discussed that she should take imodium if she has 4-6 bowel movements in a 24 hours period.  The patient will notify us if she has further concerns.          I spent a total of 25 minutes in direct patient care, greater than 17 minutes (greater than 50%) were spent in coordination of care, and counseling the patient regarding  treatment, side effects, and management of breast cancer . Answered any questions patient had with medication and plan.      Jacy Uribe MD  [facility] Hematology and Oncology         Orders Placed This Encounter   Procedures   • Comprehensive metabolic panel   • Ambulatory Referral to Radiation Oncology   • CBC and Differential       4/18/2019         Please note that portions of this note may have been completed with a voice recognition program. Efforts were made to edit the dictations, but occasionally words are mistranscribed.

## 2019-05-06 NOTE — PROGRESS NOTES
PROBLEM LIST:     Malignant neoplasm of upper-outer quadrant of left breast in female, estrogen receptor negative (CMS/HCC)    1/15/2019 Initial Diagnosis     Malignant neoplasm of upper-outer quadrant of left breast in female, estrogen receptor negative (CMS/HCC)  Abnormal mammogram and a biopsy-proven poorly differentiated infiltrating ductal carcinoma in the left 3 o'clock position measuring 6 mm.                2/8/2019 Surgery     Surgery  Lumpectomy by Dr. PATINO on 2/8/2019.   Grade 3, high grade 6 mm tumor with margins negative by 2 mm.    Tumor was ER/TN-negative HER-2/jakob negative and 0 of 1 lymph node was positive.                 3/5/2019 Imaging     CT and bone scan scheduled 03/05/2019         3/5/2019 -  Chemotherapy     OP BREAST TC DOCEtaxel / Cyclophosphamide  Start date 03/07/2019 pending.   Education 03/05/2019            REASON FOR VISIT: Triple Negative Breast cancer    HISTORY OF PRESENT ILLNESS:   64 y.o.  female presents today for management of triple negative breast cancer.  She is receiving adjuvant chemotherapy with Taxotere and Cytoxan.  She is completed 3 cycles.  She reports sinus issues, some nausea, and some fatigue.  She has some worsening neuropathy in between cycles that seems to still happen on occasion.  She lost one toenail and was placed on antibiotics.  Otherwise no infections that is noted. Denies fevers over the last 3 weeks.    Past medical history, social history and family history was reviewed and unchanged from prior visit.    Review of Systems:    Review of Systems   Constitutional: Positive for fatigue.   HENT:  Negative.    Eyes: Negative.    Respiratory: Negative.    Cardiovascular: Negative.    Gastrointestinal: Negative.    Endocrine: Negative.    Genitourinary: Negative.     Musculoskeletal: Negative.    Skin: Negative.    Neurological: Negative.    Hematological: Negative.    Psychiatric/Behavioral: Negative.       A comprehensive 14 point review of systems was  "performed and was negative except as mentioned.      Medications:  The current medication list was reviewed in the EMR    ALLERGIES:    Allergies   Allergen Reactions   • Iodine Anaphylaxis     PT STATES SHE 'PASSED OUT ' AFTER CT CONTRAST.          Physical Exam    VITAL SIGNS:  BP (!) 217/92   Pulse 94   Temp 97.3 °F (36.3 °C) (Temporal)   Resp 13   Ht 157.5 cm (62\")   Wt 93.9 kg (207 lb)   BMI 37.86 kg/m²     Wt Readings from Last 3 Encounters:   05/09/19 93.9 kg (207 lb)   04/18/19 93 kg (205 lb)   03/28/19 92.5 kg (204 lb)        Performance Status: 1    General: well appearing, in no acute distress  HEENT: sclera anicteric, oropharynx clear, neck is supple  Lymphatics: no cervical, supraclavicular, or axillary adenopathy  Cardiovascular: regular rate and rhythm, no murmurs, rubs or gallops  Lungs: clear to auscultation bilaterally  Abdomen: soft, nontender, nondistended.  No palpable organomegaly  Extremities: no lower extremity edema  Skin: no rashes, lesions, bruising, or petechiae  Msk:  Shows no weakness of the large muscle groups  Psych: Mood is stable        RECENT LABS:    Lab Results   Component Value Date    HGB 11.4 (L) 04/18/2019    HCT 34.7 04/18/2019    MCV 86.8 04/18/2019     04/18/2019    WBC 9.04 04/18/2019    NEUTROABS 8.10 (H) 04/18/2019    LYMPHSABS 0.65 (L) 04/18/2019    MONOSABS 0.22 04/18/2019    EOSABS 0.00 04/18/2019    BASOSABS 0.02 04/18/2019       Lab Results   Component Value Date    GLUCOSE 272 (H) 04/18/2019    BUN 16 04/18/2019    CREATININE 0.60 04/18/2019     04/18/2019    K 4.0 04/18/2019     04/18/2019    CO2 25.0 (L) 04/18/2019    CALCIUM 9.6 04/18/2019    PROTEINTOT 6.7 04/18/2019    ALBUMIN 3.80 04/18/2019    BILITOT 1.7 (H) 04/18/2019    ALKPHOS 90 04/18/2019    AST 21 04/18/2019    ALT 30 04/18/2019       Ct Abdomen Pelvis Without Contrast    Result Date: 3/5/2019  No evidence of metastatic disease involving the chest, abdomen or pelvis.  This " study was performed with techniques to keep radiation doses as low as reasonably achievable (ALARA). Individualized dose reduction techniques using automated exposure control or adjustment of mA and/or kV according to the patient size were employed.  This report was finalized on 3/5/2019 2:45 PM by Dariel Lan M.D..    Ct Chest Without Contrast    Result Date: 3/5/2019  No evidence of metastatic disease involving the chest, abdomen or pelvis.  This study was performed with techniques to keep radiation doses as low as reasonably achievable (ALARA). Individualized dose reduction techniques using automated exposure control or adjustment of mA and/or kV according to the patient size were employed.  This report was finalized on 3/5/2019 2:45 PM by Dariel Lan M.D..    Nm Bone Scan Whole Body    Result Date: 3/5/2019  No scintigraphic evidence of metastatic disease.   This report was finalized on 3/5/2019 4:11 PM by Dariel Lan M.D..    Mri Breast Bilateral Diagnostic With & Without Contrast    Result Date: 1/8/2019  1. Biopsy-proven 0.5-0.6 cm invasive ductal carcinoma in the far posterior left 3:00 region. There is no evidence of multifocal/multicentric disease in the left breast. 2. Negative right breast MRI. 3. No MRI evidence of lymphadenopathy.  RECOMMENDATIONS: Surgical follow-up with Dr. Angel.   BI-RADS CATEGORY: 6, KNOWN BIOPSY PROVEN MALIGNANCY.  FINAL MRI RESULTS AND RECOMMENDATIONS WILL BE CALLED TO THE PATIENT BY OUR LEAD BREAST MRI TECHNOLOGIST.  This report was finalized on 1/8/2019 4:37 PM by Dr. Patti Quinonez MD.      Nm Manteno Node Injection Only    Result Date: 2/8/2019  Radiopharmaceutical filtered sulfur colloid used for the injection for left breast lymphoscintigraphy and sentinel node mapping.  DICTATED:   2/8/2019 EDITED/ls :   2/8/2019    This report was finalized on 2/8/2019 4:42 PM by Dr. Fish Diaz.            Assessment/Plan  1. Stage IB triple negative ductal  carcinoma of the left breast status post lumpectomy.    Continue cycle #4 of 4 of with a 10% dose reduction on Taxotere and Cytoxan with no dose reduction.  I will refer her for radiotherapy today. She has scheduled appointment on 05/30/2019.  We will fill out paperwork for radiation to assist with [facility].     2. Peripheral neuropathy is Grade 1-2.  We will continue to monitor.  She has no current symptoms. We discussed that she should notify us if neuropathy persists. We will dose reduce Taxotere 10% today.    3. Chemotherapy related nausea: We will continue Zofran.  We discussed if nausea persists despite Zofran she should contact the office and we will send in another prescription. She will notify the clinic if she goes more that 24 hrs without eating or drinking.     4. Chemotherapy related diarrhea: We discussed that she should take imodium if she has 4-6 bowel movements in a 24 hours period.  The patient will notify us if she has further concerns.    Follow up in August after radiation and surya with Dr. PATINO          I spent a total of 25 minutes in direct patient care, greater than 17 minutes (greater than 50%) were spent in coordination of care, and counseling the patient regarding  treatment, side effects, and management of breast cancer . Answered any questions patient had with medication and plan.      Mila Pelayo APRELIESER  [facility] Hematology and Oncology    Return on: 08/29/19    Orders Placed This Encounter   Procedures   • Comprehensive metabolic panel   • CBC and Differential       5/9/2019

## 2019-05-09 ENCOUNTER — INFUSION (OUTPATIENT)
Dept: ONCOLOGY | Facility: HOSPITAL | Age: 64
End: 2019-05-09

## 2019-05-09 ENCOUNTER — OFFICE VISIT (OUTPATIENT)
Dept: ONCOLOGY | Facility: CLINIC | Age: 64
End: 2019-05-09

## 2019-05-09 VITALS — HEART RATE: 87 BPM | DIASTOLIC BLOOD PRESSURE: 90 MMHG | SYSTOLIC BLOOD PRESSURE: 168 MMHG | TEMPERATURE: 97.6 F

## 2019-05-09 VITALS
SYSTOLIC BLOOD PRESSURE: 217 MMHG | HEIGHT: 62 IN | TEMPERATURE: 97.3 F | DIASTOLIC BLOOD PRESSURE: 92 MMHG | RESPIRATION RATE: 13 BRPM | WEIGHT: 207 LBS | HEART RATE: 94 BPM | BODY MASS INDEX: 38.09 KG/M2

## 2019-05-09 DIAGNOSIS — C50.412 MALIGNANT NEOPLASM OF UPPER-OUTER QUADRANT OF LEFT BREAST IN FEMALE, ESTROGEN RECEPTOR NEGATIVE (HCC): ICD-10-CM

## 2019-05-09 DIAGNOSIS — Z17.1 MALIGNANT NEOPLASM OF UPPER-OUTER QUADRANT OF LEFT BREAST IN FEMALE, ESTROGEN RECEPTOR NEGATIVE (HCC): ICD-10-CM

## 2019-05-09 DIAGNOSIS — Z17.1 MALIGNANT NEOPLASM OF UPPER-OUTER QUADRANT OF LEFT BREAST IN FEMALE, ESTROGEN RECEPTOR NEGATIVE (HCC): Primary | ICD-10-CM

## 2019-05-09 DIAGNOSIS — C50.412 MALIGNANT NEOPLASM OF UPPER-OUTER QUADRANT OF LEFT BREAST IN FEMALE, ESTROGEN RECEPTOR NEGATIVE (HCC): Primary | ICD-10-CM

## 2019-05-09 LAB
ALBUMIN SERPL-MCNC: 3.7 G/DL (ref 3.5–5)
ALBUMIN/GLOB SERPL: 1.3 G/DL (ref 1–2)
ALP SERPL-CCNC: 81 U/L (ref 38–126)
ALT SERPL W P-5'-P-CCNC: 33 U/L (ref 13–69)
ANION GAP SERPL CALCULATED.3IONS-SCNC: 13 MMOL/L (ref 10–20)
AST SERPL-CCNC: 27 U/L (ref 15–46)
BASOPHILS # BLD AUTO: 0.02 10*3/MM3 (ref 0–0.2)
BASOPHILS NFR BLD AUTO: 0.3 % (ref 0–1.5)
BILIRUB SERPL-MCNC: 1.8 MG/DL (ref 0.2–1.3)
BUN BLD-MCNC: 17 MG/DL (ref 7–20)
BUN/CREAT SERPL: 24.3 (ref 7.1–23.5)
CALCIUM SPEC-SCNC: 9 MG/DL (ref 8.4–10.2)
CHLORIDE SERPL-SCNC: 104 MMOL/L (ref 98–107)
CO2 SERPL-SCNC: 25 MMOL/L (ref 26–30)
CREAT BLD-MCNC: 0.7 MG/DL (ref 0.6–1.3)
DEPRECATED RDW RBC AUTO: 58.4 FL (ref 37–54)
EOSINOPHIL # BLD AUTO: 0 10*3/MM3 (ref 0–0.4)
EOSINOPHIL NFR BLD AUTO: 0 % (ref 0.3–6.2)
ERYTHROCYTE [DISTWIDTH] IN BLOOD BY AUTOMATED COUNT: 17.9 % (ref 12.3–15.4)
GFR SERPL CREATININE-BSD FRML MDRD: 84 ML/MIN/1.73
GLOBULIN UR ELPH-MCNC: 2.9 GM/DL
GLUCOSE BLD-MCNC: 168 MG/DL (ref 74–98)
HCT VFR BLD AUTO: 34.1 % (ref 34–46.6)
HGB BLD-MCNC: 11.5 G/DL (ref 12–15.9)
IMM GRANULOCYTES # BLD AUTO: 0.07 10*3/MM3 (ref 0–0.05)
IMM GRANULOCYTES NFR BLD AUTO: 0.9 % (ref 0–0.5)
LYMPHOCYTES # BLD AUTO: 0.6 10*3/MM3 (ref 0.7–3.1)
LYMPHOCYTES NFR BLD AUTO: 8.1 % (ref 19.6–45.3)
MCH RBC QN AUTO: 30.4 PG (ref 26.6–33)
MCHC RBC AUTO-ENTMCNC: 33.7 G/DL (ref 31.5–35.7)
MCV RBC AUTO: 90.2 FL (ref 79–97)
MONOCYTES # BLD AUTO: 0.17 10*3/MM3 (ref 0.1–0.9)
MONOCYTES NFR BLD AUTO: 2.3 % (ref 5–12)
NEUTROPHILS # BLD AUTO: 6.53 10*3/MM3 (ref 1.7–7)
NEUTROPHILS NFR BLD AUTO: 88.4 % (ref 42.7–76)
NRBC BLD AUTO-RTO: 0 /100 WBC (ref 0–0.2)
PLATELET # BLD AUTO: 299 10*3/MM3 (ref 140–450)
PMV BLD AUTO: 9 FL (ref 6–12)
POTASSIUM BLD-SCNC: 4 MMOL/L (ref 3.5–5.1)
PROT SERPL-MCNC: 6.6 G/DL (ref 6.3–8.2)
RBC # BLD AUTO: 3.78 10*6/MM3 (ref 3.77–5.28)
SODIUM BLD-SCNC: 138 MMOL/L (ref 137–145)
WBC NRBC COR # BLD: 7.39 10*3/MM3 (ref 3.4–10.8)

## 2019-05-09 PROCEDURE — 25010000002 PEGFILGRASTIM 6 MG/0.6ML PREFILLED SYRINGE KIT: Performed by: NURSE PRACTITIONER

## 2019-05-09 PROCEDURE — 96417 CHEMO IV INFUS EACH ADDL SEQ: CPT

## 2019-05-09 PROCEDURE — 96377 APPLICATON ON-BODY INJECTOR: CPT

## 2019-05-09 PROCEDURE — 25010000002 DOCETAXEL 20 MG/ML SOLUTION 1 ML VIAL: Performed by: NURSE PRACTITIONER

## 2019-05-09 PROCEDURE — 36415 COLL VENOUS BLD VENIPUNCTURE: CPT

## 2019-05-09 PROCEDURE — 80053 COMPREHEN METABOLIC PANEL: CPT

## 2019-05-09 PROCEDURE — 99214 OFFICE O/P EST MOD 30 MIN: CPT | Performed by: NURSE PRACTITIONER

## 2019-05-09 PROCEDURE — 25010000002 DOCETAXEL 20 MG/ML SOLUTION 4 ML VIAL: Performed by: NURSE PRACTITIONER

## 2019-05-09 PROCEDURE — 96375 TX/PRO/DX INJ NEW DRUG ADDON: CPT

## 2019-05-09 PROCEDURE — 25010000002 CYCLOPHOSPHAMIDE PER 100 MG: Performed by: NURSE PRACTITIONER

## 2019-05-09 PROCEDURE — 25010000002 PALONOSETRON PER 25 MCG: Performed by: NURSE PRACTITIONER

## 2019-05-09 PROCEDURE — 96413 CHEMO IV INFUSION 1 HR: CPT

## 2019-05-09 PROCEDURE — 85025 COMPLETE CBC W/AUTO DIFF WBC: CPT

## 2019-05-09 RX ORDER — SODIUM CHLORIDE 9 MG/ML
250 INJECTION, SOLUTION INTRAVENOUS ONCE
Status: DISCONTINUED | OUTPATIENT
Start: 2019-05-09 | End: 2019-05-09 | Stop reason: HOSPADM

## 2019-05-09 RX ORDER — PALONOSETRON 0.05 MG/ML
0.25 INJECTION, SOLUTION INTRAVENOUS ONCE
Status: CANCELLED | OUTPATIENT
Start: 2019-05-09

## 2019-05-09 RX ORDER — SODIUM CHLORIDE 9 MG/ML
250 INJECTION, SOLUTION INTRAVENOUS ONCE
Status: CANCELLED | OUTPATIENT
Start: 2019-05-09

## 2019-05-09 RX ORDER — PALONOSETRON 0.05 MG/ML
0.25 INJECTION, SOLUTION INTRAVENOUS ONCE
Status: COMPLETED | OUTPATIENT
Start: 2019-05-09 | End: 2019-05-09

## 2019-05-09 RX ADMIN — CYCLOPHOSPHAMIDE 1160 MG: 500 INJECTION, POWDER, FOR SOLUTION INTRAVENOUS; ORAL at 12:27

## 2019-05-09 RX ADMIN — DOCETAXEL 130 MG: 20 INJECTION, SOLUTION, CONCENTRATE INTRAVENOUS at 11:23

## 2019-05-09 RX ADMIN — PALONOSETRON 0.25 MG: 0.05 INJECTION, SOLUTION INTRAVENOUS at 10:53

## 2019-05-09 RX ADMIN — PEGFILGRASTIM 6 MG: KIT SUBCUTANEOUS at 13:00

## 2019-05-30 ENCOUNTER — OFFICE VISIT (OUTPATIENT)
Dept: RADIATION ONCOLOGY | Facility: HOSPITAL | Age: 64
End: 2019-05-30

## 2019-05-30 ENCOUNTER — HOSPITAL ENCOUNTER (OUTPATIENT)
Dept: RADIATION ONCOLOGY | Facility: HOSPITAL | Age: 64
Setting detail: RADIATION/ONCOLOGY SERIES
Discharge: HOME | End: 2019-05-30

## 2019-05-30 VITALS
SYSTOLIC BLOOD PRESSURE: 161 MMHG | WEIGHT: 211 LBS | DIASTOLIC BLOOD PRESSURE: 70 MMHG | HEART RATE: 84 BPM | RESPIRATION RATE: 18 BRPM | HEIGHT: 62 IN | TEMPERATURE: 99 F | BODY MASS INDEX: 38.83 KG/M2

## 2019-05-30 DIAGNOSIS — Z17.1 MALIGNANT NEOPLASM OF UPPER-OUTER QUADRANT OF LEFT BREAST IN FEMALE, ESTROGEN RECEPTOR NEGATIVE (HCC): Primary | ICD-10-CM

## 2019-05-30 DIAGNOSIS — C50.412 MALIGNANT NEOPLASM OF UPPER-OUTER QUADRANT OF LEFT BREAST IN FEMALE, ESTROGEN RECEPTOR NEGATIVE (HCC): Primary | ICD-10-CM

## 2019-05-30 PROCEDURE — 77332 RADIATION TREATMENT AID(S): CPT | Performed by: RADIOLOGY

## 2019-05-30 PROCEDURE — G0463 HOSPITAL OUTPT CLINIC VISIT: HCPCS

## 2019-05-30 PROCEDURE — 77290 THER RAD SIMULAJ FIELD CPLX: CPT | Performed by: RADIOLOGY

## 2019-05-30 NOTE — PROGRESS NOTES
RE-EVALUATION NOTE    NAME:      Ava Mota  :                                                          1955  DATE OF RE-EVALUATION:                     2019  REQUESTING PHYSICIAN:                   Jacy Uribe MD  REASON FOR RE-EVALUATION:           Cancer Staging  Malignant neoplasm of upper-outer quadrant of left breast in female, estrogen receptor negative (CMS/HCC)  Staging form: Breast, AJCC 8th Edition  - Clinical stage from 2019: Stage IB (cT1b, cN0, cM0, G3, ER: Negative, ME: Negative, HER2: Negative)            BRIEF HISTORY:  Ava Mota  is a very pleasant 64 y.o. female  who had an abnormal mammogram and a biopsy-proven poorly differentiated infiltrating ductal carcinoma in the left 3 o'clock position measuring 6 mm.  She underwent lumpectomy by Dr. PATINO on 2019.  She was found to have a grade 3, high grade 6 mm tumor with margins negative by 2 mm.  The tumor was ER/ME-negative HER-2/jakob negative and 0 of 1 lymph node was positive.    On May 9 she completed 4 cycles of Taxotere and Cytoxan by Dr. Braun.  The tumor was triple negative so she will not receive hormonal blockade.  She is here to proceed with postoperative radiation.  She has no complaints today.      Allergies   Allergen Reactions   • Iodine Anaphylaxis     PT STATES SHE 'PASSED OUT ' AFTER CT CONTRAST.        Social History     Tobacco Use   • Smoking status: Former Smoker     Packs/day: 0.50     Years: 2.00     Pack years: 1.00     Types: Cigarettes     Last attempt to quit: 1972     Years since quittin.4   • Smokeless tobacco: Never Used   Substance Use Topics   • Alcohol use: No     Frequency: Never   • Drug use: No         Past Medical History:   Diagnosis Date   • Diabetes mellitus (CMS/HCC)    • Hypertension        family history includes Breast cancer in her sister; Colon cancer in her sister; Diabetes in her mother; Emphysema in her father; Heart disease in her mother; Lung cancer in her  "brother; Stroke in her mother.     Past Surgical History:   Procedure Laterality Date   • CARPAL TUNNEL RELEASE Left 2015   •  SECTION      x2   • CHOLECYSTECTOMY  1973   • COLONOSCOPY  2013   • KNEE ARTHROPLASTY Bilateral      &    • TUBAL ABDOMINAL LIGATION  1985        Review of Systems   Constitutional: Positive for fatigue.   All other systems reviewed and are negative.          Objective   VITAL SIGNS:   Vitals:    19 1047   BP: 161/70   Pulse: 84   Resp: 18   Temp: 99 °F (37.2 °C)   Weight: 95.7 kg (211 lb)   Height: 157.5 cm (62\")   PainSc: 0-No pain        KPS       90%    Physical Exam   Constitutional: She is oriented to person, place, and time. She appears well-developed and well-nourished. No distress.   HENT:   Head: Normocephalic and atraumatic.   Eyes: EOM are normal. No scleral icterus.   Neck: Neck supple.   Cardiovascular: Normal rate and regular rhythm.   Pulmonary/Chest: Effort normal and breath sounds normal.   Lymphadenopathy:     She has no cervical adenopathy.   Neurological: She is alert and oriented to person, place, and time.   Nursing note and vitals reviewed.  The breast exam is unchanged.  She has a well-healed surgical incision in the upper outer quadrant of the left breast and in the left axilla.         The following portions of the patient's history were reviewed and updated as appropriate: allergies, current medications, past family history, past medical history, past social history, past surgical history and problem list.    Assessment      IMPRESSION:   Ava Mota  underwent lumpectomy on 2019 for a grade 3, high grade 6 mm tumor with margins negative by 2 mm.  The tumor was ER/AK-negative HER-2/jakob negative and 0 of 1 lymph node was positive.    On May 9 she completed 4 cycles of Taxotere and Cytoxan by Dr. Braun.  The tumor was triple negative so she will not receive hormonal blockade.  Ms. Mota is doing well and has no " complaints.      RECOMMENDATIONS: I recommend postoperative radiotherapy of 45 Gy in 25 fractions and a tumor boost of 10 Gy in 5 fractions.  We will use deep inspiration breath-hold to spare cardiac tissue.  The pros and cons, risks and benefits of treatment were discussed and informed consent obtained.  We will proceed with treatment planning today.  Thank you very much for letting me participate in the care of Mrs. Whaley.          Beverly Rogers MD      Errors in dictation may reflect use of voice recognition software and not all errors in transcription may have been detected prior to signing.

## 2019-05-31 ENCOUNTER — TELEPHONE (OUTPATIENT)
Dept: SOCIAL WORK | Facility: HOSPITAL | Age: 64
End: 2019-05-31

## 2019-05-31 NOTE — TELEPHONE ENCOUNTER
SW completed and faxed in a [facility] referral for 6/10-7/20.  SW available for ongoing support and resource needs.

## 2019-06-03 ENCOUNTER — HOSPITAL ENCOUNTER (OUTPATIENT)
Dept: RADIATION ONCOLOGY | Facility: HOSPITAL | Age: 64
Setting detail: RADIATION/ONCOLOGY SERIES
Discharge: HOME | End: 2019-06-03

## 2019-06-10 PROCEDURE — 77295 3-D RADIOTHERAPY PLAN: CPT | Performed by: RADIOLOGY

## 2019-06-10 PROCEDURE — 77334 RADIATION TREATMENT AID(S): CPT | Performed by: RADIOLOGY

## 2019-06-10 PROCEDURE — 77300 RADIATION THERAPY DOSE PLAN: CPT | Performed by: RADIOLOGY

## 2019-06-18 ENCOUNTER — HOSPITAL ENCOUNTER (OUTPATIENT)
Dept: RADIATION ONCOLOGY | Facility: HOSPITAL | Age: 64
Discharge: HOME | End: 2019-06-18

## 2019-06-18 VITALS — BODY MASS INDEX: 38.59 KG/M2 | WEIGHT: 211 LBS

## 2019-06-18 PROCEDURE — 77280 THER RAD SIMULAJ FIELD SMPL: CPT | Performed by: RADIOLOGY

## 2019-06-19 ENCOUNTER — HOSPITAL ENCOUNTER (OUTPATIENT)
Dept: RADIATION ONCOLOGY | Facility: HOSPITAL | Age: 64
Discharge: HOME | End: 2019-06-19

## 2019-06-19 PROCEDURE — 77387 GUIDANCE FOR RADJ TX DLVR: CPT | Performed by: RADIOLOGY

## 2019-06-19 PROCEDURE — 77412 RADIATION TX DELIVERY LVL 3: CPT | Performed by: RADIOLOGY

## 2019-06-20 ENCOUNTER — HOSPITAL ENCOUNTER (OUTPATIENT)
Dept: RADIATION ONCOLOGY | Facility: HOSPITAL | Age: 64
Discharge: HOME | End: 2019-06-20

## 2019-06-20 PROCEDURE — 77387 GUIDANCE FOR RADJ TX DLVR: CPT | Performed by: RADIOLOGY

## 2019-06-20 PROCEDURE — 77412 RADIATION TX DELIVERY LVL 3: CPT | Performed by: RADIOLOGY

## 2019-06-21 ENCOUNTER — HOSPITAL ENCOUNTER (OUTPATIENT)
Dept: RADIATION ONCOLOGY | Facility: HOSPITAL | Age: 64
Discharge: HOME | End: 2019-06-21

## 2019-06-21 PROCEDURE — 77387 GUIDANCE FOR RADJ TX DLVR: CPT | Performed by: RADIOLOGY

## 2019-06-21 PROCEDURE — 77336 RADIATION PHYSICS CONSULT: CPT | Performed by: RADIOLOGY

## 2019-06-21 PROCEDURE — 77412 RADIATION TX DELIVERY LVL 3: CPT | Performed by: RADIOLOGY

## 2019-06-24 ENCOUNTER — HOSPITAL ENCOUNTER (OUTPATIENT)
Dept: RADIATION ONCOLOGY | Facility: HOSPITAL | Age: 64
Discharge: HOME | End: 2019-06-24

## 2019-06-24 PROCEDURE — 77387 GUIDANCE FOR RADJ TX DLVR: CPT | Performed by: RADIOLOGY

## 2019-06-24 PROCEDURE — 77412 RADIATION TX DELIVERY LVL 3: CPT | Performed by: RADIOLOGY

## 2019-06-25 ENCOUNTER — HOSPITAL ENCOUNTER (OUTPATIENT)
Dept: RADIATION ONCOLOGY | Facility: HOSPITAL | Age: 64
Discharge: HOME | End: 2019-06-25

## 2019-06-25 VITALS — WEIGHT: 206.8 LBS | BODY MASS INDEX: 37.82 KG/M2

## 2019-06-25 PROCEDURE — 77387 GUIDANCE FOR RADJ TX DLVR: CPT | Performed by: RADIOLOGY

## 2019-06-25 PROCEDURE — 77412 RADIATION TX DELIVERY LVL 3: CPT | Performed by: RADIOLOGY

## 2019-06-25 NOTE — PROGRESS NOTES
[facility] Nutrition    Diagnosis:  Triple negative breast cancer  Treatment:  4 cycles of Taxotere and Cytoxan; lumpectomy;  postoperative radiotherapy of 45 Gy in 25 fractions and a tumor boost of 10 Gy in 5 fractions    Consultation with patient regarding nutritional considerations with breast cancer diagnosis and lifestyle changes of weight management and physical activity.      Patient is diabetic; has been following a very strict CHO restricted diet (less than 30 grams per day) in an attempt to lose weight.  Discussed consideration for healthier diet approaches for sustained weight loss.  Patient encouraged to do daily physical exercise to aid in overall health and weight control.      She states that in the winter she does daily walking, but in the summer counts her yard work as her exercise, along with swimming laps 3-4 times per week.

## 2019-06-26 ENCOUNTER — HOSPITAL ENCOUNTER (OUTPATIENT)
Dept: RADIATION ONCOLOGY | Facility: HOSPITAL | Age: 64
Discharge: HOME | End: 2019-06-26

## 2019-06-26 PROCEDURE — 77387 GUIDANCE FOR RADJ TX DLVR: CPT | Performed by: RADIOLOGY

## 2019-06-26 PROCEDURE — 77412 RADIATION TX DELIVERY LVL 3: CPT | Performed by: RADIOLOGY

## 2019-06-27 ENCOUNTER — HOSPITAL ENCOUNTER (OUTPATIENT)
Dept: RADIATION ONCOLOGY | Facility: HOSPITAL | Age: 64
Discharge: HOME | End: 2019-06-27

## 2019-06-27 PROCEDURE — 77387 GUIDANCE FOR RADJ TX DLVR: CPT | Performed by: RADIOLOGY

## 2019-06-27 PROCEDURE — 77412 RADIATION TX DELIVERY LVL 3: CPT | Performed by: RADIOLOGY

## 2019-06-28 ENCOUNTER — HOSPITAL ENCOUNTER (OUTPATIENT)
Dept: RADIATION ONCOLOGY | Facility: HOSPITAL | Age: 64
Discharge: HOME | End: 2019-06-28

## 2019-06-28 PROCEDURE — 77336 RADIATION PHYSICS CONSULT: CPT | Performed by: RADIOLOGY

## 2019-06-28 PROCEDURE — 77412 RADIATION TX DELIVERY LVL 3: CPT | Performed by: RADIOLOGY

## 2019-06-28 PROCEDURE — 77387 GUIDANCE FOR RADJ TX DLVR: CPT | Performed by: RADIOLOGY

## 2019-07-01 ENCOUNTER — HOSPITAL ENCOUNTER (OUTPATIENT)
Dept: RADIATION ONCOLOGY | Facility: HOSPITAL | Age: 64
Discharge: HOME | End: 2019-07-01

## 2019-07-01 ENCOUNTER — HOSPITAL ENCOUNTER (OUTPATIENT)
Dept: RADIATION ONCOLOGY | Facility: HOSPITAL | Age: 64
Setting detail: RADIATION/ONCOLOGY SERIES
Discharge: HOME | End: 2019-07-01

## 2019-07-01 PROCEDURE — 77387 GUIDANCE FOR RADJ TX DLVR: CPT | Performed by: RADIOLOGY

## 2019-07-01 PROCEDURE — 77412 RADIATION TX DELIVERY LVL 3: CPT | Performed by: RADIOLOGY

## 2019-07-02 ENCOUNTER — HOSPITAL ENCOUNTER (OUTPATIENT)
Dept: RADIATION ONCOLOGY | Facility: HOSPITAL | Age: 64
Discharge: HOME | End: 2019-07-02

## 2019-07-02 VITALS — WEIGHT: 205.2 LBS | BODY MASS INDEX: 37.53 KG/M2

## 2019-07-02 PROCEDURE — 77387 GUIDANCE FOR RADJ TX DLVR: CPT | Performed by: RADIOLOGY

## 2019-07-02 PROCEDURE — 77412 RADIATION TX DELIVERY LVL 3: CPT | Performed by: RADIOLOGY

## 2019-07-03 ENCOUNTER — HOSPITAL ENCOUNTER (OUTPATIENT)
Dept: RADIATION ONCOLOGY | Facility: HOSPITAL | Age: 64
Discharge: HOME | End: 2019-07-03

## 2019-07-03 PROCEDURE — 77412 RADIATION TX DELIVERY LVL 3: CPT | Performed by: RADIOLOGY

## 2019-07-03 PROCEDURE — 77387 GUIDANCE FOR RADJ TX DLVR: CPT | Performed by: RADIOLOGY

## 2019-07-05 ENCOUNTER — HOSPITAL ENCOUNTER (OUTPATIENT)
Dept: RADIATION ONCOLOGY | Facility: HOSPITAL | Age: 64
Discharge: HOME | End: 2019-07-05

## 2019-07-05 PROCEDURE — 77336 RADIATION PHYSICS CONSULT: CPT | Performed by: RADIOLOGY

## 2019-07-05 PROCEDURE — 77412 RADIATION TX DELIVERY LVL 3: CPT | Performed by: RADIOLOGY

## 2019-07-05 PROCEDURE — 77387 GUIDANCE FOR RADJ TX DLVR: CPT | Performed by: RADIOLOGY

## 2019-07-08 ENCOUNTER — HOSPITAL ENCOUNTER (OUTPATIENT)
Dept: RADIATION ONCOLOGY | Facility: HOSPITAL | Age: 64
Discharge: HOME | End: 2019-07-08

## 2019-07-08 PROCEDURE — 77387 GUIDANCE FOR RADJ TX DLVR: CPT | Performed by: RADIOLOGY

## 2019-07-08 PROCEDURE — 77412 RADIATION TX DELIVERY LVL 3: CPT | Performed by: RADIOLOGY

## 2019-07-09 ENCOUNTER — HOSPITAL ENCOUNTER (OUTPATIENT)
Dept: RADIATION ONCOLOGY | Facility: HOSPITAL | Age: 64
Discharge: HOME | End: 2019-07-09

## 2019-07-09 VITALS — BODY MASS INDEX: 37.57 KG/M2 | WEIGHT: 205.4 LBS

## 2019-07-09 PROCEDURE — 77412 RADIATION TX DELIVERY LVL 3: CPT | Performed by: RADIOLOGY

## 2019-07-09 PROCEDURE — 77387 GUIDANCE FOR RADJ TX DLVR: CPT | Performed by: RADIOLOGY

## 2019-07-10 ENCOUNTER — HOSPITAL ENCOUNTER (OUTPATIENT)
Dept: RADIATION ONCOLOGY | Facility: HOSPITAL | Age: 64
Discharge: HOME | End: 2019-07-10

## 2019-07-10 PROCEDURE — 77387 GUIDANCE FOR RADJ TX DLVR: CPT | Performed by: RADIOLOGY

## 2019-07-10 PROCEDURE — 77412 RADIATION TX DELIVERY LVL 3: CPT | Performed by: RADIOLOGY

## 2019-07-11 ENCOUNTER — HOSPITAL ENCOUNTER (OUTPATIENT)
Dept: RADIATION ONCOLOGY | Facility: HOSPITAL | Age: 64
Discharge: HOME | End: 2019-07-11

## 2019-07-11 PROCEDURE — 77336 RADIATION PHYSICS CONSULT: CPT | Performed by: RADIOLOGY

## 2019-07-11 PROCEDURE — 77387 GUIDANCE FOR RADJ TX DLVR: CPT | Performed by: RADIOLOGY

## 2019-07-11 PROCEDURE — 77412 RADIATION TX DELIVERY LVL 3: CPT | Performed by: RADIOLOGY

## 2019-07-12 ENCOUNTER — HOSPITAL ENCOUNTER (OUTPATIENT)
Dept: RADIATION ONCOLOGY | Facility: HOSPITAL | Age: 64
Discharge: HOME | End: 2019-07-12

## 2019-07-12 PROCEDURE — 77412 RADIATION TX DELIVERY LVL 3: CPT | Performed by: RADIOLOGY

## 2019-07-12 PROCEDURE — 77387 GUIDANCE FOR RADJ TX DLVR: CPT | Performed by: RADIOLOGY

## 2019-07-15 ENCOUNTER — HOSPITAL ENCOUNTER (OUTPATIENT)
Dept: RADIATION ONCOLOGY | Facility: HOSPITAL | Age: 64
Discharge: HOME | End: 2019-07-15

## 2019-07-15 PROCEDURE — 77412 RADIATION TX DELIVERY LVL 3: CPT | Performed by: RADIOLOGY

## 2019-07-15 PROCEDURE — 77387 GUIDANCE FOR RADJ TX DLVR: CPT | Performed by: RADIOLOGY

## 2019-07-16 ENCOUNTER — HOSPITAL ENCOUNTER (OUTPATIENT)
Dept: RADIATION ONCOLOGY | Facility: HOSPITAL | Age: 64
Discharge: HOME | End: 2019-07-16

## 2019-07-16 VITALS — WEIGHT: 206.3 LBS | BODY MASS INDEX: 37.73 KG/M2

## 2019-07-16 PROCEDURE — 77387 GUIDANCE FOR RADJ TX DLVR: CPT | Performed by: RADIOLOGY

## 2019-07-16 PROCEDURE — 77412 RADIATION TX DELIVERY LVL 3: CPT | Performed by: RADIOLOGY

## 2019-07-17 ENCOUNTER — HOSPITAL ENCOUNTER (OUTPATIENT)
Dept: RADIATION ONCOLOGY | Facility: HOSPITAL | Age: 64
Discharge: HOME | End: 2019-07-17

## 2019-07-17 PROCEDURE — 77412 RADIATION TX DELIVERY LVL 3: CPT | Performed by: RADIOLOGY

## 2019-07-17 PROCEDURE — 77387 GUIDANCE FOR RADJ TX DLVR: CPT | Performed by: RADIOLOGY

## 2019-07-18 ENCOUNTER — HOSPITAL ENCOUNTER (OUTPATIENT)
Dept: RADIATION ONCOLOGY | Facility: HOSPITAL | Age: 64
Discharge: HOME | End: 2019-07-18

## 2019-07-18 PROCEDURE — 77412 RADIATION TX DELIVERY LVL 3: CPT | Performed by: RADIOLOGY

## 2019-07-18 PROCEDURE — 77387 GUIDANCE FOR RADJ TX DLVR: CPT | Performed by: RADIOLOGY

## 2019-07-18 PROCEDURE — 77336 RADIATION PHYSICS CONSULT: CPT | Performed by: RADIOLOGY

## 2019-07-19 ENCOUNTER — HOSPITAL ENCOUNTER (OUTPATIENT)
Dept: RADIATION ONCOLOGY | Facility: HOSPITAL | Age: 64
Discharge: HOME | End: 2019-07-19

## 2019-07-19 PROCEDURE — 77387 GUIDANCE FOR RADJ TX DLVR: CPT | Performed by: RADIOLOGY

## 2019-07-19 PROCEDURE — 77412 RADIATION TX DELIVERY LVL 3: CPT | Performed by: RADIOLOGY

## 2019-07-19 PROCEDURE — 77334 RADIATION TREATMENT AID(S): CPT | Performed by: RADIOLOGY

## 2019-07-19 PROCEDURE — 77300 RADIATION THERAPY DOSE PLAN: CPT | Performed by: RADIOLOGY

## 2019-07-22 ENCOUNTER — HOSPITAL ENCOUNTER (OUTPATIENT)
Dept: RADIATION ONCOLOGY | Facility: HOSPITAL | Age: 64
Discharge: HOME | End: 2019-07-22

## 2019-07-22 PROCEDURE — 77387 GUIDANCE FOR RADJ TX DLVR: CPT | Performed by: RADIOLOGY

## 2019-07-22 PROCEDURE — 77412 RADIATION TX DELIVERY LVL 3: CPT | Performed by: RADIOLOGY

## 2019-07-23 ENCOUNTER — HOSPITAL ENCOUNTER (OUTPATIENT)
Dept: RADIATION ONCOLOGY | Facility: HOSPITAL | Age: 64
Discharge: HOME | End: 2019-07-23

## 2019-07-23 VITALS — WEIGHT: 205.1 LBS | BODY MASS INDEX: 37.51 KG/M2

## 2019-07-23 PROCEDURE — 77387 GUIDANCE FOR RADJ TX DLVR: CPT | Performed by: RADIOLOGY

## 2019-07-23 PROCEDURE — 77412 RADIATION TX DELIVERY LVL 3: CPT | Performed by: RADIOLOGY

## 2019-07-24 ENCOUNTER — HOSPITAL ENCOUNTER (OUTPATIENT)
Dept: RADIATION ONCOLOGY | Facility: HOSPITAL | Age: 64
Discharge: HOME | End: 2019-07-24

## 2019-07-24 PROCEDURE — 77412 RADIATION TX DELIVERY LVL 3: CPT | Performed by: RADIOLOGY

## 2019-07-24 PROCEDURE — 77387 GUIDANCE FOR RADJ TX DLVR: CPT | Performed by: RADIOLOGY

## 2019-07-25 ENCOUNTER — HOSPITAL ENCOUNTER (OUTPATIENT)
Dept: RADIATION ONCOLOGY | Facility: HOSPITAL | Age: 64
Discharge: HOME | End: 2019-07-25

## 2019-07-25 PROCEDURE — 77336 RADIATION PHYSICS CONSULT: CPT | Performed by: RADIOLOGY

## 2019-07-25 PROCEDURE — 77412 RADIATION TX DELIVERY LVL 3: CPT | Performed by: RADIOLOGY

## 2019-07-26 ENCOUNTER — HOSPITAL ENCOUNTER (OUTPATIENT)
Dept: RADIATION ONCOLOGY | Facility: HOSPITAL | Age: 64
Discharge: HOME | End: 2019-07-26

## 2019-07-26 PROCEDURE — 77412 RADIATION TX DELIVERY LVL 3: CPT | Performed by: RADIOLOGY

## 2019-07-29 ENCOUNTER — HOSPITAL ENCOUNTER (OUTPATIENT)
Dept: RADIATION ONCOLOGY | Facility: HOSPITAL | Age: 64
Discharge: HOME | End: 2019-07-29

## 2019-07-29 PROCEDURE — 77412 RADIATION TX DELIVERY LVL 3: CPT | Performed by: RADIOLOGY

## 2019-07-30 ENCOUNTER — HOSPITAL ENCOUNTER (OUTPATIENT)
Dept: RADIATION ONCOLOGY | Facility: HOSPITAL | Age: 64
Discharge: HOME | End: 2019-07-30

## 2019-07-30 VITALS — BODY MASS INDEX: 37.64 KG/M2 | WEIGHT: 205.8 LBS

## 2019-07-30 PROCEDURE — 77412 RADIATION TX DELIVERY LVL 3: CPT | Performed by: RADIOLOGY

## 2019-07-31 ENCOUNTER — HOSPITAL ENCOUNTER (OUTPATIENT)
Dept: RADIATION ONCOLOGY | Facility: HOSPITAL | Age: 64
Discharge: HOME | End: 2019-07-31

## 2019-07-31 PROCEDURE — 77412 RADIATION TX DELIVERY LVL 3: CPT | Performed by: RADIOLOGY

## 2019-08-18 NOTE — RADIATION COMPLETION NOTES
COMPLETION NOTE    PATIENT:   Ava Mota  :    1955  COMPLETION DATE:   2019  DIAGNOSIS:   Malignant neoplasm of upper-outer quadrant of left breast in female, estrogen receptor negative (CMS/HCC)  Staging form: Breast, AJCC 8th Edition  - Clinical stage from 2019: Stage IB (cT1b, cN0, cM0, G3, ER: Negative, LA: Negative, HER2: Negative) - Signed by Jacy Uribe MD on 2/15/2019              Subjective      BRIEF HISTORY:  Ava Mota  is a very pleasant 63 y.o. female  who had an abnormal mammogram and a biopsy-proven poorly differentiated infiltrating ductal carcinoma in the left 3 o'clock position measuring 6 mm.  She underwent lumpectomy by Dr. PATINO on 2019.  She was found to have a grade 3, high grade 6 mm tumor with margins negative by 2 mm.  The tumor was ER/LA-negative HER-2/jakob negative and 0 of 1 lymph node was positive.  She underwent chemotherapy of Taxotere and Cytoxan for this triple negative tumor.  She then received radiotherapy in our department as follows:    TREATMENT COURSE:   -2019 the left breast received 45 grain 25 fractions with 10 MV photons  -2019 the tumor bed was boosted with an additional 10 Gy in 5 fractions with 10 and 15 MV photons.    TOLERANCE:   Ms. Mota tolerated her treatments well.  The skin went from mild erythema to moderate erythema to brisk.  She was given Silvadene lidocaine and the breast responded well.  She completed her treatments with no difficulty.      DISPOSITION:  At the completion of therapy an appointment was made for her to return on 2019 at 10:15 AM.  She knows to call if she has any problems sooner.        Beverly Roegrs MD    Dictated using dragon dictation

## 2019-08-28 ENCOUNTER — OFFICE VISIT (OUTPATIENT)
Dept: RADIATION ONCOLOGY | Facility: HOSPITAL | Age: 64
End: 2019-08-28

## 2019-08-28 ENCOUNTER — HOSPITAL ENCOUNTER (OUTPATIENT)
Dept: RADIATION ONCOLOGY | Facility: HOSPITAL | Age: 64
Setting detail: RADIATION/ONCOLOGY SERIES
Discharge: HOME | End: 2019-08-28

## 2019-08-28 VITALS
SYSTOLIC BLOOD PRESSURE: 170 MMHG | HEIGHT: 63 IN | TEMPERATURE: 97.7 F | OXYGEN SATURATION: 96 % | HEART RATE: 67 BPM | BODY MASS INDEX: 36.29 KG/M2 | RESPIRATION RATE: 16 BRPM | DIASTOLIC BLOOD PRESSURE: 83 MMHG | WEIGHT: 204.8 LBS

## 2019-08-28 DIAGNOSIS — Z85.3 HISTORY OF BREAST CANCER: Primary | ICD-10-CM

## 2019-08-28 PROCEDURE — G0463 HOSPITAL OUTPT CLINIC VISIT: HCPCS

## 2019-08-28 NOTE — PROGRESS NOTES
FOLLOW UP NOTE    PATIENT:                                                      Ava Mota  MEDICAL RECORD #:                        [id number]  :                                                          1955  COMPLETION DATE:   2019  DIAGNOSIS:     Cancer Staging  Malignant neoplasm of upper-outer quadrant of left breast in female, estrogen receptor negative (CMS/HCC)  Staging form: Breast, AJCC 8th Edition  - Clinical stage from 2019: Stage IB (cT1b, cN0, cM0, G3, ER: Negative, CA: Negative, HER2: Negative)         BRIEF HISTORY:    Ava Mota  is a very pleasant 63 y.o. female  who had an abnormal mammogram and a biopsy-proven poorly differentiated infiltrating ductal carcinoma in the left 3 o'clock position measuring 6 mm.  She underwent lumpectomy by Dr. PATINO on 2019.  She was found to have a grade 3, high grade 6 mm tumor with margins negative by 2 mm.  The tumor was ER/CA-negative HER-2/jakob negative and 0 of 1 lymph node was positive.  She underwent chemotherapy of Taxotere and Cytoxan for this triple negative tumor.  She then received radiotherapy to the left breast of 45 Keen in 25 fractions and to the tumor bed of 10 Gy in 5 fractions. Ms. Mota tolerated her treatments well.  The skin went from mild erythema to moderate erythema to brisk.  She was given Silvadene lidocaine and the breast responded well.  She completed her treatments with no difficulty.  Today she has no complaints except occasional tenderness of the left breast.       MEDICATIONS: Medication reconciliation for the patient was reviewed and confirmed in the electronic medical record.    Review of Systems   All other systems reviewed and are negative.      KPS 90%    Physical Exam   Constitutional: She is oriented to person, place, and time. She appears well-developed and well-nourished. No distress.   HENT:   Head: Normocephalic and atraumatic.   Eyes: EOM are normal. No scleral icterus.   Neck: Neck supple.  "  Cardiovascular: Normal rate and regular rhythm.   Pulmonary/Chest: Effort normal and breath sounds normal.   Lymphadenopathy:     She has no cervical adenopathy.   Neurological: She is alert and oriented to person, place, and time.   Nursing note and vitals reviewed.  The skin the left breast is slightly hyperpigmented.  There is no erythema or edema.    VITAL SIGNS:   Vitals:    08/28/19 1001   BP: 170/83   Pulse: 67   Resp: 16   Temp: 97.7 °F (36.5 °C)   TempSrc: Temporal   SpO2: 96%  Comment: RA   Weight: 92.9 kg (204 lb 12.8 oz)   Height: 160 cm (63\")   PainSc:   4   PainLoc: Breast  Comment: left brest soreness       The following portions of the patient's history were reviewed and updated as appropriate: allergies, current medications, past family history, past medical history, past social history, past surgical history and problem list.         Ava was seen today for breast cancer.    Diagnoses and all orders for this visit:    History of breast cancer         IMPRESSION: Ms. Mota has recovered well from acute side effects of radiotherapy to the left breast following lumpectomy.  RECOMMENDATIONS: She has appointments with Dr. Angel and with Dr. Braun.  We will see her back in our department as needed.  It is been a pleasure to participate in her care.  Return for PRN.    Beverly Rogers MD    Errors in dictation may reflect use of voice recognition software and not all errors in transcription may have been detected prior to signing.  "

## 2019-08-29 ENCOUNTER — APPOINTMENT (OUTPATIENT)
Dept: LAB | Facility: HOSPITAL | Age: 64
End: 2019-08-29

## 2019-08-29 ENCOUNTER — OFFICE VISIT (OUTPATIENT)
Dept: ONCOLOGY | Facility: CLINIC | Age: 64
End: 2019-08-29

## 2019-08-29 VITALS
WEIGHT: 204 LBS | TEMPERATURE: 97 F | HEART RATE: 71 BPM | HEIGHT: 63 IN | BODY MASS INDEX: 36.14 KG/M2 | SYSTOLIC BLOOD PRESSURE: 194 MMHG | DIASTOLIC BLOOD PRESSURE: 86 MMHG

## 2019-08-29 DIAGNOSIS — Z85.3 HISTORY OF BREAST CANCER: Primary | ICD-10-CM

## 2019-08-29 DIAGNOSIS — N63.0 BREAST LUMP OR MASS: ICD-10-CM

## 2019-08-29 DIAGNOSIS — Z17.1 MALIGNANT NEOPLASM OF UPPER-OUTER QUADRANT OF LEFT BREAST IN FEMALE, ESTROGEN RECEPTOR NEGATIVE (HCC): ICD-10-CM

## 2019-08-29 DIAGNOSIS — Z12.31 ENCOUNTER FOR SCREENING MAMMOGRAM FOR MALIGNANT NEOPLASM OF BREAST: ICD-10-CM

## 2019-08-29 DIAGNOSIS — C50.412 MALIGNANT NEOPLASM OF UPPER-OUTER QUADRANT OF LEFT BREAST IN FEMALE, ESTROGEN RECEPTOR NEGATIVE (HCC): ICD-10-CM

## 2019-08-29 LAB
ALBUMIN SERPL-MCNC: 4 G/DL (ref 3.5–5.2)
ALBUMIN/GLOB SERPL: 1.3 G/DL
ALP SERPL-CCNC: 115 U/L (ref 39–117)
ALT SERPL W P-5'-P-CCNC: 22 U/L (ref 1–33)
ANION GAP SERPL CALCULATED.3IONS-SCNC: 12.2 MMOL/L (ref 5–15)
AST SERPL-CCNC: 19 U/L (ref 1–32)
BASOPHILS # BLD AUTO: 0.03 10*3/MM3 (ref 0–0.2)
BASOPHILS NFR BLD AUTO: 0.7 % (ref 0–1.5)
BILIRUB SERPL-MCNC: 0.9 MG/DL (ref 0.2–1.2)
BUN BLD-MCNC: 10 MG/DL (ref 8–23)
BUN/CREAT SERPL: 11.5 (ref 7–25)
CALCIUM SPEC-SCNC: 9.1 MG/DL (ref 8.6–10.5)
CHLORIDE SERPL-SCNC: 103 MMOL/L (ref 98–107)
CO2 SERPL-SCNC: 27.8 MMOL/L (ref 22–29)
CREAT BLD-MCNC: 0.87 MG/DL (ref 0.57–1)
DEPRECATED RDW RBC AUTO: 40.1 FL (ref 37–54)
EOSINOPHIL # BLD AUTO: 0.08 10*3/MM3 (ref 0–0.4)
EOSINOPHIL NFR BLD AUTO: 1.9 % (ref 0.3–6.2)
ERYTHROCYTE [DISTWIDTH] IN BLOOD BY AUTOMATED COUNT: 13.1 % (ref 12.3–15.4)
GFR SERPL CREATININE-BSD FRML MDRD: 65 ML/MIN/1.73
GLOBULIN UR ELPH-MCNC: 3.2 GM/DL
GLUCOSE BLD-MCNC: 165 MG/DL (ref 65–99)
HCT VFR BLD AUTO: 42.7 % (ref 34–46.6)
HGB BLD-MCNC: 13.8 G/DL (ref 12–15.9)
IMM GRANULOCYTES # BLD AUTO: 0.02 10*3/MM3 (ref 0–0.05)
IMM GRANULOCYTES NFR BLD AUTO: 0.5 % (ref 0–0.5)
LYMPHOCYTES # BLD AUTO: 0.94 10*3/MM3 (ref 0.7–3.1)
LYMPHOCYTES NFR BLD AUTO: 22.8 % (ref 19.6–45.3)
MCH RBC QN AUTO: 27.4 PG (ref 26.6–33)
MCHC RBC AUTO-ENTMCNC: 32.3 G/DL (ref 31.5–35.7)
MCV RBC AUTO: 84.9 FL (ref 79–97)
MONOCYTES # BLD AUTO: 0.41 10*3/MM3 (ref 0.1–0.9)
MONOCYTES NFR BLD AUTO: 9.9 % (ref 5–12)
NEUTROPHILS # BLD AUTO: 2.65 10*3/MM3 (ref 1.7–7)
NEUTROPHILS NFR BLD AUTO: 64.2 % (ref 42.7–76)
NRBC BLD AUTO-RTO: 0 /100 WBC (ref 0–0.2)
PLATELET # BLD AUTO: 259 10*3/MM3 (ref 140–450)
PMV BLD AUTO: 9.3 FL (ref 6–12)
POTASSIUM BLD-SCNC: 4.6 MMOL/L (ref 3.5–5.2)
PROT SERPL-MCNC: 7.2 G/DL (ref 6–8.5)
RBC # BLD AUTO: 5.03 10*6/MM3 (ref 3.77–5.28)
SODIUM BLD-SCNC: 143 MMOL/L (ref 136–145)
WBC NRBC COR # BLD: 4.13 10*3/MM3 (ref 3.4–10.8)

## 2019-08-29 PROCEDURE — 80053 COMPREHEN METABOLIC PANEL: CPT | Performed by: NURSE PRACTITIONER

## 2019-08-29 PROCEDURE — 85025 COMPLETE CBC W/AUTO DIFF WBC: CPT | Performed by: NURSE PRACTITIONER

## 2019-08-29 PROCEDURE — 99214 OFFICE O/P EST MOD 30 MIN: CPT | Performed by: NURSE PRACTITIONER

## 2019-08-29 PROCEDURE — 36415 COLL VENOUS BLD VENIPUNCTURE: CPT | Performed by: NURSE PRACTITIONER

## 2019-08-29 NOTE — PROGRESS NOTES
PROBLEM LIST:     Malignant neoplasm of upper-outer quadrant of left breast in female, estrogen receptor negative (CMS/HCC)    1/15/2019 Initial Diagnosis     Malignant neoplasm of upper-outer quadrant of left breast in female, estrogen receptor negative (CMS/HCC)  Abnormal mammogram and a biopsy-proven poorly differentiated infiltrating ductal carcinoma in the left 3 o'clock position measuring 6 mm.                2/8/2019 Surgery     Surgery  Lumpectomy by Dr. PATINO on 2/8/2019.   Grade 3, high grade 6 mm tumor with margins negative by 2 mm.    Tumor was ER/[place]-negative HER-2/jakob negative and 0 of 1 lymph node was positive.                 3/5/2019 Imaging     CT and bone scan scheduled 03/05/2019         3/5/2019 -  Chemotherapy     OP BREAST TC DOCEtaxel / Cyclophosphamide  Start date 03/07/2019 pending.   Education 03/05/2019  Completed 05/09/2019 6/19/2019 - 7/31/2019 Radiation     Radiation OncologyTreatment Course:  Ava Mota received 5500 cGy in 30 fractions to left breast via External Beam Radiation - EBRT.            REASON FOR VISIT: Triple Negative Breast cancer    HISTORY OF PRESENT ILLNESS:   64 y.o.  female presents today for management of triple negative breast cancer.  She completed 4 cycles of adjuvant chemotherapy with Taxotere and Cytoxan. She completed radiation on July 31st. Her breast is tender and slightly erythematous. Her right breast is tender for the past 3 weeks. She does not feel a hard area in particular, but is having trouble with lying on the breast.       Past medical history, social history and family history was reviewed and unchanged from prior visit.    Review of Systems:    Review of Systems   Constitutional: Positive for fatigue.   HENT:  Negative.    Eyes: Negative.    Respiratory: Negative.    Cardiovascular: Negative.    Gastrointestinal: Negative.    Endocrine: Negative.    Genitourinary: Negative.     Musculoskeletal: Negative.    Skin: Negative.    Neurological:  "Negative.    Hematological: Negative.    Psychiatric/Behavioral: Negative.       A comprehensive 14 point review of systems was performed and was negative except as mentioned.      Medications:  The current medication list was reviewed in the EMR    ALLERGIES:    Allergies   Allergen Reactions   • Iodine Anaphylaxis     PT STATES SHE 'PASSED OUT ' AFTER CT CONTRAST.          Physical Exam    VITAL SIGNS:  BP (!) 194/86   Pulse 71   Temp 97 °F (36.1 °C) (Temporal)   Ht 160 cm (63\")   Wt 92.5 kg (204 lb)   BMI 36.14 kg/m²     Wt Readings from Last 3 Encounters:   08/29/19 92.5 kg (204 lb)   08/28/19 92.9 kg (204 lb 12.8 oz)   07/30/19 93.4 kg (205 lb 12.8 oz)        Performance Status: 1    General: well appearing, in no acute distress  HEENT: sclera anicteric, oropharynx clear, neck is supple  Lymphatics: no cervical, supraclavicular, or axillary adenopathy  Cardiovascular: regular rate and rhythm, no murmurs, rubs or gallops  Lungs: clear to auscultation bilaterally  Abdomen: soft, nontender, nondistended.  No palpable organomegaly  Extremities: no lower extremity edema  Skin: no rashes, lesions, bruising, or petechiae  Msk:  Shows no weakness of the large muscle groups  Psych: Mood is stable        RECENT LABS:    Lab Results   Component Value Date    HGB 11.5 (L) 05/09/2019    HCT 34.1 05/09/2019    MCV 90.2 05/09/2019     05/09/2019    WBC 7.39 05/09/2019    NEUTROABS 6.53 05/09/2019    LYMPHSABS 0.60 (L) 05/09/2019    MONOSABS 0.17 05/09/2019    EOSABS 0.00 05/09/2019    BASOSABS 0.02 05/09/2019       Lab Results   Component Value Date    GLUCOSE 168 (H) 05/09/2019    BUN 17 05/09/2019    CREATININE 0.70 05/09/2019     05/09/2019    K 4.0 05/09/2019     05/09/2019    CO2 25.0 (L) 05/09/2019    CALCIUM 9.0 05/09/2019    PROTEINTOT 6.6 05/09/2019    ALBUMIN 3.70 05/09/2019    BILITOT 1.8 (H) 05/09/2019    ALKPHOS 81 05/09/2019    AST 27 05/09/2019    ALT 33 05/09/2019       Ct Abdomen " Pelvis Without Contrast    Result Date: 3/5/2019  No evidence of metastatic disease involving the chest, abdomen or pelvis.  This study was performed with techniques to keep radiation doses as low as reasonably achievable (ALARA). Individualized dose reduction techniques using automated exposure control or adjustment of mA and/or kV according to the patient size were employed.  This report was finalized on 3/5/2019 2:45 PM by Dariel Lan M.D..    Ct Chest Without Contrast    Result Date: 3/5/2019  No evidence of metastatic disease involving the chest, abdomen or pelvis.  This study was performed with techniques to keep radiation doses as low as reasonably achievable (ALARA). Individualized dose reduction techniques using automated exposure control or adjustment of mA and/or kV according to the patient size were employed.  This report was finalized on 3/5/2019 2:45 PM by Dariel Lan M.D..    Nm Bone Scan Whole Body    Result Date: 3/5/2019  No scintigraphic evidence of metastatic disease.   This report was finalized on 3/5/2019 4:11 PM by Dariel Lan M.D..    Mri Breast Bilateral Diagnostic With & Without Contrast    Result Date: 1/8/2019  1. Biopsy-proven 0.5-0.6 cm invasive ductal carcinoma in the far posterior left 3:00 region. There is no evidence of multifocal/multicentric disease in the left breast. 2. Negative right breast MRI. 3. No MRI evidence of lymphadenopathy.  RECOMMENDATIONS: Surgical follow-up with Dr. Angel.   BI-RADS CATEGORY: 6, KNOWN BIOPSY PROVEN MALIGNANCY.  FINAL MRI RESULTS AND RECOMMENDATIONS WILL BE CALLED TO THE PATIENT BY OUR LEAD BREAST MRI TECHNOLOGIST.  This report was finalized on 1/8/2019 4:37 PM by Dr. Patti Quinonez MD.      Nm [place] Node Injection Only    Result Date: 2/8/2019  Radiopharmaceutical filtered sulfur colloid used for the injection for left breast lymphoscintigraphy and sentinel node mapping.  DICTATED:   2/8/2019 EDITED/ls :   2/8/2019     This report was finalized on 2/8/2019 4:42 PM by Dr. Fish Diaz.            Assessment/Plan  1. Stage IB triple negative ductal carcinoma of the left breast status post lumpectomy. She completed 4 cycles of Taxotere and cytoxan and recently completed radiation. She will need a mammogram in end of January 2020. We will plan to see her in 4 months. She will call Dr. Aguirre office to ensure her next appointment is scheduled.     2. Right breast pain. I will order a diagnostic mammogram to evaluate mass at 5'oclock position. She would like to transfer mammograms to [facility]. We will plan to do future mammograms at [facility] rather than .     2. Peripheral neuropathy is Grade 1-2.  We will continue to monitor.  It has improved. She has no current symptoms.     3. Chemotherapy related nausea: Resolved.     4. Chemotherapy related diarrhea: Resolved.     Follow up in 4 months        I spent a total of 25 minutes in direct patient care, greater than 18 minutes (greater than 50%) were spent in coordination of care, and counseling the patient regarding  treatment, side effects, and management of breast cancer . Answered any questions patient had with medication and plan.      PARDEEP Bae  [facility] Hematology and Oncology    Return in (Approximately): 4 months    Orders Placed This Encounter   Procedures   • Mammo Diagnostic Digital Tomosynthesis Right With CAD   • Comprehensive Metabolic Panel   • CBC & Differential       8/29/2019

## 2019-08-30 ENCOUNTER — DOCUMENTATION (OUTPATIENT)
Dept: ONCOLOGY | Facility: CLINIC | Age: 64
End: 2019-08-30

## 2019-09-13 ENCOUNTER — TRANSCRIBE ORDERS (OUTPATIENT)
Dept: MAMMOGRAPHY | Facility: HOSPITAL | Age: 64
End: 2019-09-13

## 2019-09-13 ENCOUNTER — HOSPITAL ENCOUNTER (OUTPATIENT)
Dept: ULTRASOUND IMAGING | Facility: HOSPITAL | Age: 64
Discharge: HOME | End: 2019-09-13

## 2019-09-13 ENCOUNTER — HOSPITAL ENCOUNTER (OUTPATIENT)
Dept: MAMMOGRAPHY | Facility: HOSPITAL | Age: 64
Discharge: HOME | End: 2019-09-13
Admitting: NURSE PRACTITIONER

## 2019-09-13 DIAGNOSIS — C50.412 MALIGNANT NEOPLASM OF UPPER-OUTER QUADRANT OF LEFT BREAST IN FEMALE, ESTROGEN RECEPTOR NEGATIVE (HCC): ICD-10-CM

## 2019-09-13 DIAGNOSIS — Z17.1 MALIGNANT NEOPLASM OF UPPER-OUTER QUADRANT OF LEFT BREAST IN FEMALE, ESTROGEN RECEPTOR NEGATIVE (HCC): ICD-10-CM

## 2019-09-13 DIAGNOSIS — R92.8 ABNORMAL MAMMOGRAM: Primary | ICD-10-CM

## 2019-09-13 DIAGNOSIS — N63.0 BREAST LUMP OR MASS: ICD-10-CM

## 2019-09-13 PROCEDURE — 76642 ULTRASOUND BREAST LIMITED: CPT | Performed by: RADIOLOGY

## 2019-09-13 PROCEDURE — 77065 DX MAMMO INCL CAD UNI: CPT

## 2019-09-13 PROCEDURE — G0279 TOMOSYNTHESIS, MAMMO: HCPCS | Performed by: RADIOLOGY

## 2019-09-13 PROCEDURE — 76642 ULTRASOUND BREAST LIMITED: CPT

## 2019-09-13 PROCEDURE — 77065 DX MAMMO INCL CAD UNI: CPT | Performed by: RADIOLOGY

## 2019-09-13 PROCEDURE — G0279 TOMOSYNTHESIS, MAMMO: HCPCS

## 2020-01-02 ENCOUNTER — OFFICE VISIT (OUTPATIENT)
Dept: ONCOLOGY | Facility: CLINIC | Age: 65
End: 2020-01-02

## 2020-01-02 VITALS
BODY MASS INDEX: 35.91 KG/M2 | OXYGEN SATURATION: 96 % | HEART RATE: 66 BPM | SYSTOLIC BLOOD PRESSURE: 180 MMHG | TEMPERATURE: 96.7 F | DIASTOLIC BLOOD PRESSURE: 87 MMHG | WEIGHT: 202.7 LBS

## 2020-01-02 DIAGNOSIS — C50.412 MALIGNANT NEOPLASM OF UPPER-OUTER QUADRANT OF LEFT BREAST IN FEMALE, ESTROGEN RECEPTOR NEGATIVE (HCC): Primary | ICD-10-CM

## 2020-01-02 DIAGNOSIS — Z17.1 MALIGNANT NEOPLASM OF UPPER-OUTER QUADRANT OF LEFT BREAST IN FEMALE, ESTROGEN RECEPTOR NEGATIVE (HCC): Primary | ICD-10-CM

## 2020-01-02 DIAGNOSIS — E66.01 MORBIDLY OBESE (HCC): ICD-10-CM

## 2020-01-02 PROCEDURE — 99214 OFFICE O/P EST MOD 30 MIN: CPT | Performed by: NURSE PRACTITIONER

## 2020-01-02 RX ORDER — MONTELUKAST SODIUM 10 MG/1
10 TABLET ORAL AS NEEDED
COMMUNITY
Start: 2019-10-31 | End: 2021-03-19

## 2020-02-14 ENCOUNTER — TRANSCRIBE ORDERS (OUTPATIENT)
Dept: MAMMOGRAPHY | Facility: HOSPITAL | Age: 65
End: 2020-02-14

## 2020-02-14 ENCOUNTER — HOSPITAL ENCOUNTER (OUTPATIENT)
Dept: MAMMOGRAPHY | Facility: HOSPITAL | Age: 65
Discharge: HOME | End: 2020-02-14
Admitting: NURSE PRACTITIONER

## 2020-02-14 DIAGNOSIS — R92.8 ABNORMAL MAMMOGRAM: Primary | ICD-10-CM

## 2020-02-14 DIAGNOSIS — R92.8 ABNORMAL MAMMOGRAM: ICD-10-CM

## 2020-02-14 PROCEDURE — G0279 TOMOSYNTHESIS, MAMMO: HCPCS | Performed by: RADIOLOGY

## 2020-02-14 PROCEDURE — 77065 DX MAMMO INCL CAD UNI: CPT | Performed by: RADIOLOGY

## 2020-02-14 PROCEDURE — G0279 TOMOSYNTHESIS, MAMMO: HCPCS

## 2020-02-14 PROCEDURE — 77065 DX MAMMO INCL CAD UNI: CPT

## 2020-05-01 ENCOUNTER — TELEMEDICINE (OUTPATIENT)
Dept: ONCOLOGY | Facility: CLINIC | Age: 65
End: 2020-05-01

## 2020-05-01 DIAGNOSIS — Z17.1 MALIGNANT NEOPLASM OF UPPER-OUTER QUADRANT OF LEFT BREAST IN FEMALE, ESTROGEN RECEPTOR NEGATIVE (HCC): Primary | ICD-10-CM

## 2020-05-01 DIAGNOSIS — C50.412 MALIGNANT NEOPLASM OF UPPER-OUTER QUADRANT OF LEFT BREAST IN FEMALE, ESTROGEN RECEPTOR NEGATIVE (HCC): Primary | ICD-10-CM

## 2020-05-01 PROCEDURE — 99213 OFFICE O/P EST LOW 20 MIN: CPT | Performed by: NURSE PRACTITIONER

## 2020-05-01 NOTE — PROGRESS NOTES
Video visit was attempted and Internet on patient side failed. You have chosen to receive care through a telehealth visit.  Do you consent to use a video/audio/telephone connection for your medical care today? Yes      PROBLEM LIST:     Malignant neoplasm of upper-outer quadrant of left breast in female, estrogen receptor negative (CMS/HCC)    1/15/2019 Initial Diagnosis     Malignant neoplasm of upper-outer quadrant of left breast in female, estrogen receptor negative (CMS/HCC)  Abnormal mammogram and a biopsy-proven poorly differentiated infiltrating ductal carcinoma in the left 3 o'clock position measuring 6 mm.             2/8/2019 Surgery     Surgery  Lumpectomy by Dr. PATINO on 2/8/2019.   Grade 3, high grade 6 mm tumor with margins negative by 2 mm.    Tumor was ER/[place]-negative HER-2/jakob negative and 0 of 1 lymph node was positive.              3/5/2019 Imaging     CT and bone scan scheduled 03/05/2019      3/5/2019 - 5/9/2019 Chemotherapy     OP BREAST TC DOCEtaxel / Cyclophosphamide  Start date 03/07/2019 pending.   Education 03/05/2019  Completed 05/09/2019 6/19/2019 - 7/31/2019 Radiation     Radiation OncologyTreatment Course:  Ava Mota received 5500 cGy in 30 fractions to left breast via External Beam Radiation - EBRT.      1/2/2020 Survivorship     Survivorship Care Plan completed and discussed with patient.  Copy of Survivorship Care Plan provided to patient and discussed with Mila ROBERTO Survivorship plan will be mailed to patient's address.                       REASON FOR VISIT: Triple Negative Breast cancer    HISTORY OF PRESENT ILLNESS:   65 y.o.  female presents today for management of triple negative breast cancer.  She completed 4 cycles of adjuvant chemotherapy with Taxotere and Cytoxan. She completed radiation on July 31st. Her breast continues to be tender and slightly erythematous. It is larger than her right breast. She is concerned that it is not getting any better. She has not  seen Dr. PATINO recently or since her last visit after surgery. She thinks she had an appointment, but missed it. She also reports frequent choking and vomiting with food. She also has frequent heartburn that nothing helps. She has colonoscopy scheduled for June with Dr. Ford and would like to see someone in office for EGD and evaluation of swallowing.         Past medical history, social history and family history was reviewed and unchanged from prior visit.    Review of Systems:    Review of Systems   Constitutional: Positive for fatigue.   HENT:  Negative.    Eyes: Negative.    Respiratory: Negative.    Cardiovascular: Negative.    Gastrointestinal: Negative.    Endocrine: Negative.    Genitourinary: Negative.     Musculoskeletal: Negative.    Skin: Negative.    Neurological: Negative.    Hematological: Negative.    Psychiatric/Behavioral: Negative.       A comprehensive 14 point review of systems was performed and was negative except as mentioned.      Medications:  The current medication list was reviewed in the EMR    ALLERGIES:    Allergies   Allergen Reactions   • Iodine Anaphylaxis     PT STATES SHE 'PASSED OUT ' AFTER CT CONTRAST.          Physical Exam    VITAL SIGNS:  There were no vitals taken for this visit.    Wt Readings from Last 3 Encounters:   01/02/20 91.9 kg (202 lb 11.2 oz)   08/29/19 92.5 kg (204 lb)   08/28/19 92.9 kg (204 lb 12.8 oz)        Performance Status: 1    Neuro: Alert and oriented  Psych: Mood is stable        RECENT LABS:    Lab Results   Component Value Date    HGB 13.8 08/29/2019    HCT 42.7 08/29/2019    MCV 84.9 08/29/2019     08/29/2019    WBC 4.13 08/29/2019    NEUTROABS 2.65 08/29/2019    LYMPHSABS 0.94 08/29/2019    MONOSABS 0.41 08/29/2019    EOSABS 0.08 08/29/2019    BASOSABS 0.03 08/29/2019       Lab Results   Component Value Date    GLUCOSE 165 (H) 08/29/2019    BUN 10 08/29/2019    CREATININE 0.87 08/29/2019     08/29/2019    K 4.6 08/29/2019      08/29/2019    CO2 27.8 08/29/2019    CALCIUM 9.1 08/29/2019    PROTEINTOT 7.2 08/29/2019    ALBUMIN 4.00 08/29/2019    BILITOT 0.9 08/29/2019    ALKPHOS 115 08/29/2019    AST 19 08/29/2019    ALT 22 08/29/2019       Ct Abdomen Pelvis Without Contrast    Result Date: 3/5/2019  No evidence of metastatic disease involving the chest, abdomen or pelvis.  This study was performed with techniques to keep radiation doses as low as reasonably achievable (ALARA). Individualized dose reduction techniques using automated exposure control or adjustment of mA and/or kV according to the patient size were employed.  This report was finalized on 3/5/2019 2:45 PM by Dariel Lan M.D..    Ct Chest Without Contrast    Result Date: 3/5/2019  No evidence of metastatic disease involving the chest, abdomen or pelvis.  This study was performed with techniques to keep radiation doses as low as reasonably achievable (ALARA). Individualized dose reduction techniques using automated exposure control or adjustment of mA and/or kV according to the patient size were employed.  This report was finalized on 3/5/2019 2:45 PM by Dariel Lan M.D..    Nm Bone Scan Whole Body    Result Date: 3/5/2019  No scintigraphic evidence of metastatic disease.   This report was finalized on 3/5/2019 4:11 PM by Dariel Lan M.D..    Mri Breast Bilateral Diagnostic With & Without Contrast    Result Date: 1/8/2019  1. Biopsy-proven 0.5-0.6 cm invasive ductal carcinoma in the far posterior left 3:00 region. There is no evidence of multifocal/multicentric disease in the left breast. 2. Negative right breast MRI. 3. No MRI evidence of lymphadenopathy.  RECOMMENDATIONS: Surgical follow-up with Dr. Angel.   BI-RADS CATEGORY: 6, KNOWN BIOPSY PROVEN MALIGNANCY.  FINAL MRI RESULTS AND RECOMMENDATIONS WILL BE CALLED TO THE PATIENT BY OUR LEAD BREAST MRI TECHNOLOGIST.  This report was finalized on 1/8/2019 4:37 PM by Dr. Patti Quinonez MD.      Nm  [place] Node Injection Only    Result Date: 2/8/2019  Radiopharmaceutical filtered sulfur colloid used for the injection for left breast lymphoscintigraphy and sentinel node mapping.  DICTATED:   2/8/2019 EDITED/ls :   2/8/2019    This report was finalized on 2/8/2019 4:42 PM by Dr. Fish Diaz.            Assessment/Plan  1. Stage IB triple negative ductal carcinoma of the left breast status post lumpectomy. She completed 4 cycles of Taxotere and cytoxan and completed radiation July 31,2019. I am concerned with her continued breast pain and swelling. We will repeat diagnostic mammogram. I will do an urgent referral back to Dr. Aguirre office.     2. Right breast pain. I will order a diagnostic mammogram stat to evaluate edema in breast and breast pain.  Refer to Dr. PATINO.     3. Peripheral neuropathy is Grade 1-2.  We will continue to monitor.Continues to improve. She has no current symptoms.     4. Trouble swallowing. I will refer her to Dr. Galindo to evaluate choking when eating and trouble swallowing. She has appointment with Dr. Ford in June and would like to have EGD at that time as well.     Follow up in 3 months.         PARDEEP Bae  [facility] Hematology and Oncology         Orders Placed This Encounter   Procedures   • Mammo Diagnostic Digital Tomosynthesis Left With CAD   • Ambulatory Referral to General Surgery   • Ambulatory Referral to Gastroenterology       5/1/2020

## 2020-05-06 ENCOUNTER — TELEPHONE (OUTPATIENT)
Dept: ONCOLOGY | Facility: CLINIC | Age: 65
End: 2020-05-06

## 2020-05-06 NOTE — TELEPHONE ENCOUNTER
Called patient back per mila Pelayo APRN. Informing patient that Mila wanted to repeat mammogram related to new findings.

## 2020-05-06 NOTE — TELEPHONE ENCOUNTER
Pt called and had diagnostic sara on 02/14. She just wanted to make sure that you was aware. She thought it might be too soon to have diag. Mamm tomorrow. Thanks please call pt back.

## 2020-05-06 NOTE — TELEPHONE ENCOUNTER
----- Message from PARDEEP Bae sent at 5/6/2020 11:05 AM EDT -----  Yes she needs Diagnostic mamm due to recent findings on breast exam. Can you let her know.    Thanks  Diya

## 2020-05-07 ENCOUNTER — HOSPITAL ENCOUNTER (OUTPATIENT)
Dept: MAMMOGRAPHY | Facility: HOSPITAL | Age: 65
Discharge: HOME | End: 2020-05-07

## 2020-05-07 DIAGNOSIS — Z17.1 MALIGNANT NEOPLASM OF UPPER-OUTER QUADRANT OF LEFT BREAST IN FEMALE, ESTROGEN RECEPTOR NEGATIVE (HCC): ICD-10-CM

## 2020-05-07 DIAGNOSIS — C50.412 MALIGNANT NEOPLASM OF UPPER-OUTER QUADRANT OF LEFT BREAST IN FEMALE, ESTROGEN RECEPTOR NEGATIVE (HCC): ICD-10-CM

## 2020-08-06 ENCOUNTER — TELEPHONE (OUTPATIENT)
Dept: ONCOLOGY | Facility: CLINIC | Age: 65
End: 2020-08-06

## 2020-08-06 ENCOUNTER — TRANSCRIBE ORDERS (OUTPATIENT)
Dept: ADMINISTRATIVE | Facility: HOSPITAL | Age: 65
End: 2020-08-06

## 2020-08-06 NOTE — TELEPHONE ENCOUNTER
PT NEEDS TO CANCEL HER 8/7 APPT WITH DR JARRETT.    SHE WOULD LIKE TO RESCHEDULE THE FIRST WEEK OF SEPT AROUND 10:30. (EXCEPT SEPT 1)    PLEASE GIVE PT A CALL WITH NEW APPT.    BEST CALL BACK # [phone]

## 2020-08-18 ENCOUNTER — APPOINTMENT (OUTPATIENT)
Dept: MAMMOGRAPHY | Facility: HOSPITAL | Age: 65
End: 2020-08-18

## 2020-09-18 ENCOUNTER — OFFICE VISIT (OUTPATIENT)
Dept: ONCOLOGY | Facility: CLINIC | Age: 65
End: 2020-09-18

## 2020-09-18 VITALS
RESPIRATION RATE: 16 BRPM | HEART RATE: 65 BPM | SYSTOLIC BLOOD PRESSURE: 143 MMHG | HEIGHT: 63 IN | TEMPERATURE: 97.1 F | DIASTOLIC BLOOD PRESSURE: 73 MMHG | BODY MASS INDEX: 34.91 KG/M2 | WEIGHT: 197 LBS | OXYGEN SATURATION: 98 %

## 2020-09-18 DIAGNOSIS — C50.412 MALIGNANT NEOPLASM OF UPPER-OUTER QUADRANT OF LEFT BREAST IN FEMALE, ESTROGEN RECEPTOR NEGATIVE (HCC): Primary | ICD-10-CM

## 2020-09-18 DIAGNOSIS — Z17.1 MALIGNANT NEOPLASM OF UPPER-OUTER QUADRANT OF LEFT BREAST IN FEMALE, ESTROGEN RECEPTOR NEGATIVE (HCC): Primary | ICD-10-CM

## 2020-09-18 PROCEDURE — 99213 OFFICE O/P EST LOW 20 MIN: CPT | Performed by: NURSE PRACTITIONER

## 2020-09-18 RX ORDER — ANTIARTHRITIC COMBINATION NO.2 900 MG
1 TABLET ORAL DAILY
COMMUNITY

## 2020-09-18 RX ORDER — CHOLECALCIFEROL (VITAMIN D3) 50 MCG
2000 TABLET ORAL DAILY
COMMUNITY
End: 2021-12-17

## 2020-09-18 RX ORDER — CETIRIZINE HYDROCHLORIDE 10 MG/1
TABLET ORAL DAILY
COMMUNITY
Start: 2020-08-25

## 2020-09-18 NOTE — PROGRESS NOTES
PROBLEM LIST:  Oncology/Hematology History   Malignant neoplasm of upper-outer quadrant of left breast in female, estrogen receptor negative (CMS/HCC)   1/15/2019 Initial Diagnosis    Malignant neoplasm of upper-outer quadrant of left breast in female, estrogen receptor negative (CMS/HCC)  Abnormal mammogram and a biopsy-proven poorly differentiated infiltrating ductal carcinoma in the left 3 o'clock position measuring 6 mm.            2/8/2019 Surgery    Surgery  Lumpectomy by Dr. PATINO on 2/8/2019.   Grade 3, high grade 6 mm tumor with margins negative by 2 mm.    Tumor was ER/[place]-negative HER-2/jakob negative and 0 of 1 lymph node was positive.             3/5/2019 Imaging    CT and bone scan scheduled 03/05/2019     3/5/2019 - 5/9/2019 Chemotherapy    OP BREAST TC DOCEtaxel / Cyclophosphamide  Start date 03/07/2019 pending.   Education 03/05/2019  Completed 05/09/2019 6/19/2019 - 7/31/2019 Radiation    Radiation OncologyTreatment Course:  Ava Mota received 5500 cGy in 30 fractions to left breast via External Beam Radiation - EBRT.     1/2/2020 Survivorship    Survivorship Care Plan completed and discussed with patient.  Copy of Survivorship Care Plan provided to patient and discussed with Mila ROBERTO Survivorship plan will be mailed to patient's address.                       REASON FOR VISIT: Triple Negative Breast cancer    HISTORY OF PRESENT ILLNESS:   65 y.o.  female presents today for management of triple negative breast cancer.  She completed 4 cycles of adjuvant chemotherapy with Taxotere and Cytoxan. She completed radiation on July 31st 2019. She says she feels great. She has no complaints today. She saw Dr. PATINO in May and breast exam was normal. She has MR and mammogram scheduled next month. Denies headaches, night sweats. No recent infections. No new bone pain.       Past medical history, social history and family history was reviewed and unchanged from prior visit.    Review of  "Systems:    Review of Systems   Constitutional: Positive for fatigue.   HENT:  Negative.    Eyes: Negative.    Respiratory: Negative.    Cardiovascular: Negative.    Gastrointestinal: Negative.    Endocrine: Negative.    Genitourinary: Negative.     Musculoskeletal: Negative.    Skin: Negative.    Neurological: Negative.    Hematological: Negative.    Psychiatric/Behavioral: Negative.       A comprehensive 14 point review of systems was performed and was negative except as mentioned.      Medications:  The current medication list was reviewed in the EMR    ALLERGIES:    Allergies   Allergen Reactions   • Iodine Anaphylaxis     PT STATES SHE 'PASSED OUT ' AFTER CT CONTRAST.          Physical Exam    VITAL SIGNS:  /73 Comment: Right Wrist  Pulse 65   Temp 97.1 °F (36.2 °C) (Temporal)   Resp 16   Ht 160 cm (63\")   Wt 89.4 kg (197 lb)   SpO2 98% Comment: RA  BMI 34.90 kg/m²     Wt Readings from Last 3 Encounters:   09/18/20 89.4 kg (197 lb)   01/02/20 91.9 kg (202 lb 11.2 oz)   08/29/19 92.5 kg (204 lb)        Performance Status: 1    Neuro: Alert and oriented  Psych: Mood is stable        RECENT LABS:    Lab Results   Component Value Date    HGB 13.8 08/29/2019    HCT 42.7 08/29/2019    MCV 84.9 08/29/2019     08/29/2019    WBC 4.13 08/29/2019    NEUTROABS 2.65 08/29/2019    LYMPHSABS 0.94 08/29/2019    MONOSABS 0.41 08/29/2019    EOSABS 0.08 08/29/2019    BASOSABS 0.03 08/29/2019       Lab Results   Component Value Date    GLUCOSE 165 (H) 08/29/2019    BUN 10 08/29/2019    CREATININE 0.87 08/29/2019     08/29/2019    K 4.6 08/29/2019     08/29/2019    CO2 27.8 08/29/2019    CALCIUM 9.1 08/29/2019    PROTEINTOT 7.2 08/29/2019    ALBUMIN 4.00 08/29/2019    BILITOT 0.9 08/29/2019    ALKPHOS 115 08/29/2019    AST 19 08/29/2019    ALT 22 08/29/2019       Ct Abdomen Pelvis Without Contrast    Result Date: 3/5/2019  No evidence of metastatic disease involving the chest, abdomen or pelvis.  " This study was performed with techniques to keep radiation doses as low as reasonably achievable (ALARA). Individualized dose reduction techniques using automated exposure control or adjustment of mA and/or kV according to the patient size were employed.  This report was finalized on 3/5/2019 2:45 PM by Dariel Lan M.D..    Ct Chest Without Contrast    Result Date: 3/5/2019  No evidence of metastatic disease involving the chest, abdomen or pelvis.  This study was performed with techniques to keep radiation doses as low as reasonably achievable (ALARA). Individualized dose reduction techniques using automated exposure control or adjustment of mA and/or kV according to the patient size were employed.  This report was finalized on 3/5/2019 2:45 PM by Dariel Lan M.D..    Nm Bone Scan Whole Body    Result Date: 3/5/2019  No scintigraphic evidence of metastatic disease.   This report was finalized on 3/5/2019 4:11 PM by Dariel Lan M.D..    Mri Breast Bilateral Diagnostic With & Without Contrast    Result Date: 1/8/2019  1. Biopsy-proven 0.5-0.6 cm invasive ductal carcinoma in the far posterior left 3:00 region. There is no evidence of multifocal/multicentric disease in the left breast. 2. Negative right breast MRI. 3. No MRI evidence of lymphadenopathy.  RECOMMENDATIONS: Surgical follow-up with Dr. Angel.   BI-RADS CATEGORY: 6, KNOWN BIOPSY PROVEN MALIGNANCY.  FINAL MRI RESULTS AND RECOMMENDATIONS WILL BE CALLED TO THE PATIENT BY OUR LEAD BREAST MRI TECHNOLOGIST.  This report was finalized on 1/8/2019 4:37 PM by Dr. Patti Quinonez MD.      Nm [place] Node Injection Only    Result Date: 2/8/2019  Radiopharmaceutical filtered sulfur colloid used for the injection for left breast lymphoscintigraphy and sentinel node mapping.  DICTATED:   2/8/2019 EDITED/ls :   2/8/2019    This report was finalized on 2/8/2019 4:42 PM by Dr. Fish Diaz.            Assessment/Plan  1. Stage IB triple negative  ductal carcinoma of the left breast status post lumpectomy. She completed 4 cycles of Taxotere and cytoxan and completed radiation July 31,2019.  She will have mammogram and MRI as scheduled in October. We will follow up with any abnormal results. She will continue to follow up with PCP for lab monitoring.     2. Right breast pain.Resolved.     3. Peripheral neuropathy is Grade 1-2.  Improved.     4. Trouble swallowing. Resolved.      Follow up in 6 months.         PARDEEP Bae  [facility] Hematology and Oncology    Return in (Approximately): 6 months    No orders of the defined types were placed in this encounter.      9/18/2020

## 2020-10-09 ENCOUNTER — HOSPITAL ENCOUNTER (OUTPATIENT)
Dept: MAMMOGRAPHY | Facility: HOSPITAL | Age: 65
Discharge: HOME | End: 2020-10-09

## 2020-10-09 ENCOUNTER — HOSPITAL ENCOUNTER (OUTPATIENT)
Dept: MRI IMAGING | Facility: HOSPITAL | Age: 65
Discharge: HOME | End: 2020-10-09

## 2020-10-09 DIAGNOSIS — C50.812 MALIGNANT NEOPLASM OF OVERLAPPING SITES OF LEFT FEMALE BREAST (HCC): ICD-10-CM

## 2020-10-09 DIAGNOSIS — R92.8 ABNORMAL MAMMOGRAM: ICD-10-CM

## 2020-10-09 LAB — CREAT BLDA-MCNC: 0.8 MG/DL (ref 0.6–1.3)

## 2020-10-09 PROCEDURE — 82565 ASSAY OF CREATININE: CPT

## 2020-10-09 PROCEDURE — 77066 DX MAMMO INCL CAD BI: CPT

## 2020-10-09 PROCEDURE — 0 GADOBENATE DIMEGLUMINE 529 MG/ML SOLUTION: Performed by: SURGERY

## 2020-10-09 PROCEDURE — C8908 MRI W/O FOL W/CONT, BREAST,: HCPCS

## 2020-10-09 PROCEDURE — G0279 TOMOSYNTHESIS, MAMMO: HCPCS | Performed by: RADIOLOGY

## 2020-10-09 PROCEDURE — A9577 INJ MULTIHANCE: HCPCS | Performed by: SURGERY

## 2020-10-09 PROCEDURE — G0279 TOMOSYNTHESIS, MAMMO: HCPCS

## 2020-10-09 PROCEDURE — 77049 MRI BREAST C-+ W/CAD BI: CPT | Performed by: RADIOLOGY

## 2020-10-09 PROCEDURE — 77066 DX MAMMO INCL CAD BI: CPT | Performed by: RADIOLOGY

## 2020-10-09 PROCEDURE — C8937 CAD BREAST MRI: HCPCS

## 2020-10-09 RX ADMIN — GADOBENATE DIMEGLUMINE 19 ML: 529 INJECTION, SOLUTION INTRAVENOUS at 12:38

## 2020-10-14 ENCOUNTER — TELEPHONE (OUTPATIENT)
Dept: MRI IMAGING | Facility: HOSPITAL | Age: 65
End: 2020-10-14

## 2021-02-26 ENCOUNTER — TRANSCRIBE ORDERS (OUTPATIENT)
Dept: ADMINISTRATIVE | Facility: HOSPITAL | Age: 66
End: 2021-02-26

## 2021-02-26 DIAGNOSIS — Z12.31 VISIT FOR SCREENING MAMMOGRAM: Primary | ICD-10-CM

## 2021-03-01 ENCOUNTER — TRANSCRIBE ORDERS (OUTPATIENT)
Dept: ADMINISTRATIVE | Facility: HOSPITAL | Age: 66
End: 2021-03-01

## 2021-03-01 DIAGNOSIS — R92.8 ABNORMAL MAMMOGRAM: Primary | ICD-10-CM

## 2021-03-18 DIAGNOSIS — Z17.1 MALIGNANT NEOPLASM OF UPPER-OUTER QUADRANT OF LEFT BREAST IN FEMALE, ESTROGEN RECEPTOR NEGATIVE (HCC): Primary | ICD-10-CM

## 2021-03-18 DIAGNOSIS — C50.412 MALIGNANT NEOPLASM OF UPPER-OUTER QUADRANT OF LEFT BREAST IN FEMALE, ESTROGEN RECEPTOR NEGATIVE (HCC): Primary | ICD-10-CM

## 2021-03-19 ENCOUNTER — OFFICE VISIT (OUTPATIENT)
Dept: ONCOLOGY | Facility: CLINIC | Age: 66
End: 2021-03-19

## 2021-03-19 ENCOUNTER — LAB (OUTPATIENT)
Dept: LAB | Facility: HOSPITAL | Age: 66
End: 2021-03-19

## 2021-03-19 VITALS
DIASTOLIC BLOOD PRESSURE: 75 MMHG | TEMPERATURE: 97.7 F | HEIGHT: 63 IN | SYSTOLIC BLOOD PRESSURE: 166 MMHG | RESPIRATION RATE: 12 BRPM | OXYGEN SATURATION: 98 % | WEIGHT: 190 LBS | BODY MASS INDEX: 33.66 KG/M2 | HEART RATE: 70 BPM

## 2021-03-19 DIAGNOSIS — C50.412 MALIGNANT NEOPLASM OF UPPER-OUTER QUADRANT OF LEFT BREAST IN FEMALE, ESTROGEN RECEPTOR NEGATIVE (HCC): ICD-10-CM

## 2021-03-19 DIAGNOSIS — R22.9 MULTIPLE SKIN NODULES: Primary | ICD-10-CM

## 2021-03-19 DIAGNOSIS — Z17.1 MALIGNANT NEOPLASM OF UPPER-OUTER QUADRANT OF LEFT BREAST IN FEMALE, ESTROGEN RECEPTOR NEGATIVE (HCC): ICD-10-CM

## 2021-03-19 DIAGNOSIS — Z79.4 TYPE 2 DIABETES MELLITUS WITH OTHER SPECIFIED COMPLICATION, WITH LONG-TERM CURRENT USE OF INSULIN (HCC): ICD-10-CM

## 2021-03-19 DIAGNOSIS — E11.69 TYPE 2 DIABETES MELLITUS WITH OTHER SPECIFIED COMPLICATION, WITH LONG-TERM CURRENT USE OF INSULIN (HCC): ICD-10-CM

## 2021-03-19 DIAGNOSIS — E66.01 MORBIDLY OBESE (HCC): ICD-10-CM

## 2021-03-19 PROBLEM — E11.9 DIABETES MELLITUS: Status: ACTIVE | Noted: 2021-03-19

## 2021-03-19 LAB
ALBUMIN SERPL-MCNC: 4.5 G/DL (ref 3.5–5.2)
ALBUMIN/GLOB SERPL: 1.5 G/DL
ALP SERPL-CCNC: 125 U/L (ref 39–117)
ALT SERPL W P-5'-P-CCNC: 17 U/L (ref 1–33)
ANION GAP SERPL CALCULATED.3IONS-SCNC: 7.3 MMOL/L (ref 5–15)
AST SERPL-CCNC: 14 U/L (ref 1–32)
BASOPHILS # BLD AUTO: 0.04 10*3/MM3 (ref 0–0.2)
BASOPHILS NFR BLD AUTO: 0.8 % (ref 0–1.5)
BILIRUB SERPL-MCNC: 1.6 MG/DL (ref 0–1.2)
BUN SERPL-MCNC: 17 MG/DL (ref 8–23)
BUN/CREAT SERPL: 21.8 (ref 7–25)
CALCIUM SPEC-SCNC: 9.5 MG/DL (ref 8.6–10.5)
CHLORIDE SERPL-SCNC: 103 MMOL/L (ref 98–107)
CO2 SERPL-SCNC: 28.7 MMOL/L (ref 22–29)
CREAT SERPL-MCNC: 0.78 MG/DL (ref 0.57–1)
DEPRECATED RDW RBC AUTO: 42.3 FL (ref 37–54)
EOSINOPHIL # BLD AUTO: 0.09 10*3/MM3 (ref 0–0.4)
EOSINOPHIL NFR BLD AUTO: 1.8 % (ref 0.3–6.2)
ERYTHROCYTE [DISTWIDTH] IN BLOOD BY AUTOMATED COUNT: 12.9 % (ref 12.3–15.4)
GFR SERPL CREATININE-BSD FRML MDRD: 73 ML/MIN/1.73
GLOBULIN UR ELPH-MCNC: 3.1 GM/DL
GLUCOSE SERPL-MCNC: 181 MG/DL (ref 65–99)
HCT VFR BLD AUTO: 45.5 % (ref 34–46.6)
HGB BLD-MCNC: 14.9 G/DL (ref 12–15.9)
IMM GRANULOCYTES # BLD AUTO: 0.02 10*3/MM3 (ref 0–0.05)
IMM GRANULOCYTES NFR BLD AUTO: 0.4 % (ref 0–0.5)
LYMPHOCYTES # BLD AUTO: 1.43 10*3/MM3 (ref 0.7–3.1)
LYMPHOCYTES NFR BLD AUTO: 27.9 % (ref 19.6–45.3)
MCH RBC QN AUTO: 29 PG (ref 26.6–33)
MCHC RBC AUTO-ENTMCNC: 32.7 G/DL (ref 31.5–35.7)
MCV RBC AUTO: 88.5 FL (ref 79–97)
MONOCYTES # BLD AUTO: 0.4 10*3/MM3 (ref 0.1–0.9)
MONOCYTES NFR BLD AUTO: 7.8 % (ref 5–12)
NEUTROPHILS NFR BLD AUTO: 3.15 10*3/MM3 (ref 1.7–7)
NEUTROPHILS NFR BLD AUTO: 61.3 % (ref 42.7–76)
NRBC BLD AUTO-RTO: 0 /100 WBC (ref 0–0.2)
PLATELET # BLD AUTO: 285 10*3/MM3 (ref 140–450)
PMV BLD AUTO: 9.8 FL (ref 6–12)
POTASSIUM SERPL-SCNC: 4.5 MMOL/L (ref 3.5–5.2)
PROT SERPL-MCNC: 7.6 G/DL (ref 6–8.5)
RBC # BLD AUTO: 5.14 10*6/MM3 (ref 3.77–5.28)
SODIUM SERPL-SCNC: 139 MMOL/L (ref 136–145)
WBC # BLD AUTO: 5.13 10*3/MM3 (ref 3.4–10.8)

## 2021-03-19 PROCEDURE — 36415 COLL VENOUS BLD VENIPUNCTURE: CPT | Performed by: NURSE PRACTITIONER

## 2021-03-19 PROCEDURE — 99214 OFFICE O/P EST MOD 30 MIN: CPT | Performed by: NURSE PRACTITIONER

## 2021-03-19 PROCEDURE — 85025 COMPLETE CBC W/AUTO DIFF WBC: CPT | Performed by: NURSE PRACTITIONER

## 2021-03-19 PROCEDURE — 80053 COMPREHEN METABOLIC PANEL: CPT | Performed by: NURSE PRACTITIONER

## 2021-03-19 RX ORDER — INSULIN ASPART 100 [IU]/ML
INJECTION, SUSPENSION SUBCUTANEOUS
COMMUNITY
Start: 2021-01-22 | End: 2023-01-20

## 2021-03-19 RX ORDER — HYDROXYZINE PAMOATE 25 MG/1
25 CAPSULE ORAL
COMMUNITY
Start: 2021-01-22

## 2021-03-19 NOTE — PROGRESS NOTES
PROBLEM LIST:  Oncology/Hematology History   Malignant neoplasm of upper-outer quadrant of left breast in female, estrogen receptor negative (CMS/HCC)   1/15/2019 Initial Diagnosis    Malignant neoplasm of upper-outer quadrant of left breast in female, estrogen receptor negative (CMS/HCC)  Abnormal mammogram and a biopsy-proven poorly differentiated infiltrating ductal carcinoma in the left 3 o'clock position measuring 6 mm.            2/8/2019 Surgery    Surgery  Lumpectomy by Dr. PATINO on 2/8/2019.   Grade 3, high grade 6 mm tumor with margins negative by 2 mm.    Tumor was ER/[place]-negative HER-2/jakob negative and 0 of 1 lymph node was positive.             3/5/2019 Imaging    CT and bone scan scheduled 03/05/2019     3/5/2019 - 5/9/2019 Chemotherapy    OP BREAST TC DOCEtaxel / Cyclophosphamide  Start date 03/07/2019 pending.   Education 03/05/2019  Completed 05/09/2019 6/19/2019 - 7/31/2019 Radiation    Radiation OncologyTreatment Course:  Ava Mota received 5500 cGy in 30 fractions to left breast via External Beam Radiation - EBRT.     1/2/2020 Survivorship    Survivorship Care Plan completed and discussed with patient.  Copy of Survivorship Care Plan provided to patient and discussed with Mila ROBERTO Survivorship plan will be mailed to patient's address.                       REASON FOR VISIT: Triple Negative Breast cancer    HISTORY OF PRESENT ILLNESS:   66 y.o.  female presents today for management of triple negative breast cancer.  She completed 4 cycles of adjuvant chemotherapy with Taxotere and Cytoxan. She completed radiation on July 31st 2019. She says she feels great.  She has lost 37 pounds deliberately.  Since losing weight she has noted multiple left-sided nodules.  She has mammogram scheduled in May.  She had Covid and has recovered well. She had her first Covid vaccine and tolerated well overall.  Denies headaches, night sweats. No recent infections. No new bone pain.       Past medical  "history, social history and family history was reviewed and unchanged from prior visit.    Review of Systems:    Review of Systems   Constitutional: Positive for fatigue.   HENT:  Negative.    Eyes: Negative.    Respiratory: Negative.    Cardiovascular: Negative.    Gastrointestinal: Negative.    Endocrine: Negative.    Genitourinary: Negative.     Musculoskeletal: Negative.    Skin: Negative.    Neurological: Negative.    Hematological: Negative.    Psychiatric/Behavioral: Negative.       A comprehensive 14 point review of systems was performed and was negative except as mentioned.      Medications:  The current medication list was reviewed in the EMR    ALLERGIES:    Allergies   Allergen Reactions   • Iodine Anaphylaxis     PT STATES SHE 'PASSED OUT ' AFTER CT CONTRAST.          Physical Exam    VITAL SIGNS:  /75   Pulse 70   Temp 97.7 °F (36.5 °C) (Tympanic)   Resp 12   Ht 160 cm (63\")   Wt 86.2 kg (190 lb)   SpO2 98%   BMI 33.66 kg/m²     Wt Readings from Last 3 Encounters:   03/19/21 86.2 kg (190 lb)   09/18/20 89.4 kg (197 lb)   01/02/20 91.9 kg (202 lb 11.2 oz)        General: well appearing female in no acute distress  Neuro: alert and oriented  HEENT: sclera anicteric, oropharynx clear  Lymphatics: no cervical, supraclavicular, or axillary adenopathy  Cardiovascular: regular rate and rhythm, no murmurs  Lungs: clear to auscultation bilaterally  Abdomen: soft, nontender, nondistended.  No palpable organomegaly  Extremeties: no lower extremity edema  Skin: no rashes, lesions, bruising, or petechiae  Psych: mood and affect appropriate        RECENT LABS:    Lab Results   Component Value Date    HGB 13.8 08/29/2019    HCT 42.7 08/29/2019    MCV 84.9 08/29/2019     08/29/2019    WBC 4.13 08/29/2019    NEUTROABS 2.65 08/29/2019    LYMPHSABS 0.94 08/29/2019    MONOSABS 0.41 08/29/2019    EOSABS 0.08 08/29/2019    BASOSABS 0.03 08/29/2019       Lab Results   Component Value Date    GLUCOSE 165 " (H) 08/29/2019    BUN 10 08/29/2019    CREATININE 0.80 10/09/2020     08/29/2019    K 4.6 08/29/2019     08/29/2019    CO2 27.8 08/29/2019    CALCIUM 9.1 08/29/2019    PROTEINTOT 7.2 08/29/2019    ALBUMIN 4.00 08/29/2019    BILITOT 0.9 08/29/2019    ALKPHOS 115 08/29/2019    AST 19 08/29/2019    ALT 22 08/29/2019       Ct Abdomen Pelvis Without Contrast    Result Date: 3/5/2019  No evidence of metastatic disease involving the chest, abdomen or pelvis.  This study was performed with techniques to keep radiation doses as low as reasonably achievable (ALARA). Individualized dose reduction techniques using automated exposure control or adjustment of mA and/or kV according to the patient size were employed.  This report was finalized on 3/5/2019 2:45 PM by Dariel Lan M.D..    Ct Chest Without Contrast    Result Date: 3/5/2019  No evidence of metastatic disease involving the chest, abdomen or pelvis.  This study was performed with techniques to keep radiation doses as low as reasonably achievable (ALARA). Individualized dose reduction techniques using automated exposure control or adjustment of mA and/or kV according to the patient size were employed.  This report was finalized on 3/5/2019 2:45 PM by Dariel Lan M.D..    Nm Bone Scan Whole Body    Result Date: 3/5/2019  No scintigraphic evidence of metastatic disease.   This report was finalized on 3/5/2019 4:11 PM by Dariel Lan M.D..    Mri Breast Bilateral Diagnostic With & Without Contrast    Result Date: 1/8/2019  1. Biopsy-proven 0.5-0.6 cm invasive ductal carcinoma in the far posterior left 3:00 region. There is no evidence of multifocal/multicentric disease in the left breast. 2. Negative right breast MRI. 3. No MRI evidence of lymphadenopathy.  RECOMMENDATIONS: Surgical follow-up with Dr. Angel.   BI-RADS CATEGORY: 6, KNOWN BIOPSY PROVEN MALIGNANCY.  FINAL MRI RESULTS AND RECOMMENDATIONS WILL BE CALLED TO THE PATIENT BY OUR  LEAD BREAST MRI TECHNOLOGIST.  This report was finalized on 1/8/2019 4:37 PM by Dr. Patti Quinonez MD.      Nm [place] Node Injection Only    Result Date: 2/8/2019  Radiopharmaceutical filtered sulfur colloid used for the injection for left breast lymphoscintigraphy and sentinel node mapping.  DICTATED:   2/8/2019 EDITED/ls :   2/8/2019    This report was finalized on 2/8/2019 4:42 PM by Dr. Fish Diaz.            Assessment/Plan  1. Stage IB triple negative ductal carcinoma of the left breast status post lumpectomy. She completed 4 cycles of Taxotere and cytoxan and completed radiation July 31,2019.  She will have mammogram in May. MRI will be due in October. We will follow up with any abnormal results.  She has been unable to get into see her PCP recently.  We will check a CBC and a CMP today and I will call her with results.    2. Diabetes.  Continue Metformin and Avapro as prescribed by PCP.  She continues to use insulin.  She will follow up with primary care as scheduled.    3. Peripheral neuropathy is Grade 1-2.  Stable.      4. Obesity.  She has deliberately lost 37 pounds since we last saw her.  She will continue to eat a healthy diet and exercise as tolerated.     5. Skin nodules. We will check a Ct scan with liver protocol for further evaluation. Patient has reported anaphylactic reaction to IV contrast. We will proceed with oral contrast. Discussed with patient. patient is agreeable to plan.     Follow up in 6 months.         PARDEEP Bae  [facility]    Return in (Approximately): 6 months    Orders Placed This Encounter   Procedures   • CT Abdomen Pelvis With & Without Contrast       3/19/2021

## 2021-03-24 ENCOUNTER — HOSPITAL ENCOUNTER (OUTPATIENT)
Dept: CT IMAGING | Facility: HOSPITAL | Age: 66
Discharge: HOME | End: 2021-03-24
Admitting: NURSE PRACTITIONER

## 2021-03-24 DIAGNOSIS — C50.412 MALIGNANT NEOPLASM OF UPPER-OUTER QUADRANT OF LEFT BREAST IN FEMALE, ESTROGEN RECEPTOR NEGATIVE (HCC): ICD-10-CM

## 2021-03-24 DIAGNOSIS — R22.9 MULTIPLE SKIN NODULES: ICD-10-CM

## 2021-03-24 DIAGNOSIS — Z17.1 MALIGNANT NEOPLASM OF UPPER-OUTER QUADRANT OF LEFT BREAST IN FEMALE, ESTROGEN RECEPTOR NEGATIVE (HCC): ICD-10-CM

## 2021-03-24 PROCEDURE — 74176 CT ABD & PELVIS W/O CONTRAST: CPT

## 2021-03-25 ENCOUNTER — TELEPHONE (OUTPATIENT)
Dept: ONCOLOGY | Facility: CLINIC | Age: 66
End: 2021-03-25

## 2021-03-25 ENCOUNTER — APPOINTMENT (OUTPATIENT)
Dept: MAMMOGRAPHY | Facility: HOSPITAL | Age: 66
End: 2021-03-25

## 2021-03-25 NOTE — TELEPHONE ENCOUNTER
Discussed with patient that Ct scan did not show nodules from exam. We will continue to monitor for now. If nodules get bigger or change we may need to consider a biopsy. Patient verbalized understanding.

## 2021-05-11 ENCOUNTER — HOSPITAL ENCOUNTER (OUTPATIENT)
Dept: MAMMOGRAPHY | Facility: HOSPITAL | Age: 66
Discharge: HOME | End: 2021-05-11
Admitting: NURSE PRACTITIONER

## 2021-05-11 DIAGNOSIS — R92.8 ABNORMAL MAMMOGRAM: ICD-10-CM

## 2021-05-11 PROCEDURE — 77065 DX MAMMO INCL CAD UNI: CPT | Performed by: RADIOLOGY

## 2021-05-11 PROCEDURE — 77065 DX MAMMO INCL CAD UNI: CPT

## 2021-10-15 ENCOUNTER — TELEPHONE (OUTPATIENT)
Dept: ONCOLOGY | Facility: CLINIC | Age: 66
End: 2021-10-15

## 2021-10-15 NOTE — TELEPHONE ENCOUNTER
Caller: Ava Mota    Relationship to patient: Self    Best call back number: [phone]    Chief complaint: CANC./BASSAM.    Type of visit: FOLLOW UP 1    If rescheduling, when is the original appointment: 10/15/2021    Additional notes: RADHA WT ME TO  [facility] OFFICE FOR SAME DAY CANC./BASSAM. BUT NO CONNECTION WAS MADE.

## 2021-11-03 ENCOUNTER — TELEPHONE (OUTPATIENT)
Dept: ONCOLOGY | Facility: CLINIC | Age: 66
End: 2021-11-03

## 2021-11-03 NOTE — TELEPHONE ENCOUNTER
Caller: Ava Mota    Relationship to patient: Self    Best call back number: [phone]    Chief complaint: PT WOULD LIKE TO R/S HER APPT FOR Friday 11/5.    Type of visit: F/U    Requested date: ANY Tuesday OR Wednesday WOULD WORK BEST     If rescheduling, when is the originaL appointment: 11/5    Additional notes: PT WOULD LIKE TO R/S F/U FOR 11/5. ANY Tuesday OR Wednesday WOULD WORK BEST FOR HER. PLEASE CALL PT TO ADVISE. TRIED TO W/T BUT GOT ABEL'S VOICEMAIL.

## 2021-12-17 ENCOUNTER — OFFICE VISIT (OUTPATIENT)
Dept: ONCOLOGY | Facility: CLINIC | Age: 66
End: 2021-12-17

## 2021-12-17 VITALS
BODY MASS INDEX: 34.73 KG/M2 | WEIGHT: 196 LBS | DIASTOLIC BLOOD PRESSURE: 84 MMHG | RESPIRATION RATE: 16 BRPM | TEMPERATURE: 97.3 F | SYSTOLIC BLOOD PRESSURE: 185 MMHG | OXYGEN SATURATION: 98 % | HEART RATE: 71 BPM | HEIGHT: 63 IN

## 2021-12-17 DIAGNOSIS — Z17.1 MALIGNANT NEOPLASM OF UPPER-OUTER QUADRANT OF LEFT BREAST IN FEMALE, ESTROGEN RECEPTOR NEGATIVE (HCC): Primary | ICD-10-CM

## 2021-12-17 DIAGNOSIS — C50.412 MALIGNANT NEOPLASM OF UPPER-OUTER QUADRANT OF LEFT BREAST IN FEMALE, ESTROGEN RECEPTOR NEGATIVE (HCC): Primary | ICD-10-CM

## 2021-12-17 PROCEDURE — 99214 OFFICE O/P EST MOD 30 MIN: CPT | Performed by: NURSE PRACTITIONER

## 2021-12-17 RX ORDER — VALSARTAN 320 MG/1
320 TABLET ORAL DAILY
COMMUNITY
Start: 2021-09-28

## 2021-12-17 RX ORDER — FUROSEMIDE 20 MG/1
TABLET ORAL
COMMUNITY
End: 2023-01-20

## 2021-12-17 RX ORDER — HYDROCHLOROTHIAZIDE 12.5 MG/1
12.5 TABLET ORAL DAILY
COMMUNITY
Start: 2021-09-28 | End: 2023-01-20

## 2021-12-17 NOTE — PROGRESS NOTES
PROBLEM LIST:  Oncology/Hematology History   Malignant neoplasm of upper-outer quadrant of left breast in female, estrogen receptor negative (HCC)   1/15/2019 Initial Diagnosis    Malignant neoplasm of upper-outer quadrant of left breast in female, estrogen receptor negative (CMS/HCC)  Abnormal mammogram and a biopsy-proven poorly differentiated infiltrating ductal carcinoma in the left 3 o'clock position measuring 6 mm.            2/8/2019 Surgery    Surgery  Lumpectomy by Dr. PATINO on 2/8/2019.   Grade 3, high grade 6 mm tumor with margins negative by 2 mm.    Tumor was ER/[place]-negative HER-2/jakob negative and 0 of 1 lymph node was positive.             3/5/2019 Imaging    CT and bone scan scheduled 03/05/2019     3/5/2019 - 5/9/2019 Chemotherapy    OP BREAST TC DOCEtaxel / Cyclophosphamide  Start date 03/07/2019 pending.   Education 03/05/2019  Completed 05/09/2019 6/19/2019 - 7/31/2019 Radiation    Radiation OncologyTreatment Course:  Ava Mota received 5500 cGy in 30 fractions to left breast via External Beam Radiation - EBRT.     1/2/2020 Survivorship    Survivorship Care Plan completed and discussed with patient.  Copy of Survivorship Care Plan provided to patient and discussed with Mila ROBERTO Survivorship plan will be mailed to patient's address.                       REASON FOR VISIT: Triple Negative Breast cancer    HISTORY OF PRESENT ILLNESS:   66 y.o.  female presents today for management of triple negative breast cancer.  She completed 4 cycles of adjuvant chemotherapy with Taxotere and Cytoxan. She completed radiation on July 31st 2019. She says she feels great.  She has gained about 6lbs back from last visit. She has been staying very busy.   Denies headaches, night sweats. No recent infections. No new bone pain. She was sick in October and was unable to have mammogram done. She says it lasted about 6 weeks and was unable to do any scheduled scans.       Past medical history, social  "history and family history was reviewed and unchanged from prior visit.    Review of Systems:    Review of Systems   Constitutional: Positive for fatigue.   HENT:  Negative.    Eyes: Negative.    Respiratory: Negative.    Cardiovascular: Negative.    Gastrointestinal: Negative.    Endocrine: Negative.    Genitourinary: Negative.     Musculoskeletal: Negative.    Skin: Negative.    Neurological: Negative.    Hematological: Negative.    Psychiatric/Behavioral: Negative.       A comprehensive 14 point review of systems was performed and was negative except as mentioned.      Medications:  The current medication list was reviewed in the EMR    ALLERGIES:    Allergies   Allergen Reactions   • Iodine Anaphylaxis     PT STATES SHE 'PASSED OUT ' AFTER CT CONTRAST.    • Dapagliflozin Unknown - Low Severity     severe yeast infection         Physical Exam    VITAL SIGNS:  BP (!) 185/84   Pulse 71   Temp 97.3 °F (36.3 °C) (Temporal)   Resp 16   Ht 160 cm (63\")   Wt 88.9 kg (196 lb)   SpO2 98%   BMI 34.72 kg/m²     Wt Readings from Last 3 Encounters:   12/17/21 88.9 kg (196 lb)   03/19/21 86.2 kg (190 lb)   09/18/20 89.4 kg (197 lb)        General: well appearing female in no acute distress  Neuro: alert and oriented  HEENT: sclera anicteric, oropharynx clear  Lymphatics: no cervical, supraclavicular, or axillary adenopathy  Cardiovascular: regular rate and rhythm, no murmurs  Lungs: clear to auscultation bilaterally  Abdomen: soft, nontender, nondistended.  No palpable organomegaly  Extremeties: no lower extremity edema  Skin: no rashes, lesions, bruising, or petechiae  Psych: mood and affect appropriate        RECENT LABS:    Lab Results   Component Value Date    HGB 14.9 03/19/2021    HCT 45.5 03/19/2021    MCV 88.5 03/19/2021     03/19/2021    WBC 5.13 03/19/2021    NEUTROABS 3.15 03/19/2021    LYMPHSABS 1.43 03/19/2021    MONOSABS 0.40 03/19/2021    EOSABS 0.09 03/19/2021    BASOSABS 0.04 03/19/2021       Lab " Results   Component Value Date    GLUCOSE 181 (H) 03/19/2021    BUN 17 03/19/2021    CREATININE 0.78 03/19/2021     03/19/2021    K 4.5 03/19/2021     03/19/2021    CO2 28.7 03/19/2021    CALCIUM 9.5 03/19/2021    PROTEINTOT 7.6 03/19/2021    ALBUMIN 4.50 03/19/2021    BILITOT 1.6 (H) 03/19/2021    ALKPHOS 125 (H) 03/19/2021    AST 14 03/19/2021    ALT 17 03/19/2021       Ct Abdomen Pelvis Without Contrast    Result Date: 3/5/2019  No evidence of metastatic disease involving the chest, abdomen or pelvis.  This study was performed with techniques to keep radiation doses as low as reasonably achievable (ALARA). Individualized dose reduction techniques using automated exposure control or adjustment of mA and/or kV according to the patient size were employed.  This report was finalized on 3/5/2019 2:45 PM by Dariel Lan M.D..    Ct Chest Without Contrast    Result Date: 3/5/2019  No evidence of metastatic disease involving the chest, abdomen or pelvis.  This study was performed with techniques to keep radiation doses as low as reasonably achievable (ALARA). Individualized dose reduction techniques using automated exposure control or adjustment of mA and/or kV according to the patient size were employed.  This report was finalized on 3/5/2019 2:45 PM by Dariel Lan M.D..    Nm Bone Scan Whole Body    Result Date: 3/5/2019  No scintigraphic evidence of metastatic disease.   This report was finalized on 3/5/2019 4:11 PM by Dariel Lan M.D..    Mri Breast Bilateral Diagnostic With & Without Contrast    Result Date: 1/8/2019  1. Biopsy-proven 0.5-0.6 cm invasive ductal carcinoma in the far posterior left 3:00 region. There is no evidence of multifocal/multicentric disease in the left breast. 2. Negative right breast MRI. 3. No MRI evidence of lymphadenopathy.  RECOMMENDATIONS: Surgical follow-up with Dr. Angel.   BI-RADS CATEGORY: 6, KNOWN BIOPSY PROVEN MALIGNANCY.  FINAL MRI RESULTS  AND RECOMMENDATIONS WILL BE CALLED TO THE PATIENT BY OUR LEAD BREAST MRI TECHNOLOGIST.  This report was finalized on 1/8/2019 4:37 PM by Dr. Patti Quinonez MD.      Nm [place] Node Injection Only    Result Date: 2/8/2019  Radiopharmaceutical filtered sulfur colloid used for the injection for left breast lymphoscintigraphy and sentinel node mapping.  DICTATED:   2/8/2019 EDITED/ls :   2/8/2019    This report was finalized on 2/8/2019 4:42 PM by Dr. Fish Diaz.            Assessment/Plan  1. Stage IB triple negative ductal carcinoma of the left breast status post lumpectomy. She completed 4 cycles of Taxotere and cytoxan and completed radiation July 31,2019.  She should have had a 6 month mammogram. MRI was due in October and was not completed. I will ask my office to assist with scheduling mammogram and will plan to have MRI once completed.   We will check a CBC and a CMP today and I will call her with results.    2. Diabetes.  Continue Metformin and Avapro as prescribed by PCP.  She continues to use insulin.  She will follow up with primary care as scheduled.    3. Peripheral neuropathy is Grade 1-2.  Stable.      4. Obesity.   She will continue to eat a healthy diet and exercise as tolerated.     5. Skin nodules. Resolved.    Follow up in 6 months.         PARDEEP Bae  [facility]         Orders Placed This Encounter   Procedures   • Comprehensive Metabolic Panel   • Comprehensive Metabolic Panel   • CBC & Differential   • CBC & Differential       12/17/2021

## 2022-01-07 ENCOUNTER — TRANSCRIBE ORDERS (OUTPATIENT)
Dept: ADMINISTRATIVE | Facility: HOSPITAL | Age: 67
End: 2022-01-07

## 2022-01-07 DIAGNOSIS — R92.8 ABNORMAL MAMMOGRAM: Primary | ICD-10-CM

## 2022-02-25 ENCOUNTER — HOSPITAL ENCOUNTER (OUTPATIENT)
Dept: MAMMOGRAPHY | Facility: HOSPITAL | Age: 67
Discharge: HOME | End: 2022-02-25
Admitting: NURSE PRACTITIONER

## 2022-02-25 DIAGNOSIS — R92.8 ABNORMAL MAMMOGRAM: ICD-10-CM

## 2022-02-25 PROCEDURE — G0279 TOMOSYNTHESIS, MAMMO: HCPCS

## 2022-02-25 PROCEDURE — G0279 TOMOSYNTHESIS, MAMMO: HCPCS | Performed by: RADIOLOGY

## 2022-02-25 PROCEDURE — 77066 DX MAMMO INCL CAD BI: CPT | Performed by: RADIOLOGY

## 2022-02-25 PROCEDURE — 77066 DX MAMMO INCL CAD BI: CPT

## 2022-06-13 ENCOUNTER — OFFICE VISIT (OUTPATIENT)
Dept: ONCOLOGY | Facility: CLINIC | Age: 67
End: 2022-06-13

## 2022-06-13 VITALS
RESPIRATION RATE: 16 BRPM | TEMPERATURE: 97.5 F | SYSTOLIC BLOOD PRESSURE: 179 MMHG | DIASTOLIC BLOOD PRESSURE: 82 MMHG | HEART RATE: 63 BPM | BODY MASS INDEX: 34.73 KG/M2 | OXYGEN SATURATION: 99 % | WEIGHT: 196 LBS | HEIGHT: 63 IN

## 2022-06-13 DIAGNOSIS — Z17.1 MALIGNANT NEOPLASM OF UPPER-OUTER QUADRANT OF LEFT BREAST IN FEMALE, ESTROGEN RECEPTOR NEGATIVE: Primary | ICD-10-CM

## 2022-06-13 DIAGNOSIS — C50.412 MALIGNANT NEOPLASM OF UPPER-OUTER QUADRANT OF LEFT BREAST IN FEMALE, ESTROGEN RECEPTOR NEGATIVE: Primary | ICD-10-CM

## 2022-06-13 PROCEDURE — 99214 OFFICE O/P EST MOD 30 MIN: CPT | Performed by: NURSE PRACTITIONER

## 2022-06-13 RX ORDER — OMEPRAZOLE 40 MG/1
40 CAPSULE, DELAYED RELEASE ORAL DAILY
COMMUNITY
Start: 2022-06-08

## 2022-06-13 RX ORDER — LISINOPRIL 5 MG/1
5 TABLET ORAL DAILY
COMMUNITY
Start: 2022-05-19

## 2022-06-13 RX ORDER — LOSARTAN POTASSIUM 100 MG/1
100 TABLET ORAL DAILY
COMMUNITY
Start: 2022-04-25

## 2022-06-13 NOTE — PROGRESS NOTES
PROBLEM LIST:  Oncology/Hematology History   Malignant neoplasm of upper-outer quadrant of left breast in female, estrogen receptor negative (HCC)   1/15/2019 Initial Diagnosis    Malignant neoplasm of upper-outer quadrant of left breast in female, estrogen receptor negative (CMS/HCC)  Abnormal mammogram and a biopsy-proven poorly differentiated infiltrating ductal carcinoma in the left 3 o'clock position measuring 6 mm.            2/8/2019 Surgery    Surgery  Lumpectomy by Dr. PATINO on 2/8/2019.   Grade 3, high grade 6 mm tumor with margins negative by 2 mm.    Tumor was ER/[place]-negative HER-2/jakob negative and 0 of 1 lymph node was positive.             3/5/2019 Imaging    CT and bone scan scheduled 03/05/2019     3/5/2019 - 5/9/2019 Chemotherapy    OP BREAST TC DOCEtaxel / Cyclophosphamide  Start date 03/07/2019 pending.   Education 03/05/2019  Completed 05/09/2019 6/19/2019 - 7/31/2019 Radiation    Radiation OncologyTreatment Course:  Ava Mota received 5500 cGy in 30 fractions to left breast via External Beam Radiation - EBRT.     1/2/2020 Survivorship    Survivorship Care Plan completed and discussed with patient.  Copy of Survivorship Care Plan provided to patient and discussed with Mila ROBERTO Survivorship plan will be mailed to patient's address.                       REASON FOR VISIT: Triple Negative Breast cancer    HISTORY OF PRESENT ILLNESS:   67 y.o.  female presents today for management of triple negative breast cancer.  She completed 4 cycles of adjuvant chemotherapy with Taxotere and Cytoxan. She completed radiation on July 31st 2019. She says she feels great.  She has gained about 6lbs back from last visit. She has been staying very busy.   Denies headaches, night sweats. No recent infections. No new bone pain. She was sick in October and was unable to have mammogram done. She says it lasted about 6 weeks and was unable to do any scheduled scans.       Past medical history, social  "history and family history was reviewed and unchanged from prior visit.    Review of Systems:    Review of Systems   Constitutional: Positive for fatigue.   HENT:  Negative.    Eyes: Negative.    Respiratory: Negative.    Cardiovascular: Negative.    Gastrointestinal: Negative.    Endocrine: Negative.    Genitourinary: Negative.     Musculoskeletal: Negative.    Skin: Negative.    Neurological: Negative.    Hematological: Negative.    Psychiatric/Behavioral: Negative.       A comprehensive 14 point review of systems was performed and was negative except as mentioned.      Medications:  The current medication list was reviewed in the EMR    ALLERGIES:    Allergies   Allergen Reactions   • Iodine Anaphylaxis     PT STATES SHE 'PASSED OUT ' AFTER CT CONTRAST.    • Dapagliflozin Unknown - Low Severity     severe yeast infection         Physical Exam    VITAL SIGNS:  /82   Pulse 63   Temp 97.5 °F (36.4 °C) (Temporal)   Resp 16   Ht 160 cm (63\")   Wt 88.9 kg (196 lb)   SpO2 99%   BMI 34.72 kg/m²     Wt Readings from Last 3 Encounters:   06/13/22 88.9 kg (196 lb)   12/17/21 88.9 kg (196 lb)   03/19/21 86.2 kg (190 lb)        General: well appearing female in no acute distress  Neuro: alert and oriented  HEENT: sclera anicteric, oropharynx clear  Lymphatics: no cervical, supraclavicular, or axillary adenopathy  Cardiovascular: regular rate and rhythm, no murmurs  Lungs: clear to auscultation bilaterally  Abdomen: soft, nontender, nondistended.  No palpable organomegaly  Extremeties: no lower extremity edema  Skin: no rashes, lesions, bruising, or petechiae  Psych: mood and affect appropriate        RECENT LABS:    Lab Results   Component Value Date    HGB 14.9 03/19/2021    HCT 45.5 03/19/2021    MCV 88.5 03/19/2021     03/19/2021    WBC 5.13 03/19/2021    NEUTROABS 3.15 03/19/2021    LYMPHSABS 1.43 03/19/2021    MONOSABS 0.40 03/19/2021    EOSABS 0.09 03/19/2021    BASOSABS 0.04 03/19/2021       Lab " Results   Component Value Date    GLUCOSE 181 (H) 03/19/2021    BUN 17 03/19/2021    CREATININE 0.78 03/19/2021     03/19/2021    K 4.5 03/19/2021     03/19/2021    CO2 28.7 03/19/2021    CALCIUM 9.5 03/19/2021    PROTEINTOT 7.6 03/19/2021    ALBUMIN 4.50 03/19/2021    BILITOT 1.6 (H) 03/19/2021    ALKPHOS 125 (H) 03/19/2021    AST 14 03/19/2021    ALT 17 03/19/2021       Ct Abdomen Pelvis Without Contrast    Result Date: 3/5/2019  No evidence of metastatic disease involving the chest, abdomen or pelvis.  This study was performed with techniques to keep radiation doses as low as reasonably achievable (ALARA). Individualized dose reduction techniques using automated exposure control or adjustment of mA and/or kV according to the patient size were employed.  This report was finalized on 3/5/2019 2:45 PM by Dariel Lan M.D..    Ct Chest Without Contrast    Result Date: 3/5/2019  No evidence of metastatic disease involving the chest, abdomen or pelvis.  This study was performed with techniques to keep radiation doses as low as reasonably achievable (ALARA). Individualized dose reduction techniques using automated exposure control or adjustment of mA and/or kV according to the patient size were employed.  This report was finalized on 3/5/2019 2:45 PM by Dariel Lan M.D..    Nm Bone Scan Whole Body    Result Date: 3/5/2019  No scintigraphic evidence of metastatic disease.   This report was finalized on 3/5/2019 4:11 PM by Dariel Lan M.D..    Mri Breast Bilateral Diagnostic With & Without Contrast    Result Date: 1/8/2019  1. Biopsy-proven 0.5-0.6 cm invasive ductal carcinoma in the far posterior left 3:00 region. There is no evidence of multifocal/multicentric disease in the left breast. 2. Negative right breast MRI. 3. No MRI evidence of lymphadenopathy.  RECOMMENDATIONS: Surgical follow-up with Dr. Angel.   BI-RADS CATEGORY: 6, KNOWN BIOPSY PROVEN MALIGNANCY.  FINAL MRI RESULTS  AND RECOMMENDATIONS WILL BE CALLED TO THE PATIENT BY OUR LEAD BREAST MRI TECHNOLOGIST.  This report was finalized on 1/8/2019 4:37 PM by Dr. Patti Quinonez MD.      Nm [place] Node Injection Only    Result Date: 2/8/2019  Radiopharmaceutical filtered sulfur colloid used for the injection for left breast lymphoscintigraphy and sentinel node mapping.  DICTATED:   2/8/2019 EDITED/ls :   2/8/2019    This report was finalized on 2/8/2019 4:42 PM by Dr. Fish Diaz.            Assessment/Plan  1. Stage IB triple negative ductal carcinoma of the left breast status post lumpectomy. She completed 4 cycles of Taxotere and cytoxan and completed radiation July 31,2019.      We discussed mammogram and February 2022 showed BI-RADS 2 benign findings.  She will start routine annual screening mammograms that will be due after February 25, 2023.  MRI scheduled for August 2022.  Patient had labs last month with PCP.    2. Diabetes.  Continue Metformin and Avapro as prescribed by PCP.  She continues to use insulin.  She will follow up with primary care as scheduled.    3. Peripheral neuropathy is Grade 1-2.  Stable.      4. Obesity.  Weight is stable.  She will continue to eat a healthy diet and exercise as tolerated.     5. Skin nodules. Resolved.    Follow up in 6 months.         PARDEEP Bae  [facility]    Return in (Approximately): 6 months    No orders of the defined types were placed in this encounter.      6/13/2022

## 2022-06-23 ENCOUNTER — APPOINTMENT (OUTPATIENT)
Dept: MAMMOGRAPHY | Facility: HOSPITAL | Age: 67
End: 2022-06-23

## 2022-07-26 ENCOUNTER — TELEPHONE (OUTPATIENT)
Dept: ONCOLOGY | Facility: CLINIC | Age: 67
End: 2022-07-26

## 2022-07-26 NOTE — TELEPHONE ENCOUNTER
----- Message from PARDEEP Bae sent at 7/26/2022  3:54 PM EDT -----  Regarding: RE:PHONE CALL  Contact: [phone]  We have discussed nothing concerning her pancreas. She is breast cancer. Thanks.   ----- Message -----  From: Nimco Brito RN  Sent: 7/26/2022   3:27 PM EDT  To: Mila Hoyt APRN  Subject: FW:PHONE CALL                                    Ajay Zaragoza & Kush,    This lady says her PCP told her that her pancrease has quit working. She said Diay she has talked to you before. But I'm confused on what she wants. She did ask for a referral to endocrinology. Have you all discussed anything with her?    Geovanni Matute      ----- Message -----  From: Alfie Thompson  Sent: 7/26/2022   2:04 PM EDT  To: Nimco Brito, PETERSON  Subject: RE:PHONE CALL                                    Pt requested a call about some new issues that's going on,   Thanks  Kush

## 2022-07-26 NOTE — TELEPHONE ENCOUNTER
Returned call t patient informing her that we needed her to contact her pcp to set up her referral since she believed patient had new issues with her pancrease.

## 2022-08-09 ENCOUNTER — HOSPITAL ENCOUNTER (OUTPATIENT)
Dept: MRI IMAGING | Facility: HOSPITAL | Age: 67
Discharge: HOME | End: 2022-08-09
Admitting: NURSE PRACTITIONER

## 2022-08-09 DIAGNOSIS — C50.812 MALIGNANT NEOPLASM OF OVERLAPPING SITES OF LEFT FEMALE BREAST: ICD-10-CM

## 2022-08-09 PROCEDURE — C8937 CAD BREAST MRI: HCPCS

## 2022-08-09 PROCEDURE — 0 GADOBENATE DIMEGLUMINE 529 MG/ML SOLUTION: Performed by: NURSE PRACTITIONER

## 2022-08-09 PROCEDURE — 77049 MRI BREAST C-+ W/CAD BI: CPT | Performed by: RADIOLOGY

## 2022-08-09 PROCEDURE — C8908 MRI W/O FOL W/CONT, BREAST,: HCPCS

## 2022-08-09 PROCEDURE — A9577 INJ MULTIHANCE: HCPCS | Performed by: NURSE PRACTITIONER

## 2022-08-09 RX ADMIN — GADOBENATE DIMEGLUMINE 18 ML: 529 INJECTION, SOLUTION INTRAVENOUS at 11:28

## 2022-08-10 ENCOUNTER — TELEPHONE (OUTPATIENT)
Dept: MRI IMAGING | Facility: HOSPITAL | Age: 67
End: 2022-08-10

## 2023-01-16 ENCOUNTER — TRANSCRIBE ORDERS (OUTPATIENT)
Dept: ADMINISTRATIVE | Facility: HOSPITAL | Age: 68
End: 2023-01-16
Payer: MEDICARE

## 2023-01-16 DIAGNOSIS — Z12.31 VISIT FOR SCREENING MAMMOGRAM: Primary | ICD-10-CM

## 2023-01-20 ENCOUNTER — OFFICE VISIT (OUTPATIENT)
Dept: ONCOLOGY | Facility: CLINIC | Age: 68
End: 2023-01-20
Payer: MEDICARE

## 2023-01-20 VITALS
WEIGHT: 197 LBS | BODY MASS INDEX: 34.91 KG/M2 | SYSTOLIC BLOOD PRESSURE: 183 MMHG | HEART RATE: 69 BPM | TEMPERATURE: 97.1 F | HEIGHT: 63 IN | RESPIRATION RATE: 16 BRPM | OXYGEN SATURATION: 98 % | DIASTOLIC BLOOD PRESSURE: 85 MMHG

## 2023-01-20 DIAGNOSIS — C50.412 MALIGNANT NEOPLASM OF UPPER-OUTER QUADRANT OF LEFT BREAST IN FEMALE, ESTROGEN RECEPTOR NEGATIVE: Primary | ICD-10-CM

## 2023-01-20 DIAGNOSIS — Z17.1 MALIGNANT NEOPLASM OF UPPER-OUTER QUADRANT OF LEFT BREAST IN FEMALE, ESTROGEN RECEPTOR NEGATIVE: Primary | ICD-10-CM

## 2023-01-20 PROCEDURE — 99213 OFFICE O/P EST LOW 20 MIN: CPT | Performed by: INTERNAL MEDICINE

## 2023-01-20 RX ORDER — EZETIMIBE 10 MG/1
10 TABLET ORAL DAILY
COMMUNITY
Start: 2022-12-15

## 2023-01-20 RX ORDER — PANCRELIPASE 60000; 12000; 38000 [USP'U]/1; [USP'U]/1; [USP'U]/1
CAPSULE, DELAYED RELEASE PELLETS ORAL
COMMUNITY
Start: 2023-01-18

## 2023-01-20 RX ORDER — DOCUSATE SODIUM 100 MG/1
100 CAPSULE, LIQUID FILLED ORAL 2 TIMES DAILY
COMMUNITY
Start: 2022-11-11

## 2023-01-20 NOTE — PROGRESS NOTES
PROBLEM LIST:  Oncology/Hematology History   Malignant neoplasm of upper-outer quadrant of left breast in female, estrogen receptor negative (HCC)   1/15/2019 Initial Diagnosis    Malignant neoplasm of upper-outer quadrant of left breast in female, estrogen receptor negative (CMS/HCC)  Abnormal mammogram and a biopsy-proven poorly differentiated infiltrating ductal carcinoma in the left 3 o'clock position measuring 6 mm.            2/8/2019 Surgery    Surgery  Lumpectomy by Dr. PATINO on 2/8/2019.   Grade 3, high grade 6 mm tumor with margins negative by 2 mm.    Tumor was ER/IN-negative HER-2/jakob negative and 0 of 1 lymph node was positive.             3/5/2019 Imaging    CT and bone scan scheduled 03/05/2019     3/5/2019 - 5/9/2019 Chemotherapy    OP BREAST TC DOCEtaxel / Cyclophosphamide  Start date 03/07/2019 pending.   Education 03/05/2019  Completed 05/09/2019 6/19/2019 - 7/31/2019 Radiation    Radiation OncologyTreatment Course:  Ava Mota received 5500 cGy in 30 fractions to left breast via External Beam Radiation - EBRT.     1/2/2020 Survivorship    Survivorship Care Plan completed and discussed with patient.  Copy of Survivorship Care Plan provided to patient and discussed with Mila ROBERTO Survivorship plan will be mailed to patient's address.                       REASON FOR VISIT: Triple Negative Breast cancer    HISTORY OF PRESENT ILLNESS:   67 y.o.  female presents today for management of triple negative breast cancer.  She completed 4 cycles of adjuvant chemotherapy with Taxotere and Cytoxan. She completed radiation on July 31st 2019.  She has underlying diabetes.  She also was diagnosed with pancreatic insufficiency for which she is on Creon.  Otherwise she is doing well from a cancer standpoint.  No symptoms to suggest recurrence.      Past medical history, social history and family history was reviewed and unchanged from prior visit.    Review of Systems:    Review of Systems    Constitutional: Positive for fatigue.   HENT:  Negative.    Eyes: Negative.    Respiratory: Negative.    Cardiovascular: Negative.    Gastrointestinal: Negative.    Endocrine: Negative.    Genitourinary: Negative.     Musculoskeletal: Negative.    Skin: Negative.    Neurological: Negative.    Hematological: Negative.    Psychiatric/Behavioral: Negative.       A comprehensive 14 point review of systems was performed and was negative except as mentioned.      Medications:  The current medication list was reviewed in the EMR    ALLERGIES:    Allergies   Allergen Reactions   • Iodine Anaphylaxis     PT STATES SHE 'PASSED OUT ' AFTER CT CONTRAST.    • Dapagliflozin Unknown - Low Severity     severe yeast infection         Physical Exam    VITAL SIGNS:  There were no vitals taken for this visit.    Wt Readings from Last 3 Encounters:   06/13/22 88.9 kg (196 lb)   08/09/22 86.2 kg (190 lb)   12/17/21 88.9 kg (196 lb)        General: well appearing female in no acute distress  Neuro: alert and oriented  HEENT: sclera anicteric, oropharynx clear  Lymphatics: no cervical, supraclavicular, or axillary adenopathy  Cardiovascular: regular rate and rhythm, no murmurs  Lungs: clear to auscultation bilaterally  Abdomen: soft, nontender, nondistended.  No palpable organomegaly  Extremeties: no lower extremity edema  Skin: no rashes, lesions, bruising, or petechiae  Psych: mood and affect appropriate        RECENT LABS:    Lab Results   Component Value Date    HGB 14.9 03/19/2021    HCT 45.5 03/19/2021    MCV 88.5 03/19/2021     03/19/2021    WBC 5.13 03/19/2021    NEUTROABS 3.15 03/19/2021    LYMPHSABS 1.43 03/19/2021    MONOSABS 0.40 03/19/2021    EOSABS 0.09 03/19/2021    BASOSABS 0.04 03/19/2021       Lab Results   Component Value Date    GLUCOSE 181 (H) 03/19/2021    BUN 17 03/19/2021    CREATININE 0.78 03/19/2021     03/19/2021    K 4.5 03/19/2021     03/19/2021    CO2 28.7 03/19/2021    CALCIUM 9.5  03/19/2021    PROTEINTOT 7.6 03/19/2021    ALBUMIN 4.50 03/19/2021    BILITOT 1.6 (H) 03/19/2021    ALKPHOS 125 (H) 03/19/2021    AST 14 03/19/2021    ALT 17 03/19/2021             Assessment/Plan  1. Stage IB triple negative ductal carcinoma of the left breast status post lumpectomy. She completed 4 cycles of Taxotere and cytoxan and completed radiation July 31,2019.  She alternates between annual screening mammograms and MRIs.  So every 6 months she has some type of imaging.  She has imaging scheduled for March.  At this point she is almost 4 years out.  I am going to check on her 1 more year and if she is doing well discharge her from my clinic.       2. Diabetes.  Continue Metformin and Avapro as prescribed by PCP.  She continues to use insulin.  She will follow up with primary care as scheduled.    3. Peripheral neuropathy is Grade 1-2.  Stable.      4. Obesity.  Weight is stable.  She will continue to eat a healthy diet and exercise as tolerated.     5. Skin nodules. Resolved.    6.  She now has pancreatic insufficiency for which she is on Creon.        Jacy Uribe MD  [facility]         No orders of the defined types were placed in this encounter.      1/20/2023

## 2023-03-09 ENCOUNTER — HOSPITAL ENCOUNTER (OUTPATIENT)
Dept: MAMMOGRAPHY | Facility: HOSPITAL | Age: 68
Discharge: HOME | End: 2023-03-09
Admitting: NURSE PRACTITIONER
Payer: MEDICARE

## 2023-03-09 DIAGNOSIS — Z12.31 VISIT FOR SCREENING MAMMOGRAM: ICD-10-CM

## 2023-03-09 PROCEDURE — 77063 BREAST TOMOSYNTHESIS BI: CPT

## 2023-03-09 PROCEDURE — 77067 SCR MAMMO BI INCL CAD: CPT

## 2023-03-09 PROCEDURE — 77063 BREAST TOMOSYNTHESIS BI: CPT | Performed by: RADIOLOGY

## 2023-03-09 PROCEDURE — 77067 SCR MAMMO BI INCL CAD: CPT | Performed by: RADIOLOGY

## 2023-08-17 ENCOUNTER — TELEPHONE (OUTPATIENT)
Dept: ONCOLOGY | Facility: CLINIC | Age: 68
End: 2023-08-17
Payer: MEDICARE

## 2023-08-17 NOTE — TELEPHONE ENCOUNTER
Returned call to patient. Asking patient how nurse can help. Patient stating she has been hurting under left arm. Stating she doesn't see any lumps but it's painful to touch. Patient reporting she has had no vaccines nor has she been sick. Informing patient that nurse will discuss with Dr. Braun and let her know what he wants to do.

## 2023-08-18 DIAGNOSIS — Z17.1 MALIGNANT NEOPLASM OF UPPER-OUTER QUADRANT OF LEFT BREAST IN FEMALE, ESTROGEN RECEPTOR NEGATIVE: Primary | ICD-10-CM

## 2023-08-18 DIAGNOSIS — C50.412 MALIGNANT NEOPLASM OF UPPER-OUTER QUADRANT OF LEFT BREAST IN FEMALE, ESTROGEN RECEPTOR NEGATIVE: Primary | ICD-10-CM

## 2023-08-18 NOTE — TELEPHONE ENCOUNTER
Called patient informing her that Dr. Braun want her to have a mammogram and ultrasound once she gets back in town. Patient stating she will call when she gets back. Informing her she can call office to find out her appointments.

## 2023-09-12 ENCOUNTER — HOSPITAL ENCOUNTER (OUTPATIENT)
Dept: ULTRASOUND IMAGING | Facility: HOSPITAL | Age: 68
Discharge: HOME | End: 2023-09-12
Payer: MEDICARE

## 2023-09-12 ENCOUNTER — HOSPITAL ENCOUNTER (OUTPATIENT)
Dept: MAMMOGRAPHY | Facility: HOSPITAL | Age: 68
Discharge: HOME | End: 2023-09-12
Payer: MEDICARE

## 2023-09-12 DIAGNOSIS — C50.412 MALIGNANT NEOPLASM OF UPPER-OUTER QUADRANT OF LEFT BREAST IN FEMALE, ESTROGEN RECEPTOR NEGATIVE: ICD-10-CM

## 2023-09-12 DIAGNOSIS — Z17.1 MALIGNANT NEOPLASM OF UPPER-OUTER QUADRANT OF LEFT BREAST IN FEMALE, ESTROGEN RECEPTOR NEGATIVE: ICD-10-CM

## 2023-09-12 PROCEDURE — 76642 ULTRASOUND BREAST LIMITED: CPT

## 2023-09-12 PROCEDURE — G0279 TOMOSYNTHESIS, MAMMO: HCPCS

## 2023-09-12 PROCEDURE — 76642 ULTRASOUND BREAST LIMITED: CPT | Performed by: RADIOLOGY

## 2023-09-12 PROCEDURE — 77065 DX MAMMO INCL CAD UNI: CPT

## 2023-09-12 PROCEDURE — G0279 TOMOSYNTHESIS, MAMMO: HCPCS | Performed by: RADIOLOGY

## 2023-09-12 PROCEDURE — 77065 DX MAMMO INCL CAD UNI: CPT | Performed by: RADIOLOGY

## 2023-09-22 ENCOUNTER — APPOINTMENT (OUTPATIENT)
Dept: NUCLEAR MEDICINE | Facility: HOSPITAL | Age: 68
End: 2023-09-22
Payer: MEDICARE

## 2023-09-22 ENCOUNTER — HOSPITAL ENCOUNTER (OUTPATIENT)
Facility: HOSPITAL | Age: 68
Discharge: HOME | End: 2023-09-24
Attending: EMERGENCY MEDICINE | Admitting: INTERNAL MEDICINE
Payer: MEDICARE

## 2023-09-22 ENCOUNTER — APPOINTMENT (OUTPATIENT)
Dept: GENERAL RADIOLOGY | Facility: HOSPITAL | Age: 68
End: 2023-09-22
Payer: MEDICARE

## 2023-09-22 ENCOUNTER — APPOINTMENT (OUTPATIENT)
Dept: CT IMAGING | Facility: HOSPITAL | Age: 68
End: 2023-09-22
Payer: MEDICARE

## 2023-09-22 DIAGNOSIS — R10.13 EPIGASTRIC PAIN: ICD-10-CM

## 2023-09-22 DIAGNOSIS — R09.02 HYPOXIA: ICD-10-CM

## 2023-09-22 DIAGNOSIS — R79.89 ELEVATED TROPONIN: ICD-10-CM

## 2023-09-22 DIAGNOSIS — K21.9 GASTROESOPHAGEAL REFLUX DISEASE, UNSPECIFIED WHETHER ESOPHAGITIS PRESENT: ICD-10-CM

## 2023-09-22 DIAGNOSIS — R07.9 ACUTE CHEST PAIN: Primary | ICD-10-CM

## 2023-09-22 DIAGNOSIS — R63.0 ANOREXIA: ICD-10-CM

## 2023-09-22 PROBLEM — N20.0 KIDNEY STONE: Status: ACTIVE | Noted: 2023-09-22

## 2023-09-22 PROBLEM — I10 HTN (HYPERTENSION): Status: ACTIVE | Noted: 2023-09-22

## 2023-09-22 PROBLEM — R82.81 PYURIA: Status: ACTIVE | Noted: 2023-09-22

## 2023-09-22 LAB
ALBUMIN SERPL-MCNC: 3.8 G/DL (ref 3.5–5.2)
ALBUMIN/GLOB SERPL: 1.2 G/DL
ALP SERPL-CCNC: 131 U/L (ref 39–117)
ALT SERPL W P-5'-P-CCNC: 20 U/L (ref 1–33)
ANION GAP SERPL CALCULATED.3IONS-SCNC: 10 MMOL/L (ref 5–15)
AST SERPL-CCNC: 20 U/L (ref 1–32)
B PARAPERT DNA SPEC QL NAA+PROBE: NOT DETECTED
B PERT DNA SPEC QL NAA+PROBE: NOT DETECTED
BACTERIA UR QL AUTO: ABNORMAL /HPF
BASOPHILS # BLD AUTO: 0.04 10*3/MM3 (ref 0–0.2)
BASOPHILS NFR BLD AUTO: 0.7 % (ref 0–1.5)
BILIRUB SERPL-MCNC: 1.3 MG/DL (ref 0–1.2)
BILIRUB UR QL STRIP: NEGATIVE
BUN SERPL-MCNC: 15 MG/DL (ref 8–23)
BUN/CREAT SERPL: 21.4 (ref 7–25)
C PNEUM DNA NPH QL NAA+NON-PROBE: NOT DETECTED
CALCIUM SPEC-SCNC: 8.9 MG/DL (ref 8.6–10.5)
CHLORIDE SERPL-SCNC: 102 MMOL/L (ref 98–107)
CLARITY UR: CLEAR
CO2 SERPL-SCNC: 26 MMOL/L (ref 22–29)
COLOR UR: YELLOW
CREAT SERPL-MCNC: 0.7 MG/DL (ref 0.57–1)
D DIMER PPP FEU-MCNC: 0.79 MCGFEU/ML (ref 0–0.7)
D-LACTATE SERPL-SCNC: 1.9 MMOL/L (ref 0.5–2)
DEPRECATED RDW RBC AUTO: 41.2 FL (ref 37–54)
EGFRCR SERPLBLD CKD-EPI 2021: 93.2 ML/MIN/1.73
EOSINOPHIL # BLD AUTO: 0.13 10*3/MM3 (ref 0–0.4)
EOSINOPHIL NFR BLD AUTO: 2.4 % (ref 0.3–6.2)
ERYTHROCYTE [DISTWIDTH] IN BLOOD BY AUTOMATED COUNT: 13.1 % (ref 12.3–15.4)
FLUAV SUBTYP SPEC NAA+PROBE: NOT DETECTED
FLUBV RNA ISLT QL NAA+PROBE: NOT DETECTED
GLOBULIN UR ELPH-MCNC: 3.2 GM/DL
GLUCOSE SERPL-MCNC: 222 MG/DL (ref 65–99)
GLUCOSE UR STRIP-MCNC: ABNORMAL MG/DL
HADV DNA SPEC NAA+PROBE: NOT DETECTED
HCOV 229E RNA SPEC QL NAA+PROBE: NOT DETECTED
HCOV HKU1 RNA SPEC QL NAA+PROBE: NOT DETECTED
HCOV NL63 RNA SPEC QL NAA+PROBE: NOT DETECTED
HCOV OC43 RNA SPEC QL NAA+PROBE: NOT DETECTED
HCT VFR BLD AUTO: 44.1 % (ref 34–46.6)
HGB BLD-MCNC: 14.2 G/DL (ref 12–15.9)
HGB UR QL STRIP.AUTO: NEGATIVE
HMPV RNA NPH QL NAA+NON-PROBE: NOT DETECTED
HOLD SPECIMEN: NORMAL
HPIV1 RNA ISLT QL NAA+PROBE: NOT DETECTED
HPIV2 RNA SPEC QL NAA+PROBE: NOT DETECTED
HPIV3 RNA NPH QL NAA+PROBE: NOT DETECTED
HPIV4 P GENE NPH QL NAA+PROBE: NOT DETECTED
HYALINE CASTS UR QL AUTO: ABNORMAL /LPF
IMM GRANULOCYTES # BLD AUTO: 0.04 10*3/MM3 (ref 0–0.05)
IMM GRANULOCYTES NFR BLD AUTO: 0.7 % (ref 0–0.5)
KETONES UR QL STRIP: NEGATIVE
LEUKOCYTE ESTERASE UR QL STRIP.AUTO: ABNORMAL
LIPASE SERPL-CCNC: 22 U/L (ref 13–60)
LYMPHOCYTES # BLD AUTO: 1.77 10*3/MM3 (ref 0.7–3.1)
LYMPHOCYTES NFR BLD AUTO: 32.6 % (ref 19.6–45.3)
M PNEUMO IGG SER IA-ACNC: NOT DETECTED
MCH RBC QN AUTO: 27.9 PG (ref 26.6–33)
MCHC RBC AUTO-ENTMCNC: 32.2 G/DL (ref 31.5–35.7)
MCV RBC AUTO: 86.6 FL (ref 79–97)
MONOCYTES # BLD AUTO: 0.49 10*3/MM3 (ref 0.1–0.9)
MONOCYTES NFR BLD AUTO: 9 % (ref 5–12)
NEUTROPHILS NFR BLD AUTO: 2.96 10*3/MM3 (ref 1.7–7)
NEUTROPHILS NFR BLD AUTO: 54.6 % (ref 42.7–76)
NITRITE UR QL STRIP: NEGATIVE
NRBC BLD AUTO-RTO: 0 /100 WBC (ref 0–0.2)
NT-PROBNP SERPL-MCNC: 36.5 PG/ML (ref 0–900)
PH UR STRIP.AUTO: 6.5 [PH] (ref 5–8)
PLATELET # BLD AUTO: 281 10*3/MM3 (ref 140–450)
PMV BLD AUTO: 9.1 FL (ref 6–12)
POTASSIUM SERPL-SCNC: 4.2 MMOL/L (ref 3.5–5.2)
PROT SERPL-MCNC: 7 G/DL (ref 6–8.5)
PROT UR QL STRIP: NEGATIVE
QT INTERVAL: 418 MS
QTC INTERVAL: 444 MS
RBC # BLD AUTO: 5.09 10*6/MM3 (ref 3.77–5.28)
RBC # UR STRIP: ABNORMAL /HPF
REF LAB TEST METHOD: ABNORMAL
RHINOVIRUS RNA SPEC NAA+PROBE: NOT DETECTED
RSV RNA NPH QL NAA+NON-PROBE: NOT DETECTED
SARS-COV-2 RNA NPH QL NAA+NON-PROBE: NOT DETECTED
SODIUM SERPL-SCNC: 138 MMOL/L (ref 136–145)
SP GR UR STRIP: 1.01 (ref 1–1.03)
SQUAMOUS #/AREA URNS HPF: ABNORMAL /HPF
TROPONIN T SERPL HS-MCNC: 12 NG/L
TROPONIN T SERPL HS-MCNC: 8 NG/L
UROBILINOGEN UR QL STRIP: ABNORMAL
WBC # UR STRIP: ABNORMAL /HPF
WBC NRBC COR # BLD: 5.43 10*3/MM3 (ref 3.4–10.8)
WHOLE BLOOD HOLD COAG: NORMAL
WHOLE BLOOD HOLD SPECIMEN: NORMAL

## 2023-09-22 PROCEDURE — 78580 LUNG PERFUSION IMAGING: CPT

## 2023-09-22 PROCEDURE — 81001 URINALYSIS AUTO W/SCOPE: CPT | Performed by: EMERGENCY MEDICINE

## 2023-09-22 PROCEDURE — A9540 TC99M MAA: HCPCS | Performed by: EMERGENCY MEDICINE

## 2023-09-22 PROCEDURE — 36415 COLL VENOUS BLD VENIPUNCTURE: CPT

## 2023-09-22 PROCEDURE — 80053 COMPREHEN METABOLIC PANEL: CPT | Performed by: EMERGENCY MEDICINE

## 2023-09-22 PROCEDURE — 71250 CT THORAX DX C-: CPT

## 2023-09-22 PROCEDURE — G0378 HOSPITAL OBSERVATION PER HR: HCPCS

## 2023-09-22 PROCEDURE — 25010000002 HYDROMORPHONE PER 4 MG: Performed by: EMERGENCY MEDICINE

## 2023-09-22 PROCEDURE — 87086 URINE CULTURE/COLONY COUNT: CPT | Performed by: NURSE PRACTITIONER

## 2023-09-22 PROCEDURE — 93005 ELECTROCARDIOGRAM TRACING: CPT | Performed by: EMERGENCY MEDICINE

## 2023-09-22 PROCEDURE — 84484 ASSAY OF TROPONIN QUANT: CPT | Performed by: EMERGENCY MEDICINE

## 2023-09-22 PROCEDURE — 0 TECHNETIUM ALBUMIN AGGREGATED: Performed by: EMERGENCY MEDICINE

## 2023-09-22 PROCEDURE — 93005 ELECTROCARDIOGRAM TRACING: CPT | Performed by: NURSE PRACTITIONER

## 2023-09-22 PROCEDURE — 71045 X-RAY EXAM CHEST 1 VIEW: CPT

## 2023-09-22 PROCEDURE — 96375 TX/PRO/DX INJ NEW DRUG ADDON: CPT

## 2023-09-22 PROCEDURE — 74176 CT ABD & PELVIS W/O CONTRAST: CPT

## 2023-09-22 PROCEDURE — 96374 THER/PROPH/DIAG INJ IV PUSH: CPT

## 2023-09-22 PROCEDURE — 83880 ASSAY OF NATRIURETIC PEPTIDE: CPT | Performed by: NURSE PRACTITIONER

## 2023-09-22 PROCEDURE — 25010000002 ONDANSETRON PER 1 MG: Performed by: EMERGENCY MEDICINE

## 2023-09-22 PROCEDURE — 84484 ASSAY OF TROPONIN QUANT: CPT | Performed by: NURSE PRACTITIONER

## 2023-09-22 PROCEDURE — 85379 FIBRIN DEGRADATION QUANT: CPT | Performed by: NURSE PRACTITIONER

## 2023-09-22 PROCEDURE — 0202U NFCT DS 22 TRGT SARS-COV-2: CPT | Performed by: NURSE PRACTITIONER

## 2023-09-22 PROCEDURE — 85025 COMPLETE CBC W/AUTO DIFF WBC: CPT | Performed by: EMERGENCY MEDICINE

## 2023-09-22 PROCEDURE — 83605 ASSAY OF LACTIC ACID: CPT | Performed by: NURSE PRACTITIONER

## 2023-09-22 PROCEDURE — 83690 ASSAY OF LIPASE: CPT | Performed by: EMERGENCY MEDICINE

## 2023-09-22 PROCEDURE — 99284 EMERGENCY DEPT VISIT MOD MDM: CPT

## 2023-09-22 RX ORDER — DEXTROSE MONOHYDRATE 25 G/50ML
25 INJECTION, SOLUTION INTRAVENOUS
Status: DISCONTINUED | OUTPATIENT
Start: 2023-09-22 | End: 2023-09-24 | Stop reason: HOSPADM

## 2023-09-22 RX ORDER — HYDROMORPHONE HYDROCHLORIDE 1 MG/ML
0.5 INJECTION, SOLUTION INTRAMUSCULAR; INTRAVENOUS; SUBCUTANEOUS ONCE
Status: COMPLETED | OUTPATIENT
Start: 2023-09-22 | End: 2023-09-22

## 2023-09-22 RX ORDER — NICOTINE POLACRILEX 4 MG
15 LOZENGE BUCCAL
Status: DISCONTINUED | OUTPATIENT
Start: 2023-09-22 | End: 2023-09-24 | Stop reason: HOSPADM

## 2023-09-22 RX ORDER — INSULIN LISPRO 100 [IU]/ML
2-7 INJECTION, SOLUTION INTRAVENOUS; SUBCUTANEOUS
Status: DISCONTINUED | OUTPATIENT
Start: 2023-09-23 | End: 2023-09-24 | Stop reason: HOSPADM

## 2023-09-22 RX ORDER — ALUMINA, MAGNESIA, AND SIMETHICONE 2400; 2400; 240 MG/30ML; MG/30ML; MG/30ML
10 SUSPENSION ORAL ONCE
Status: COMPLETED | OUTPATIENT
Start: 2023-09-22 | End: 2023-09-22

## 2023-09-22 RX ORDER — SODIUM CHLORIDE 0.9 % (FLUSH) 0.9 %
10 SYRINGE (ML) INJECTION AS NEEDED
Status: DISCONTINUED | OUTPATIENT
Start: 2023-09-22 | End: 2023-09-24 | Stop reason: HOSPADM

## 2023-09-22 RX ORDER — ONDANSETRON 2 MG/ML
4 INJECTION INTRAMUSCULAR; INTRAVENOUS ONCE
Status: COMPLETED | OUTPATIENT
Start: 2023-09-22 | End: 2023-09-22

## 2023-09-22 RX ORDER — IBUPROFEN 600 MG/1
1 TABLET ORAL
Status: DISCONTINUED | OUTPATIENT
Start: 2023-09-22 | End: 2023-09-24 | Stop reason: HOSPADM

## 2023-09-22 RX ADMIN — HYDROMORPHONE HYDROCHLORIDE 0.5 MG: 1 INJECTION, SOLUTION INTRAMUSCULAR; INTRAVENOUS; SUBCUTANEOUS at 20:30

## 2023-09-22 RX ADMIN — KIT FOR THE PREPARATION OF TECHNETIUM TC 99M ALBUMIN AGGREGATED 1 DOSE: 2.5 INJECTION, POWDER, FOR SOLUTION INTRAVENOUS at 22:09

## 2023-09-22 RX ADMIN — ONDANSETRON 4 MG: 2 INJECTION INTRAMUSCULAR; INTRAVENOUS at 19:50

## 2023-09-22 RX ADMIN — ALUMINUM HYDROXIDE, MAGNESIUM HYDROXIDE, AND DIMETHICONE 10 ML: 400; 400; 40 SUSPENSION ORAL at 21:09

## 2023-09-22 NOTE — ED PROVIDER NOTES
Subjective   History of Present Illness  Pleasant patient presents the ER with mid right-sided back pain that radiates to her chest.  It is worse with food.  She had a gallbladder removed in .  There is a history of breast cancer around .  Worse with deep breathing and movement.      Review of Systems    Past Medical History:   Diagnosis Date    Breast cancer     LT 2019    Diabetes mellitus     Drug therapy     ENDED MAY 2019    Hx of radiation therapy     ENDED AUG 2019.    Hypertension        Allergies   Allergen Reactions    Iodine Anaphylaxis     PT STATES SHE 'PASSED OUT ' AFTER CT CONTRAST.     Dapagliflozin Unknown - Low Severity     severe yeast infection       Past Surgical History:   Procedure Laterality Date    BREAST BIOPSY Left     2019    BREAST LUMPECTOMY Left     2019    CARPAL TUNNEL RELEASE Left 2015     SECTION      x2    CHOLECYSTECTOMY  1973    COLONOSCOPY  2013    KNEE ARTHROPLASTY Bilateral      &     TUBAL ABDOMINAL LIGATION  1985       Family History   Problem Relation Age of Onset    Diabetes Mother     Heart disease Mother     Stroke Mother     Emphysema Father     Breast cancer Sister 48    Colon cancer Sister     Lung cancer Brother     Ovarian cancer Neg Hx        Social History     Socioeconomic History    Marital status:    Tobacco Use    Smoking status: Former     Packs/day: 0.50     Years: 2.00     Pack years: 1.00     Types: Cigarettes     Quit date:      Years since quittin.7    Smokeless tobacco: Never   Substance and Sexual Activity    Alcohol use: No    Drug use: No    Sexual activity: Defer           Objective   Physical Exam  Constitutional:       Appearance: She is well-developed.   HENT:      Head: Normocephalic and atraumatic.      Right Ear: External ear normal.      Left Ear: External ear normal.      Nose: Nose normal.   Eyes:      Conjunctiva/sclera: Conjunctivae normal.      Pupils: Pupils are equal, round, and  reactive to light.   Cardiovascular:      Rate and Rhythm: Normal rate and regular rhythm.      Heart sounds: Normal heart sounds.   Pulmonary:      Effort: Pulmonary effort is normal.      Breath sounds: Normal breath sounds.   Abdominal:      General: Bowel sounds are normal.      Palpations: Abdomen is soft.   Musculoskeletal:         General: Normal range of motion.      Cervical back: Normal range of motion and neck supple.   Skin:     General: Skin is warm and dry.   Neurological:      Mental Status: She is alert and oriented to person, place, and time.   Psychiatric:         Behavior: Behavior normal.         Thought Content: Thought content normal.         Judgment: Judgment normal.       Procedures           ED Course  ED Course as of 09/22/23 2305   Fri Sep 22, 2023   1839 Patient reports some possible allergy to IV contrast around 15 years ago.  It says anaphylaxis on the chart but she does not member ever having difficulty breathing.  She tells me she was getting a CAT scan done of her head and at 1 point she had difficulty seeing.  She is unsure if this is related to her headache or to the IV contrast.  Nonetheless she has not had a since then. [JM]   2052 Had a very long conversation with the patient we discussed the current findings on the CAT scans.  She does not want to be premedicated for IV contrast.  She tells me she remembers not being able to see anything for several hours after her study around 10 to 15 years ago.  She is unsure what the study was so there still is some discrepancy if her symptoms were from the IV contrast.  Nonetheless she is very scared of IV contrast and she is okay with getting the nuclear medicine study. [JM]   2054 Patient oxygen saturation around 90 to 92% standing at the side of the bed on room oxygen. [JM]   2239 Awaiting V/Q findings.  Second troponin from 8-12.  Still with pleuritic pain.Elevated D-dimer.  The heart score 4.  Somewhat food-related broad differential  including pulmonary embolism coronary artery disease gastritis.  Reasonable to bring her to the hospital for further evaluation. [JM]   2304 Recent room air oxygen saturation has been as low as 86 up to 92%.  Negative VQ scan.  Still no clear cause of her hypoxia and pleuritic chest pain. [JM]      ED Course User Index  [JM] Chuy Stafford, APRN           NM Lung Scan Perfusion Particulate   Final Result   Normal perfusion. No suspicious peripheral or wedge-shaped defects identified.         Electronically Signed: Sneha South MD     9/22/2023 10:56 PM EDT     Workstation ID: [id number]      CT Abdomen Pelvis Without Contrast   Final Result   1.3 mm nonobstructing right renal stone   2.Small hiatal hernia.                           Electronically Signed: Ricardo Oneal MD     9/22/2023 8:17 PM EDT     Workstation ID: [id number]      CT Chest Without Contrast Diagnostic   Final Result   1.No acute findings.                        Electronically Signed: Ricardo Oneal MD     9/22/2023 8:28 PM EDT     Workstation ID: [id number]      XR Chest 1 View   Final Result   Impression:      1. No acute cardiopulmonary disease.         Electronically Signed: Seth Guerra MD     9/22/2023 7:16 PM EDT     Workstation ID: [id number]                                        Medical Decision Making  Problems Addressed:  Acute chest pain: complicated acute illness or injury  Elevated troponin: complicated acute illness or injury  Hypoxia: complicated acute illness or injury    Amount and/or Complexity of Data Reviewed  External Data Reviewed: labs, radiology and ECG.  Labs: ordered.  Radiology: ordered.  ECG/medicine tests: ordered.    Risk  OTC drugs.  Prescription drug management.  Decision regarding hospitalization.        Final diagnoses:   Acute chest pain   Elevated troponin   Hypoxia       ED Disposition  ED Disposition       ED Disposition   Decision to Admit    Condition   --    Comment   --               No follow-up provider  specified.       Medication List      No changes were made to your prescriptions during this visit.            Chuy Stafford APRN  09/22/23 [id number]       Chuy Stafford APRN  09/22/23 0585

## 2023-09-22 NOTE — Clinical Note
Level of Care: Telemetry [5]   Diagnosis: Chest pain [307251]   Admitting Physician: ALLIE BRAMBILA III [[id number]   Attending Physician: ALLIE BRAMBILA III [[id number]   Bed Request Comments: tele obs

## 2023-09-23 LAB
ALBUMIN SERPL-MCNC: 3.7 G/DL (ref 3.5–5.2)
ALBUMIN/GLOB SERPL: 1.2 G/DL
ALP SERPL-CCNC: 126 U/L (ref 39–117)
ALT SERPL W P-5'-P-CCNC: 27 U/L (ref 1–33)
ANION GAP SERPL CALCULATED.3IONS-SCNC: 12 MMOL/L (ref 5–15)
AST SERPL-CCNC: 32 U/L (ref 1–32)
BILIRUB CONJ SERPL-MCNC: 0.2 MG/DL (ref 0–0.3)
BILIRUB SERPL-MCNC: 1.4 MG/DL (ref 0–1.2)
BUN SERPL-MCNC: 17 MG/DL (ref 8–23)
BUN/CREAT SERPL: 18.1 (ref 7–25)
CALCIUM SPEC-SCNC: 8.8 MG/DL (ref 8.6–10.5)
CHLORIDE SERPL-SCNC: 104 MMOL/L (ref 98–107)
CO2 SERPL-SCNC: 25 MMOL/L (ref 22–29)
CREAT SERPL-MCNC: 0.94 MG/DL (ref 0.57–1)
DEPRECATED RDW RBC AUTO: 41.1 FL (ref 37–54)
EGFRCR SERPLBLD CKD-EPI 2021: 65.4 ML/MIN/1.73
ERYTHROCYTE [DISTWIDTH] IN BLOOD BY AUTOMATED COUNT: 13.1 % (ref 12.3–15.4)
GEN 5 2HR TROPONIN T REFLEX: 7 NG/L
GLOBULIN UR ELPH-MCNC: 3.1 GM/DL
GLUCOSE BLDC GLUCOMTR-MCNC: 131 MG/DL (ref 70–130)
GLUCOSE BLDC GLUCOMTR-MCNC: 193 MG/DL (ref 70–130)
GLUCOSE BLDC GLUCOMTR-MCNC: 199 MG/DL (ref 70–130)
GLUCOSE BLDC GLUCOMTR-MCNC: 211 MG/DL (ref 70–130)
GLUCOSE BLDC GLUCOMTR-MCNC: 267 MG/DL (ref 70–130)
GLUCOSE SERPL-MCNC: 181 MG/DL (ref 65–99)
HBA1C MFR BLD: 8.7 % (ref 4.8–5.6)
HCT VFR BLD AUTO: 41.3 % (ref 34–46.6)
HGB BLD-MCNC: 13.5 G/DL (ref 12–15.9)
MAGNESIUM SERPL-MCNC: 2.3 MG/DL (ref 1.6–2.4)
MCH RBC QN AUTO: 28.2 PG (ref 26.6–33)
MCHC RBC AUTO-ENTMCNC: 32.7 G/DL (ref 31.5–35.7)
MCV RBC AUTO: 86.4 FL (ref 79–97)
PLATELET # BLD AUTO: 253 10*3/MM3 (ref 140–450)
PMV BLD AUTO: 9.1 FL (ref 6–12)
POTASSIUM SERPL-SCNC: 4.6 MMOL/L (ref 3.5–5.2)
PROT SERPL-MCNC: 6.8 G/DL (ref 6–8.5)
QT INTERVAL: 418 MS
QT INTERVAL: 456 MS
QTC INTERVAL: 443 MS
QTC INTERVAL: 451 MS
RBC # BLD AUTO: 4.78 10*6/MM3 (ref 3.77–5.28)
SODIUM SERPL-SCNC: 141 MMOL/L (ref 136–145)
TROPONIN T DELTA: 0 NG/L
TROPONIN T SERPL HS-MCNC: 7 NG/L
WBC NRBC COR # BLD: 6.01 10*3/MM3 (ref 3.4–10.8)

## 2023-09-23 PROCEDURE — 93010 ELECTROCARDIOGRAM REPORT: CPT | Performed by: INTERNAL MEDICINE

## 2023-09-23 PROCEDURE — G0378 HOSPITAL OBSERVATION PER HR: HCPCS

## 2023-09-23 PROCEDURE — 80053 COMPREHEN METABOLIC PANEL: CPT | Performed by: NURSE PRACTITIONER

## 2023-09-23 PROCEDURE — 82948 REAGENT STRIP/BLOOD GLUCOSE: CPT

## 2023-09-23 PROCEDURE — 63710000001 INSULIN LISPRO (HUMAN) PER 5 UNITS: Performed by: NURSE PRACTITIONER

## 2023-09-23 PROCEDURE — 83036 HEMOGLOBIN GLYCOSYLATED A1C: CPT | Performed by: NURSE PRACTITIONER

## 2023-09-23 PROCEDURE — 96375 TX/PRO/DX INJ NEW DRUG ADDON: CPT

## 2023-09-23 PROCEDURE — 96376 TX/PRO/DX INJ SAME DRUG ADON: CPT

## 2023-09-23 PROCEDURE — 83735 ASSAY OF MAGNESIUM: CPT | Performed by: NURSE PRACTITIONER

## 2023-09-23 PROCEDURE — 99213 OFFICE O/P EST LOW 20 MIN: CPT | Performed by: NURSE PRACTITIONER

## 2023-09-23 PROCEDURE — 84484 ASSAY OF TROPONIN QUANT: CPT | Performed by: NURSE PRACTITIONER

## 2023-09-23 PROCEDURE — 82248 BILIRUBIN DIRECT: CPT | Performed by: NURSE PRACTITIONER

## 2023-09-23 PROCEDURE — 85027 COMPLETE CBC AUTOMATED: CPT | Performed by: NURSE PRACTITIONER

## 2023-09-23 PROCEDURE — 93005 ELECTROCARDIOGRAM TRACING: CPT | Performed by: NURSE PRACTITIONER

## 2023-09-23 RX ORDER — ENOXAPARIN SODIUM 100 MG/ML
40 INJECTION SUBCUTANEOUS DAILY
Status: DISCONTINUED | OUTPATIENT
Start: 2023-09-23 | End: 2023-09-24 | Stop reason: HOSPADM

## 2023-09-23 RX ORDER — ACETAMINOPHEN 650 MG/1
650 SUPPOSITORY RECTAL EVERY 4 HOURS PRN
Status: DISCONTINUED | OUTPATIENT
Start: 2023-09-23 | End: 2023-09-24 | Stop reason: HOSPADM

## 2023-09-23 RX ORDER — SODIUM CHLORIDE 9 MG/ML
75 INJECTION, SOLUTION INTRAVENOUS CONTINUOUS
Status: ACTIVE | OUTPATIENT
Start: 2023-09-23 | End: 2023-09-23

## 2023-09-23 RX ORDER — HYDROXYZINE HYDROCHLORIDE 25 MG/1
25 TABLET, FILM COATED ORAL NIGHTLY PRN
Status: DISCONTINUED | OUTPATIENT
Start: 2023-09-23 | End: 2023-09-24 | Stop reason: HOSPADM

## 2023-09-23 RX ORDER — FLUTICASONE PROPIONATE 50 MCG
2 SPRAY, SUSPENSION (ML) NASAL DAILY
Status: DISCONTINUED | OUTPATIENT
Start: 2023-09-23 | End: 2023-09-24 | Stop reason: HOSPADM

## 2023-09-23 RX ORDER — SODIUM CHLORIDE 0.9 % (FLUSH) 0.9 %
10 SYRINGE (ML) INJECTION AS NEEDED
Status: DISCONTINUED | OUTPATIENT
Start: 2023-09-23 | End: 2023-09-24 | Stop reason: HOSPADM

## 2023-09-23 RX ORDER — SODIUM CHLORIDE 0.9 % (FLUSH) 0.9 %
10 SYRINGE (ML) INJECTION EVERY 12 HOURS SCHEDULED
Status: DISCONTINUED | OUTPATIENT
Start: 2023-09-23 | End: 2023-09-24 | Stop reason: HOSPADM

## 2023-09-23 RX ORDER — CETIRIZINE HYDROCHLORIDE 10 MG/1
10 TABLET ORAL DAILY
Status: DISCONTINUED | OUTPATIENT
Start: 2023-09-23 | End: 2023-09-24 | Stop reason: HOSPADM

## 2023-09-23 RX ORDER — PANTOPRAZOLE SODIUM 40 MG/10ML
40 INJECTION, POWDER, LYOPHILIZED, FOR SOLUTION INTRAVENOUS EVERY 12 HOURS SCHEDULED
Status: DISCONTINUED | OUTPATIENT
Start: 2023-09-23 | End: 2023-09-24 | Stop reason: HOSPADM

## 2023-09-23 RX ORDER — SODIUM CHLORIDE 9 MG/ML
40 INJECTION, SOLUTION INTRAVENOUS AS NEEDED
Status: DISCONTINUED | OUTPATIENT
Start: 2023-09-23 | End: 2023-09-24 | Stop reason: HOSPADM

## 2023-09-23 RX ORDER — ONDANSETRON 4 MG/1
4 TABLET, FILM COATED ORAL EVERY 6 HOURS PRN
Status: DISCONTINUED | OUTPATIENT
Start: 2023-09-23 | End: 2023-09-24 | Stop reason: HOSPADM

## 2023-09-23 RX ORDER — ONDANSETRON 2 MG/ML
4 INJECTION INTRAMUSCULAR; INTRAVENOUS EVERY 6 HOURS PRN
Status: DISCONTINUED | OUTPATIENT
Start: 2023-09-23 | End: 2023-09-24 | Stop reason: HOSPADM

## 2023-09-23 RX ORDER — ACETAMINOPHEN 325 MG/1
650 TABLET ORAL EVERY 4 HOURS PRN
Status: DISCONTINUED | OUTPATIENT
Start: 2023-09-23 | End: 2023-09-24 | Stop reason: HOSPADM

## 2023-09-23 RX ORDER — ACETAMINOPHEN 160 MG/5ML
650 SOLUTION ORAL EVERY 4 HOURS PRN
Status: DISCONTINUED | OUTPATIENT
Start: 2023-09-23 | End: 2023-09-24 | Stop reason: HOSPADM

## 2023-09-23 RX ORDER — LOSARTAN POTASSIUM 50 MG/1
100 TABLET ORAL DAILY
Status: DISCONTINUED | OUTPATIENT
Start: 2023-09-23 | End: 2023-09-24 | Stop reason: HOSPADM

## 2023-09-23 RX ORDER — SODIUM CHLORIDE, SODIUM LACTATE, POTASSIUM CHLORIDE, CALCIUM CHLORIDE 600; 310; 30; 20 MG/100ML; MG/100ML; MG/100ML; MG/100ML
30 INJECTION, SOLUTION INTRAVENOUS CONTINUOUS PRN
Status: CANCELLED | OUTPATIENT
Start: 2023-09-23

## 2023-09-23 RX ADMIN — PANTOPRAZOLE SODIUM 40 MG: 40 INJECTION, POWDER, LYOPHILIZED, FOR SOLUTION INTRAVENOUS at 11:22

## 2023-09-23 RX ADMIN — PANTOPRAZOLE SODIUM 40 MG: 40 INJECTION, POWDER, LYOPHILIZED, FOR SOLUTION INTRAVENOUS at 21:21

## 2023-09-23 RX ADMIN — Medication 10 ML: at 21:21

## 2023-09-23 RX ADMIN — FLUTICASONE PROPIONATE 2 SPRAY: 50 SPRAY, METERED NASAL at 08:28

## 2023-09-23 RX ADMIN — ACETAMINOPHEN 650 MG: 325 TABLET ORAL at 03:33

## 2023-09-23 RX ADMIN — LOSARTAN POTASSIUM 100 MG: 50 TABLET, FILM COATED ORAL at 08:29

## 2023-09-23 RX ADMIN — SODIUM CHLORIDE 75 ML/HR: 9 INJECTION, SOLUTION INTRAVENOUS at 03:29

## 2023-09-23 RX ADMIN — CETIRIZINE HYDROCHLORIDE 10 MG: 10 TABLET, FILM COATED ORAL at 08:29

## 2023-09-23 RX ADMIN — Medication 10 ML: at 08:29

## 2023-09-23 RX ADMIN — INSULIN LISPRO 4 UNITS: 100 INJECTION, SOLUTION INTRAVENOUS; SUBCUTANEOUS at 21:21

## 2023-09-23 RX ADMIN — PANCRELIPASE 12000 UNITS OF LIPASE: 60000; 12000; 38000 CAPSULE, DELAYED RELEASE PELLETS ORAL at 18:28

## 2023-09-23 RX ADMIN — INSULIN LISPRO 2 UNITS: 100 INJECTION, SOLUTION INTRAVENOUS; SUBCUTANEOUS at 18:28

## 2023-09-23 NOTE — PLAN OF CARE
Problem: Adult Inpatient Plan of Care  Goal: Plan of Care Review  Outcome: Ongoing, Progressing  Goal: Patient-Specific Goal (Individualized)  Outcome: Ongoing, Progressing  Goal: Absence of Hospital-Acquired Illness or Injury  Outcome: Ongoing, Progressing  Goal: Optimal Comfort and Wellbeing  Outcome: Ongoing, Progressing  Goal: Readiness for Transition of Care  Outcome: Ongoing, Progressing  Intervention: Mutually Develop Transition Plan  Recent Flowsheet Documentation  Taken 9/23/2023 0059 by Ailyn Garcia, RN  Transportation Anticipated: family or friend will provide  Patient/Family Anticipated Services at Transition: none  Patient/Family Anticipates Transition to: home with family  Taken 9/23/2023 0058 by Ailyn Garcia, RN  Equipment Currently Used at Home: none     Problem: Pain Acute  Goal: Acceptable Pain Control and Functional Ability  Outcome: Ongoing, Progressing     Problem: Fall Injury Risk  Goal: Absence of Fall and Fall-Related Injury  Outcome: Ongoing, Progressing     Problem: Nausea and Vomiting  Goal: Fluid and Electrolyte Balance  Outcome: Ongoing, Progressing   Goal Outcome Evaluation:      Pain control   Activity encouraged  A&O x4  2L via nasal cannula   Sinus cristal to NSR

## 2023-09-23 NOTE — H&P
"    [facility] Medicine Services  HISTORY AND PHYSICAL    Patient Name: Ava Mota  : 1955  MRN: [id number]  Primary Care Physician: Liliane Hooper MD  Date of admission: 2023    Subjective   Subjective     Chief Complaint:  Epigastric pain     HPI:  Ava Mota is a 68 y.o. female w/ a hx of breast cancer, HTN, T2DM, GERD who presented to the ED w/ c/o epigastric pain.   Pt reports that she developed a mid back pain ~3-4 days ago. Pain described as a \"dull, constant ache\". Pt then developed epigastric/upper abdominal pain 2 days ago. She has had nausea but no vomiting. Describes pain and \"constant and dull\". Pain becomes significantly worse about 20 minutes after oral intake and pt describes bloating. Yesterday pt developed diarrhea. Since onset she has had ~6-7 episodes.   Pt denies fever/chills, chest pain, cough, dyspnea, dysuria, edema.   Pt evaluated in the ED. CT abd/pel and CT Chest both unremarkable. V/Q scan unremarkable. Initial troponin WNL, repeat 12; EKG stable. Pt noted to be 89% on room air in the ED and placed on 2 liters. Pt admitted to the hospital medicine service for further evaluation.     Review of Systems   Constitutional: Negative.  Negative for chills, diaphoresis, fatigue and fever.   HENT: Negative.  Negative for congestion, postnasal drip, rhinorrhea, sinus pressure, sinus pain, sneezing, sore throat and trouble swallowing.    Eyes: Negative.  Negative for visual disturbance.   Respiratory: Negative.  Negative for cough, shortness of breath and wheezing.    Cardiovascular: Negative.  Negative for chest pain, palpitations and leg swelling.   Gastrointestinal:  Positive for abdominal distention, abdominal pain, diarrhea and nausea. Negative for blood in stool, constipation and vomiting.   Endocrine: Negative.    Genitourinary: Negative.  Negative for decreased urine volume, difficulty urinating, dysuria, flank pain, frequency, " hematuria, pelvic pain and urgency.   Musculoskeletal:  Positive for back pain. Negative for arthralgias, myalgias, neck pain and neck stiffness.   Skin: Negative.  Negative for wound.   Allergic/Immunologic: Negative.  Negative for immunocompromised state.   Neurological: Negative.  Negative for dizziness, tremors, seizures, syncope, facial asymmetry, speech difficulty, weakness, light-headedness, numbness and headaches.   Hematological: Negative.  Does not bruise/bleed easily.   Psychiatric/Behavioral: Negative.  Negative for confusion.    All other systems reviewed and are negative.     Personal History     Past Medical History:   Diagnosis Date    Breast cancer     LT FEB 2019    Diabetes mellitus     Drug therapy     ENDED MAY 2019    Hx of radiation therapy     ENDED AUG 2019.    Hypertension        Oncology Problem List:  Malignant neoplasm of upper-outer quadrant of left breast in female,   estrogen receptor negative (02/15/2019; Status: Active)    Oncology/Hematology History   Malignant neoplasm of upper-outer quadrant of left breast in female, estrogen receptor negative   1/15/2019 Initial Diagnosis    Malignant neoplasm of upper-outer quadrant of left breast in female, estrogen receptor negative (CMS/HCC)  Abnormal mammogram and a biopsy-proven poorly differentiated infiltrating ductal carcinoma in the left 3 o'clock position measuring 6 mm.            2/8/2019 Surgery    Surgery  Lumpectomy by Dr. PATINO on 2/8/2019.   Grade 3, high grade 6 mm tumor with margins negative by 2 mm.    Tumor was ER/TX-negative HER-2/jakob negative and 0 of 1 lymph node was positive.             3/5/2019 - 5/9/2019 Chemotherapy    OP BREAST TC DOCEtaxel / Cyclophosphamide  Start date 03/07/2019 pending.   Education 03/05/2019  Completed 05/09/2019 6/19/2019 - 7/31/2019 Radiation    Radiation OncologyTreatment Course:  Ava Mota received 5500 cGy in 30 fractions to left breast via External Beam Radiation - EBRT.       Past  Surgical History:   Procedure Laterality Date    BREAST BIOPSY Left     2019    BREAST LUMPECTOMY Left     2019    CARPAL TUNNEL RELEASE Left 2015     SECTION      x2    CHOLECYSTECTOMY  1973    COLONOSCOPY  2013    KNEE ARTHROPLASTY Bilateral      &     TUBAL ABDOMINAL LIGATION  1985     Family History: family history includes Breast cancer (age of onset: 48) in her sister; Colon cancer in her sister; Diabetes in her mother; Emphysema in her father; Heart disease in her mother; Lung cancer in her brother; Stroke in her mother.     Social History:  reports that she quit smoking about 51 years ago. Her smoking use included cigarettes. She has a 1.00 pack-year smoking history. She has never used smokeless tobacco. She reports that she does not drink alcohol and does not use drugs.  Social History     Social History Narrative    Not on file     Medications:  Biotin, Insulin NPH Isophane & Regular, cetirizine, docusate sodium, ezetimibe, fluticasone, glucose blood, hydrOXYzine pamoate, lisinopril, losartan, omeprazole, pancrelipase (Lip-Prot-Amyl), and valsartan    Allergies   Allergen Reactions    Iodine Anaphylaxis     PT STATES SHE 'PASSED OUT ' AFTER CT CONTRAST.     Dapagliflozin Unknown - Low Severity     severe yeast infection     Objective   Objective     Vital Signs:   Temp:  [97.7 °F (36.5 °C)] 97.7 °F (36.5 °C)  Heart Rate:  [65-74] 65  Resp:  [18] 18  BP: (139-182)/(68-83) 139/68  Flow (L/min):  [2] 2    Physical Exam     Constitutional: Awake, alert; non-toxic appearing   Eyes: PERRLA, sclerae anicteric, no conjunctival injection  HENT: NCAT, mucous membranes moist  Neck: Supple, no thyromegaly, no lymphadenopathy, trachea midline  Respiratory: Clear to auscultation bilaterally, nonlabored respirations   Cardiovascular: RRR, no murmurs, rubs, or gallops, no peripheral edema   Gastrointestinal: Positive bowel sounds, soft, non-distended; epigastric region-TTP   Musculoskeletal:  Normal ROM bilaterally   Psychiatric: Appropriate affect, cooperative  Neurologic: Oriented x 3, strength symmetric in all extremities, Cranial Nerves grossly intact to confrontation, speech clear  Skin: No rashes, lesions or wounds     Result Review:  I have personally reviewed the results from the time of this admission to 9/23/2023 00:07 EDT and agree with these findings:  [x]  Laboratory list / accordion  []  Microbiology  [x]  Radiology  [x]  EKG/Telemetry   []  Cardiology/Vascular   []  Pathology  [x]  Old records    LAB RESULTS:      Lab 09/22/23  1848   WBC 5.43   HEMOGLOBIN 14.2   HEMATOCRIT 44.1   PLATELETS 281   NEUTROS ABS 2.96   IMMATURE GRANS (ABS) 0.04   LYMPHS ABS 1.77   MONOS ABS 0.49   EOS ABS 0.13   MCV 86.6   LACTATE 1.9   D DIMER QUANT 0.79*         Lab 09/22/23  1848   SODIUM 138   POTASSIUM 4.2   CHLORIDE 102   CO2 26.0   ANION GAP 10.0   BUN 15   CREATININE 0.70   EGFR 93.2   GLUCOSE 222*   CALCIUM 8.9         Lab 09/22/23  1848   TOTAL PROTEIN 7.0   ALBUMIN 3.8   GLOBULIN 3.2   ALT (SGPT) 20   AST (SGOT) 20   BILIRUBIN 1.3*   ALK PHOS 131*   LIPASE 22         Lab 09/22/23 2047 09/22/23  1848   PROBNP  --  36.5   HSTROP T 12* 8                 Brief Urine Lab Results  (Last result in the past 365 days)        Color   Clarity   Blood   Leuk Est   Nitrite   Protein   CREAT   Urine HCG        09/22/23 1850 Yellow   Clear   Negative   Small (1+)   Negative   Negative                 Microbiology Results (last 10 days)       Procedure Component Value - Date/Time    COVID PRE-OP / PRE-PROCEDURE SCREENING ORDER (NO ISOLATION) - Swab, Nasopharynx [[id number]]  (Normal) Collected: 09/22/23 2112    Lab Status: Final result Specimen: Swab from Nasopharynx Updated: 09/22/23 2209    Narrative:      The following orders were created for panel order COVID PRE-OP / PRE-PROCEDURE SCREENING ORDER (NO ISOLATION) - Swab, Nasopharynx.  Procedure                               Abnormality         Status                      ---------                               -----------         ------                     Respiratory Panel PCR w/...[[id number]]  Normal              Final result                 Please view results for these tests on the individual orders.    Respiratory Panel PCR w/COVID-19(SARS-CoV-2) [facility] In-House, NP Swab in UTM/VTM, 3-4 HR TAT - Swab, Nasopharynx [[id number]]  (Normal) Collected: 09/22/23 2112    Lab Status: Final result Specimen: Swab from Nasopharynx Updated: 09/22/23 2209     ADENOVIRUS, PCR Not Detected     Coronavirus 229E Not Detected     Coronavirus HKU1 Not Detected     Coronavirus NL63 Not Detected     Coronavirus OC43 Not Detected     COVID19 Not Detected     Human Metapneumovirus Not Detected     Human Rhinovirus/Enterovirus Not Detected     Influenza A PCR Not Detected     Influenza B PCR Not Detected     Parainfluenza Virus 1 Not Detected     Parainfluenza Virus 2 Not Detected     Parainfluenza Virus 3 Not Detected     Parainfluenza Virus 4 Not Detected     RSV, PCR Not Detected     Bordetella pertussis pcr Not Detected     Bordetella parapertussis PCR Not Detected     Chlamydophila pneumoniae PCR Not Detected     Mycoplasma pneumo by PCR Not Detected    Narrative:      In the setting of a positive respiratory panel with a viral infection PLUS a negative procalcitonin without other underlying concern for bacterial infection, consider observing off antibiotics or discontinuation of antibiotics and continue supportive care. If the respiratory panel is positive for atypical bacterial infection (Bordetella pertussis, Chlamydophila pneumoniae, or Mycoplasma pneumoniae), consider antibiotic de-escalation to target atypical bacterial infection.          CT Abdomen Pelvis Without Contrast    Result Date: 9/22/2023  CT ABDOMEN PELVIS WO CONTRAST Date of Exam: 9/22/2023 7:55 PM EDT Indication: upper abd pain. right flank pain.. Comparison: 3/24/2021 Technique: Axial CT images were  obtained of the abdomen and pelvis without the administration of contrast. Reconstructed coronal and sagittal images were also obtained. Automated exposure control and iterative construction methods were used. Findings: *Lower Thorax: Lung bases are clear. *Liver: Unremarkable. *Gallbladder: Normal gallbladder. *Pancreas: Normal. *Spleen: Normal. *Adrenal Glands: Normal. *Kidneys: 3 mm nonobstructing right renal stone. No renal stones or hydronephrosis on left. *Stomach:Unremarkable. *Bowel: Nonobstructive bowel gas pattern. No bowel wall inflammation. *Appendix: Normal appendix. *Peritoneal Cavity: No pneumoperitoneum.  No lymphadenopathy. *Urinary Bladder: Unremarkable. *Pelvis: No free pelvic fluid. *Bones: No acute fractures or dislocations. No osseous destructive lesions. *     Impression: 1.3 mm nonobstructing right renal stone 2.Small hiatal hernia. Electronically Signed: Ricardo Oneal MD  9/22/2023 8:17 PM EDT  Workstation ID: [id number]    CT Chest Without Contrast Diagnostic    Result Date: 9/22/2023  CT CHEST WO CONTRAST DIAGNOSTIC Date of Exam: 9/22/2023 7:55 PM EDT Indication: mid back and right flank pain. Comparison: Chest x-ray 9/22/2023 Technique: Axial CT images were obtained of the chest without contrast administration.  Reconstructed coronal and sagittal images were also obtained. Automated exposure control and iterative construction methods were used. Findings: *Heart: Heart size normal without pericardial effusion. *Lymphadenopathy: No thoracic lymphadenopathy. *Lungs: No focal consolidation. No pleural effusions. *Pneumothorax: None. *Bones: No acute fractures or dislocations. No osseous destructive lesions. * *    Impression: 1.No acute findings. Electronically Signed: Ricardo Oneal MD  9/22/2023 8:28 PM EDT  Workstation ID: [id number]    NM Lung Scan Perfusion Particulate    Result Date: 9/22/2023  DATE OF EXAM: 9/22/2023 10:09 PM EDT PROCEDURE: NM LUNG SCAN PERFUSION PARTICULATE INDICATIONS:  cp, elev dimer, sob COMPARISON: 9/22/2023. TECHNIQUE: 5.0 mCi of technetium 99m labeled MAA was administered intravenously for the perfusion portion of the pulmonary exam. Scintigraphic images of the lungs were obtained in multiple projections to assess perfusion. FINDINGS: Homogeneous perfusion present. No peripheral or wedge-shaped defects identified.     Impression: Normal perfusion. No suspicious peripheral or wedge-shaped defects identified. Electronically Signed: Sneha South MD  9/22/2023 10:56 PM EDT  Workstation ID: [id number]    XR Chest 1 View    Result Date: 9/22/2023  XR CHEST 1 VW Date of Exam: 9/22/2023 6:52 PM EDT Indication: cp. back pain. Comparison: None available. Findings: Heart size and pulmonary vessels are within normal limits. The lungs are clear bilaterally. There are no pleural effusions. No evidence pneumothorax. Bony structures are unremarkable.     Impression: Impression: 1. No acute cardiopulmonary disease. Electronically Signed: Seth Guerra MD  9/22/2023 7:16 PM EDT  Workstation ID: [id number]       Assessment & Plan   Assessment & Plan       Epigastric pain    History of breast cancer    T2DM (type 2 diabetes mellitus)    HTN (hypertension)    Hypoxia    Pyuria    Kidney stone    GERD (gastroesophageal reflux disease)    Ava Mota is a 68 y.o. female w/ a hx of breast cancer, HTN, T2DM, GERD who presented to the ED w/ c/o epigastric pain.     **Epigastric pain   **Hx of GERD  -w/ c/o nausea, diarrhea, bloating and worsening pain after oral intake   -CT abd/pel unremarkable  -pt reports colonoscopy/EGD ~1-2 years ago (reflux)   -lactic acid and WBC both WNL  -T.Bili slightly up at 1.3 (previously 1.6 in 2021-?)   -respiratory panel pcr negative   -GI panel PCR pending  -NS @ 75ml/hour x 12 hours   -clear liquid diet- advance as tolerated   -IV Protonix BID   -symptom mgt  -consider GI consult if symptoms persist     **Hypoxia   -unclear etiology   -pt noted to be 89% on room air in  ED (suspect possibly from IV Dilaudid-?)   -currently on 1.5-2 liters NC   -D.Dimer elevated but pt w/ contrast allergy; V/Q scan obtained and unremarkable   -CT Chest without contrast unremarkable   -initial troponin WNL; repeat 12- trend until peak   -EKG stable; repeat in am   -pt denies respiratory symptoms  -respiratory panel pcr negative   -monitor      **Non-obstructing kidney stone   -CT abd/pel- 3 mm nonobstructing right renal stone   -pt w/ c/o mid back pain but denies dysuria, flank or low back pain   -UA w/ WBCs; urine culture pending (hx of recurrent UTIs, last UTI about 5 weeks ago)   -stable     **T2DM  -hem A1c pending   -FSBG q ac/hs w/ LDSS   -holding Novolog for now until tolerating oral intake     **HTN   -awaiting med rec verification; will restart BP meds after list clarified    **Hx of breast cancer     DVT prophylaxis:  Lovenox     CODE STATUS:    Code Status (Patient has no pulse and is not breathing): CPR (Attempt to Resuscitate)  Medical Interventions (Patient has pulse or is breathing): Full Support    Expected Discharge  Expected Discharge Date: 9/23/2023; Expected Discharge Time:     Signature: Electronically signed by PARDEEP Ochoa, 09/23/23, 12:07 AM EDT.    Time spent: 55 minutes           The patient was seen independently by the APC.  I was available for any questions or concerns.     Electronically signed by Darci Brown III, DO, 09/23/23, 1:20 AM EDT.

## 2023-09-23 NOTE — PROGRESS NOTES
[facility] Medicine Services  PROGRESS NOTE    Patient Name: Ava Mota  : 1955  MRN: [id number]    Date of Admission: 2023  Primary Care Physician: Tara Nunes, Liliane Kevin MD    Subjective   Subjective     CC:  Abd pain    HPI:  No acute events. States her pain is a bit better but still present. Dull constant. Worse with food.       Objective   Objective     Vital Signs:   Temp:  [97.5 °F (36.4 °C)-97.7 °F (36.5 °C)] 97.7 °F (36.5 °C)  Heart Rate:  [60-74] 62  Resp:  [16-18] 16  BP: (134-182)/(66-83) 139/75  Flow (L/min):  [2] 2     Physical Exam:  Constitutional: No acute distress, awake, alert  HENT: NCAT, mucous membranes moist  Respiratory: Clear to auscultation bilaterally, respiratory effort normal   Cardiovascular: RRR, no murmurs, rubs, or gallops  Gastrointestinal: Positive bowel sounds, soft, tenderness mid/epigastic; neg murphys   Musculoskeletal: No bilateral ankle edema  Psychiatric: Appropriate affect, cooperative  Neurologic: Oriented x 3, strength symmetric in all extremities, Cranial Nerves grossly intact to confrontation, speech clear  Skin: No rashes      Results Reviewed:  LAB RESULTS:      Lab 23   WBC 6.01 5.43   HEMOGLOBIN 13.5 14.2   HEMATOCRIT 41.3 44.1   PLATELETS 253 281   NEUTROS ABS  --  2.96   IMMATURE GRANS (ABS)  --  0.04   LYMPHS ABS  --  1.77   MONOS ABS  --  0.49   EOS ABS  --  0.13   MCV 86.4 86.6   LACTATE  --  1.9   D DIMER QUANT  --  0.79*         Lab 23   SODIUM 141 138   POTASSIUM 4.6 4.2   CHLORIDE 104 102   CO2 25.0 26.0   ANION GAP 12.0 10.0   BUN 17 15   CREATININE 0.94 0.70   EGFR 65.4 93.2   GLUCOSE 181* 222*   CALCIUM 8.8 8.9   MAGNESIUM 2.3  --    HEMOGLOBIN A1C 8.70*  --          Lab 23   TOTAL PROTEIN 6.8 7.0   ALBUMIN 3.7 3.8   GLOBULIN 3.1 3.2   ALT (SGPT) 27 20   AST (SGOT) 32 20   BILIRUBIN 1.4* 1.3*   ALK PHOS 126* 131*   LIPASE   --  22         Lab 09/23/23  0431 09/23/23  0237 09/22/23  2047 09/22/23  1848   PROBNP  --   --   --  36.5   HSTROP T 7 7 12* 8                 Brief Urine Lab Results  (Last result in the past 365 days)        Color   Clarity   Blood   Leuk Est   Nitrite   Protein   CREAT   Urine HCG        09/22/23 1850 Yellow   Clear   Negative   Small (1+)   Negative   Negative                   Microbiology Results Abnormal       Procedure Component Value - Date/Time    COVID PRE-OP / PRE-PROCEDURE SCREENING ORDER (NO ISOLATION) - Swab, Nasopharynx [[id number]]  (Normal) Collected: 09/22/23 2112    Lab Status: Final result Specimen: Swab from Nasopharynx Updated: 09/22/23 2209    Narrative:      The following orders were created for panel order COVID PRE-OP / PRE-PROCEDURE SCREENING ORDER (NO ISOLATION) - Swab, Nasopharynx.  Procedure                               Abnormality         Status                     ---------                               -----------         ------                     Respiratory Panel PCR w/...[[id number]]  Normal              Final result                 Please view results for these tests on the individual orders.    Respiratory Panel PCR w/COVID-19(SARS-CoV-2) [facility] In-House, NP Swab in UTM/VTM, 3-4 HR TAT - Swab, Nasopharynx [[id number]]  (Normal) Collected: 09/22/23 2112    Lab Status: Final result Specimen: Swab from Nasopharynx Updated: 09/22/23 2209     ADENOVIRUS, PCR Not Detected     Coronavirus 229E Not Detected     Coronavirus HKU1 Not Detected     Coronavirus NL63 Not Detected     Coronavirus OC43 Not Detected     COVID19 Not Detected     Human Metapneumovirus Not Detected     Human Rhinovirus/Enterovirus Not Detected     Influenza A PCR Not Detected     Influenza B PCR Not Detected     Parainfluenza Virus 1 Not Detected     Parainfluenza Virus 2 Not Detected     Parainfluenza Virus 3 Not Detected     Parainfluenza Virus 4 Not Detected     RSV, PCR Not Detected      Bordetella pertussis pcr Not Detected     Bordetella parapertussis PCR Not Detected     Chlamydophila pneumoniae PCR Not Detected     Mycoplasma pneumo by PCR Not Detected    Narrative:      In the setting of a positive respiratory panel with a viral infection PLUS a negative procalcitonin without other underlying concern for bacterial infection, consider observing off antibiotics or discontinuation of antibiotics and continue supportive care. If the respiratory panel is positive for atypical bacterial infection (Bordetella pertussis, Chlamydophila pneumoniae, or Mycoplasma pneumoniae), consider antibiotic de-escalation to target atypical bacterial infection.            CT Abdomen Pelvis Without Contrast    Result Date: 9/22/2023  CT ABDOMEN PELVIS WO CONTRAST Date of Exam: 9/22/2023 7:55 PM EDT Indication: upper abd pain. right flank pain.. Comparison: 3/24/2021 Technique: Axial CT images were obtained of the abdomen and pelvis without the administration of contrast. Reconstructed coronal and sagittal images were also obtained. Automated exposure control and iterative construction methods were used. Findings: *Lower Thorax: Lung bases are clear. *Liver: Unremarkable. *Gallbladder: Normal gallbladder. *Pancreas: Normal. *Spleen: Normal. *Adrenal Glands: Normal. *Kidneys: 3 mm nonobstructing right renal stone. No renal stones or hydronephrosis on left. *Stomach:Unremarkable. *Bowel: Nonobstructive bowel gas pattern. No bowel wall inflammation. *Appendix: Normal appendix. *Peritoneal Cavity: No pneumoperitoneum.  No lymphadenopathy. *Urinary Bladder: Unremarkable. *Pelvis: No free pelvic fluid. *Bones: No acute fractures or dislocations. No osseous destructive lesions. *     Impression: 1.3 mm nonobstructing right renal stone 2.Small hiatal hernia. Electronically Signed: Ricardo Oneal MD  9/22/2023 8:17 PM EDT  Workstation ID: [id number]    CT Chest Without Contrast Diagnostic    Result Date: 9/22/2023  CT CHEST WO  CONTRAST DIAGNOSTIC Date of Exam: 9/22/2023 7:55 PM EDT Indication: mid back and right flank pain. Comparison: Chest x-ray 9/22/2023 Technique: Axial CT images were obtained of the chest without contrast administration.  Reconstructed coronal and sagittal images were also obtained. Automated exposure control and iterative construction methods were used. Findings: *Heart: Heart size normal without pericardial effusion. *Lymphadenopathy: No thoracic lymphadenopathy. *Lungs: No focal consolidation. No pleural effusions. *Pneumothorax: None. *Bones: No acute fractures or dislocations. No osseous destructive lesions. * *    Impression: 1.No acute findings. Electronically Signed: Ricardo Oneal MD  9/22/2023 8:28 PM EDT  Workstation ID: [id number]    NM Lung Scan Perfusion Particulate    Result Date: 9/22/2023  DATE OF EXAM: 9/22/2023 10:09 PM EDT PROCEDURE: NM LUNG SCAN PERFUSION PARTICULATE INDICATIONS: cp, elev dimer, sob COMPARISON: 9/22/2023. TECHNIQUE: 5.0 mCi of technetium 99m labeled MAA was administered intravenously for the perfusion portion of the pulmonary exam. Scintigraphic images of the lungs were obtained in multiple projections to assess perfusion. FINDINGS: Homogeneous perfusion present. No peripheral or wedge-shaped defects identified.     Impression: Normal perfusion. No suspicious peripheral or wedge-shaped defects identified. Electronically Signed: Sneha South MD  9/22/2023 10:56 PM EDT  Workstation ID: [id number]    XR Chest 1 View    Result Date: 9/22/2023  XR CHEST 1 VW Date of Exam: 9/22/2023 6:52 PM EDT Indication: cp. back pain. Comparison: None available. Findings: Heart size and pulmonary vessels are within normal limits. The lungs are clear bilaterally. There are no pleural effusions. No evidence pneumothorax. Bony structures are unremarkable.     Impression: Impression: 1. No acute cardiopulmonary disease. Electronically Signed: Seth Guerra MD  9/22/2023 7:16 PM EDT  Workstation ID: [id number]          Current medications:  Scheduled Meds:cetirizine, 10 mg, Oral, Daily  enoxaparin, 40 mg, Subcutaneous, Daily  fluticasone, 2 spray, Each Nare, Daily  insulin lispro, 2-7 Units, Subcutaneous, 4x Daily AC & at Bedtime  losartan, 100 mg, Oral, Daily  pancrelipase (Lip-Prot-Amyl), 12,000 units of lipase, Oral, TID With Meals  pantoprazole, 40 mg, Intravenous, Q12H  sodium chloride, 10 mL, Intravenous, Q12H      Continuous Infusions:sodium chloride, 75 mL/hr, Last Rate: 75 mL/hr (09/23/23 0329)      PRN Meds:.  acetaminophen **OR** acetaminophen **OR** acetaminophen    dextrose    dextrose    glucagon (human recombinant)    hydrOXYzine    ondansetron **OR** ondansetron    sodium chloride    sodium chloride    sodium chloride    Assessment & Plan   Assessment & Plan     Active Hospital Problems    Diagnosis  POA    **Epigastric pain [R10.13]  Yes    HTN (hypertension) [I10]  Yes    Hypoxia [R09.02]  Yes    Pyuria [R82.81]  Yes    Kidney stone [N20.0]  Yes    GERD (gastroesophageal reflux disease) [K21.9]  Yes    T2DM (type 2 diabetes mellitus) [E11.9]  Yes    History of breast cancer [Z85.3]  Not Applicable      Resolved Hospital Problems   No resolved problems to display.        Brief Hospital Course to date:  Ava Mota is a 68 y.o. female w/ a hx of breast cancer, HTN, T2DM, GERD who presented to the ED w/ c/o epigastric pain.      Epigastric pain   Hx of GERD  H/o pancreatic insufficiency   Elevated bili   -w/ c/o nausea, diarrhea, bloating and worsening pain after oral intake   -CT abd/pel unremarkable  -pt reports colonoscopy/EGD ~1-2 years ago (reflux). Improved with 14 days of PPI   -T.Bili slightly up at 1.4 (previously 1.6 in 2021-?)   -respiratory panel pcr negative   -GI panel PCR pending  -NS @ 75ml/hour x 12 hours   -clear liquid diet- advance as tolerated   -IV Protonix BID   - Still with pain but improved a bit. GI consulted     Back pain  - right paraspinals. Started after epigastric pain.  Symptomatic relief      Hypoxia   -unclear etiology   -pt noted to be 89% on room air in ED (suspect possibly from IV Dilaudid-?)   -currently on 1.5-2 liters NC   -D.Dimer elevated but pt w/ contrast allergy; V/Q scan obtained and unremarkable   -CT Chest without contrast unremarkable   -initial troponin WNL  -EKG reviewed  - mobilize. Wean O2 as tolerated      Non-obstructing kidney stone   -CT abd/pel- 3 mm nonobstructing right renal stone   -pt w/ c/o mid back pain but denies dysuria, flank or low back pain   -UA w/ WBCs; urine culture pending (hx of recurrent UTIs, last UTI about 5 weeks ago)      T2DM  -hem A1c 8.7  -FSBG q ac/hs w/ LDSS   -holding Novolog for now until tolerating oral intake      HTN   - losartan 100      Hx of breast cancer     Expected Discharge Location and Transportation: likely home  Expected Discharge   Expected Discharge Date: 9/23/2023; Expected Discharge Time:      DVT prophylaxis:  Medical DVT prophylaxis orders are present.          CODE STATUS:   Code Status and Medical Interventions:   Ordered at: 09/22/23 6978     Code Status (Patient has no pulse and is not breathing):    CPR (Attempt to Resuscitate)     Medical Interventions (Patient has pulse or is breathing):    Full Support       Priscilla Escalera, DO  09/23/23

## 2023-09-23 NOTE — CONSULTS
[facility] [place] [facility] Consult    Referring Provider: No ref. provider found    PCP: Tara Nunes, Liliane Kevin MD    Reason for Consultation: Epigastric pain, pancreas insufficiency, elevated bilirubin    Chief complaint: Abdominal pain    History of present illness:    Ava Mota is a 68 y.o. female who is admitted with worsening epigastric abdominal pain for the past week or so.  Patient reports that the pain is in her epigastrium but does extend across her abdomen into her lower back.  Reports pain is worse immediately following meals.  Has had some nausea without vomiting since onset; denies any SOB, CP, or F/C.  Has had some intermittent diarrhea.  Patient also reports history of exocrine pancreas insufficiency which is managed with Creon at home.  Denies any GERD or dyspeptic-like symptoms.  Denies any use of NSAIDs and is not anticoagulated.  Imaging obtained at time of admission unremarkable.  Transaminases WNL; borderline alk phos and bilirubin of 1.4. Past medical, surgical, social, and family histories are reviewed for accuracy.  No documented alleviating or exacerbating factors.  Does not endorse pain at time of exam.    Allergies:  Iodine and Dapagliflozin    Scheduled Meds:  cetirizine, 10 mg, Oral, Daily  enoxaparin, 40 mg, Subcutaneous, Daily  fluticasone, 2 spray, Each Nare, Daily  insulin lispro, 2-7 Units, Subcutaneous, 4x Daily AC & at Bedtime  losartan, 100 mg, Oral, Daily  pancrelipase (Lip-Prot-Amyl), 12,000 units of lipase, Oral, TID With Meals  pantoprazole, 40 mg, Intravenous, Q12H  sodium chloride, 10 mL, Intravenous, Q12H         Infusions:  sodium chloride, 75 mL/hr, Last Rate: 75 mL/hr (09/23/23 0329)        PRN Meds:    acetaminophen **OR** acetaminophen **OR** acetaminophen    dextrose    dextrose    glucagon (human recombinant)    hydrOXYzine    ondansetron **OR** ondansetron    sodium chloride    sodium chloride    sodium chloride    Home Meds:  Medications Prior to  Admission   Medication Sig Dispense Refill Last Dose    Biotin 5000 MCG tablet Take 1 tablet by mouth Daily.   9/22/2023 at 0800    cetirizine (zyrTEC) 10 MG tablet Take  by mouth Daily.   9/22/2023    Creon 48325-37963 units capsule delayed-release particles capsule TAKE 2 BY MOUTH BEFORE EVERY MEAL AND TAKE 1 CAPSULE BY MOUTH WITH A SNACK   9/22/2023    ezetimibe (ZETIA) 10 MG tablet Take 1 tablet by mouth Daily.   9/22/2023    Insulin NPH Isophane & Regular (NOVOLIN 70/30 SC) Inject  under the skin into the appropriate area as directed.   9/22/2023    lisinopril (PRINIVIL,ZESTRIL) 5 MG tablet Take 1 tablet by mouth Daily.   9/22/2023    losartan (COZAAR) 100 MG tablet Take 1 tablet by mouth Daily.   9/22/2023    ONE TOUCH ULTRA TEST test strip 3 (Three) Times a Day. for testing   9/22/2023    docusate sodium (COLACE) 100 MG capsule Take 1 capsule by mouth 2 (Two) Times a Day.   Unknown    fluticasone (FLONASE) 50 MCG/ACT nasal spray   0 Unknown    hydrOXYzine pamoate (VISTARIL) 25 MG capsule Take 1 capsule by mouth every night at bedtime.   Unknown    omeprazole (priLOSEC) 40 MG capsule Take 40 mg by mouth Daily. (Patient not taking: Reported on 9/23/2023)   Not Taking    valsartan (DIOVAN) 320 MG tablet Take 1 tablet by mouth Daily.          ROS: Review of Systems   Constitutional:  Positive for activity change, appetite change and fatigue. Negative for chills, diaphoresis, fever and unexpected weight change.   HENT:  Negative for sore throat, trouble swallowing and voice change.    Eyes: Negative.    Respiratory:  Negative for apnea, cough, choking, chest tightness, shortness of breath, wheezing and stridor.    Cardiovascular:  Negative for chest pain, palpitations and leg swelling.   Gastrointestinal:  Positive for abdominal pain and nausea. Negative for abdominal distention, anal bleeding, blood in stool, constipation, diarrhea, rectal pain and vomiting.   Endocrine: Negative.    Genitourinary: Negative.   "  Musculoskeletal: Negative.    Skin: Negative.    Allergic/Immunologic: Negative.    Neurological: Negative.    Hematological: Negative.    Psychiatric/Behavioral: Negative.     All other systems reviewed and are negative.    PAST MED HX:  Past Medical History:   Diagnosis Date    Breast cancer     LT 2019    Diabetes mellitus     Drug therapy     ENDED MAY 2019    Hx of radiation therapy     ENDED AUG 2019.    Hypertension        PAST SURG HX:  Past Surgical History:   Procedure Laterality Date    BREAST BIOPSY Left     2019    BREAST LUMPECTOMY Left     2019    CARPAL TUNNEL RELEASE Left 2015     SECTION      x2    CHOLECYSTECTOMY  1973    COLONOSCOPY  2013    KNEE ARTHROPLASTY Bilateral      &     TUBAL ABDOMINAL LIGATION  1985       FAM HX:  Family History   Problem Relation Age of Onset    Diabetes Mother     Heart disease Mother     Stroke Mother     Emphysema Father     Breast cancer Sister 48    Colon cancer Sister     Lung cancer Brother     Ovarian cancer Neg Hx        SOC HX:  Social History     Socioeconomic History    Marital status:    Tobacco Use    Smoking status: Former     Packs/day: 0.50     Years: 2.00     Pack years: 1.00     Types: Cigarettes     Quit date:      Years since quittin.7    Smokeless tobacco: Never   Vaping Use    Vaping Use: Never used   Substance and Sexual Activity    Alcohol use: No    Drug use: No    Sexual activity: Defer       PHYSICAL EXAM  /75 (BP Location: Right arm, Patient Position: Lying)   Pulse 62   Temp 97.7 °F (36.5 °C) (Oral)   Resp 16   Ht 160 cm (63\")   Wt 88 kg (194 lb)   SpO2 96%   BMI 34.37 kg/m²   Wt Readings from Last 3 Encounters:   23 88 kg (194 lb)   23 89.4 kg (197 lb)   22 88.9 kg (196 lb)   ,body mass index is 34.37 kg/m².  Physical Exam  Vitals and nursing note reviewed.   Constitutional:       General: She is not in acute distress.     Appearance: Normal appearance. She " is normal weight. She is ill-appearing. She is not toxic-appearing.   HENT:      Head: Normocephalic and atraumatic.   Eyes:      General: No scleral icterus.     Extraocular Movements: Extraocular movements intact.      Conjunctiva/sclera: Conjunctivae normal.      Pupils: Pupils are equal, round, and reactive to light.   Cardiovascular:      Rate and Rhythm: Normal rate and regular rhythm.      Pulses: Normal pulses.      Heart sounds: Normal heart sounds.   Pulmonary:      Effort: Pulmonary effort is normal. No respiratory distress.      Breath sounds: Normal breath sounds.   Abdominal:      General: Abdomen is flat. Bowel sounds are normal. There is no distension.      Palpations: Abdomen is soft. There is no mass.      Tenderness: There is abdominal tenderness. There is no guarding or rebound.      Hernia: No hernia is present.   Genitourinary:     Comments: defer  Musculoskeletal:         General: Normal range of motion.      Right lower leg: No edema.      Left lower leg: No edema.   Skin:     General: Skin is warm and dry.      Capillary Refill: Capillary refill takes less than 2 seconds.      Coloration: Skin is not jaundiced or pale.   Neurological:      General: No focal deficit present.      Mental Status: She is alert and oriented to person, place, and time.   Psychiatric:         Mood and Affect: Mood normal.         Behavior: Behavior normal.         Thought Content: Thought content normal.         Judgment: Judgment normal.       Results Review:   I reviewed the patient's new clinical results.  I reviewed the patient's new imaging results and agree with the interpretation.  I reviewed the patient's other test results and agree with the interpretation  I personally viewed and interpreted the patient's EKG/Telemetry data    Lab Results   Component Value Date    WBC 6.01 09/23/2023    HGB 13.5 09/23/2023    HGB 14.2 09/22/2023    HGB 14.9 03/19/2021    HCT 41.3 09/23/2023    MCV 86.4 09/23/2023    PLT  253 09/23/2023       No results found for: INR    Lab Results   Component Value Date    GLUCOSE 181 (H) 09/23/2023    BUN 17 09/23/2023    CREATININE 0.94 09/23/2023    EGFRIFNONA 73 03/19/2021    BCR 18.1 09/23/2023     09/23/2023    K 4.6 09/23/2023    CO2 25.0 09/23/2023    CALCIUM 8.8 09/23/2023    ALBUMIN 3.7 09/23/2023    ALKPHOS 126 (H) 09/23/2023    BILITOT 1.4 (H) 09/23/2023    ALT 27 09/23/2023    AST 32 09/23/2023       CT Abdomen Pelvis Without Contrast    Result Date: 9/22/2023  CT ABDOMEN PELVIS WO CONTRAST Date of Exam: 9/22/2023 7:55 PM EDT Indication: upper abd pain. right flank pain.. Comparison: 3/24/2021 Technique: Axial CT images were obtained of the abdomen and pelvis without the administration of contrast. Reconstructed coronal and sagittal images were also obtained. Automated exposure control and iterative construction methods were used. Findings: *Lower Thorax: Lung bases are clear. *Liver: Unremarkable. *Gallbladder: Normal gallbladder. *Pancreas: Normal. *Spleen: Normal. *Adrenal Glands: Normal. *Kidneys: 3 mm nonobstructing right renal stone. No renal stones or hydronephrosis on left. *Stomach:Unremarkable. *Bowel: Nonobstructive bowel gas pattern. No bowel wall inflammation. *Appendix: Normal appendix. *Peritoneal Cavity: No pneumoperitoneum.  No lymphadenopathy. *Urinary Bladder: Unremarkable. *Pelvis: No free pelvic fluid. *Bones: No acute fractures or dislocations. No osseous destructive lesions. *     1.3 mm nonobstructing right renal stone 2.Small hiatal hernia. Electronically Signed: Ricardo Oneal MD  9/22/2023 8:17 PM EDT  Workstation ID: [id number]    CT Chest Without Contrast Diagnostic    Result Date: 9/22/2023  CT CHEST WO CONTRAST DIAGNOSTIC Date of Exam: 9/22/2023 7:55 PM EDT Indication: mid back and right flank pain. Comparison: Chest x-ray 9/22/2023 Technique: Axial CT images were obtained of the chest without contrast administration.  Reconstructed coronal and  sagittal images were also obtained. Automated exposure control and iterative construction methods were used. Findings: *Heart: Heart size normal without pericardial effusion. *Lymphadenopathy: No thoracic lymphadenopathy. *Lungs: No focal consolidation. No pleural effusions. *Pneumothorax: None. *Bones: No acute fractures or dislocations. No osseous destructive lesions. * *    1.No acute findings. Electronically Signed: Ricardo Oneal MD  9/22/2023 8:28 PM EDT  Workstation ID: [id number]    NM Lung Scan Perfusion Particulate    Result Date: 9/22/2023  DATE OF EXAM: 9/22/2023 10:09 PM EDT PROCEDURE: NM LUNG SCAN PERFUSION PARTICULATE INDICATIONS: cp, elev dimer, sob COMPARISON: 9/22/2023. TECHNIQUE: 5.0 mCi of technetium 99m labeled MAA was administered intravenously for the perfusion portion of the pulmonary exam. Scintigraphic images of the lungs were obtained in multiple projections to assess perfusion. FINDINGS: Homogeneous perfusion present. No peripheral or wedge-shaped defects identified.     Normal perfusion. No suspicious peripheral or wedge-shaped defects identified. Electronically Signed: Sneha South MD  9/22/2023 10:56 PM EDT  Workstation ID: [id number]    XR Chest 1 View    Result Date: 9/22/2023  XR CHEST 1 VW Date of Exam: 9/22/2023 6:52 PM EDT Indication: cp. back pain. Comparison: None available. Findings: Heart size and pulmonary vessels are within normal limits. The lungs are clear bilaterally. There are no pleural effusions. No evidence pneumothorax. Bony structures are unremarkable.     Impression: 1. No acute cardiopulmonary disease. Electronically Signed: Seth Guerra MD  9/22/2023 7:16 PM EDT  Workstation ID: [id number]    Mammo Diagnostic Digital Tomosynthesis Left With CAD    Addendum Date: 9/14/2023    CORRECTION:  RECOMMENDATION: Recommend clinical follow-up of the left breast. The patient is due after 3/9/2024 for her next screening exam.  This report was finalized on 9/14/2023 12:15 PM by  Dr. Tita Rivera MD.      Result Date: 9/14/2023  LEFT DIAGNOSTIC MAMMOGRAM WITH TOMOSYNTHESIS AND A FOCUSED LEFT BREAST ULTRASOUND  CLINICAL INDICATION: 68-year-old patient presents for evaluation of pain involving the lateral aspect of her left breast in left axillary region. The patient is status post a previous left breast lumpectomy in 2019.  TECHNIQUE: Low dose full field digital breast tomosynthesis imaging was performed consisting of bilateral CC and MLO views. Also, a left CC focal compression view was performed as well as a left ML view both with tomosynthesis. Focused ultrasound imaging of the left breast was performed.  COMPARISON: 3/9/2023, 2/25/2022, 5/11/2021, 10/9/2020, 2/14/2020, 9/13/2019, 2/8/2019  FINDINGS: There are scattered areas of fibroglandular density.  The fibroglandular pattern is stable. The asymmetry/architectural distortion associated with the posterior 3:00 distribution lumpectomy bed is stable. A nodular asymmetry in the mid lateral breast on the CC view improves with focal compression imaging. No spiculated masses or suspicious calcifications are seen. There is no evidence of axillary adenopathy or skin thickening.  Focused ultrasound imaging of the lateral aspect of the left breast targeted to the patient's area of pain was performed for further evaluation. There are changes in the 3:00 distribution, 11 cm from the nipple consistent with the patient's lumpectomy scar. Located at 3:00, 15 cm from the nipple is a benign-appearing lymph node measuring 0.4 cm in size. No concerning solid masses or abnormal areas of shadowing are seen.      Stable mammographic appearance of the left breast with no new or suspicious finding. There are stable post lumpectomy changes as described above. Ultrasound imaging of the lateral aspect of the left breast in the area of the patient's pain demonstrates a small benign-appearing lymph node located at 3:00, 15 cm from the nipple as well as changes  from the patient's lumpectomy also in the 3:00 distribution.  RECOMMENDATION: Recommend clinical follow-up of the left breast. The patient is due after 8/9/2023 for her next screening mammogram.  ACR BI-RADS CATEGORY: 2, BENIGN  CAD was utilized.  The standard false-negative rate of mammography is between 10% and 25%. Complex patterns or increased breast density will markedly elevate the false-negative rate of mammography.    A letter, in lay terminology, with the results of this exam was given to the patient at the time of the visit.  At our facility, a triangular marker is positioned over a palpable area of concern indicated by the patient. A [place] marker is placed over a visible skin lesion. A linear marker indicates a scar.    This report was finalized on 9/12/2023 3:40 PM by Dr. Tita Rivera MD.      US Breast Left Limited    Addendum Date: 9/14/2023    CORRECTION:  RECOMMENDATION: Recommend clinical follow-up of the left breast. The patient is due after 3/9/2024 for her next screening exam.  This report was finalized on 9/14/2023 12:15 PM by Dr. Tita Rivera MD.      Result Date: 9/14/2023  LEFT DIAGNOSTIC MAMMOGRAM WITH TOMOSYNTHESIS AND A FOCUSED LEFT BREAST ULTRASOUND  CLINICAL INDICATION: 68-year-old patient presents for evaluation of pain involving the lateral aspect of her left breast in left axillary region. The patient is status post a previous left breast lumpectomy in 2019.  TECHNIQUE: Low dose full field digital breast tomosynthesis imaging was performed consisting of bilateral CC and MLO views. Also, a left CC focal compression view was performed as well as a left ML view both with tomosynthesis. Focused ultrasound imaging of the left breast was performed.  COMPARISON: 3/9/2023, 2/25/2022, 5/11/2021, 10/9/2020, 2/14/2020, 9/13/2019, 2/8/2019  FINDINGS: There are scattered areas of fibroglandular density.  The fibroglandular pattern is stable. The asymmetry/architectural distortion associated  with the posterior 3:00 distribution lumpectomy bed is stable. A nodular asymmetry in the mid lateral breast on the CC view improves with focal compression imaging. No spiculated masses or suspicious calcifications are seen. There is no evidence of axillary adenopathy or skin thickening.  Focused ultrasound imaging of the lateral aspect of the left breast targeted to the patient's area of pain was performed for further evaluation. There are changes in the 3:00 distribution, 11 cm from the nipple consistent with the patient's lumpectomy scar. Located at 3:00, 15 cm from the nipple is a benign-appearing lymph node measuring 0.4 cm in size. No concerning solid masses or abnormal areas of shadowing are seen.      Stable mammographic appearance of the left breast with no new or suspicious finding. There are stable post lumpectomy changes as described above. Ultrasound imaging of the lateral aspect of the left breast in the area of the patient's pain demonstrates a small benign-appearing lymph node located at 3:00, 15 cm from the nipple as well as changes from the patient's lumpectomy also in the 3:00 distribution.  RECOMMENDATION: Recommend clinical follow-up of the left breast. The patient is due after 8/9/2023 for her next screening mammogram.  ACR BI-RADS CATEGORY: 2, BENIGN  CAD was utilized.  The standard false-negative rate of mammography is between 10% and 25%. Complex patterns or increased breast density will markedly elevate the false-negative rate of mammography.    A letter, in lay terminology, with the results of this exam was given to the patient at the time of the visit.  At our facility, a triangular marker is positioned over a palpable area of concern indicated by the patient. A [place] marker is placed over a visible skin lesion. A linear marker indicates a scar.    This report was finalized on 9/12/2023 3:40 PM by Dr. Tita Rivera MD.      COVID19   Date Value Ref Range Status   09/22/2023 Not Detected  Not Detected - Ref. Range Final     ASSESSMENTS/PLANS  1.  Acute postprandial abdominal pain, epigastric abdominal pain  2.  GERD  3.  Exocrine pancreatic insufficiency, managed on Creon  4.  Type 2 diabetes mellitus, A1c 8.7  5.  Elevated bilirubin    Ava Mota is a 68 y.o. female presented to hospital with epigastric pain that is worse postprandially.  Noted unremarkable upper endoscopy last year.  Given severity of symptoms, strong suspicion for PUD.  Recommend EGD tomorrow for further evaluation.  In terms of patient's elevated bilirubin, will check a direct level to assess for Gilbert's as her bilirubin has been elevated to the significance historically.  No evidence of pancreatitis on imaging.    >>> N.p.o. at midnight; okay for GI soft diet as tolerated-patient request, may continue liquid diet.  >>> Obtain informed consent for esophagogastroduodenoscopy  >>> IV PPI twice daily  >>> Trial GI cocktail  >>> We will check direct bilirubin level to assess for Gilbert's syndrome.  >>> Continue creon at home dosing     I discussed the patient's findings and my recommendations with patient, family, and nursing staff    Ty Rolon, PARDEEP  09/23/23  11:57 EDT

## 2023-09-23 NOTE — PLAN OF CARE
Problem: Adult Inpatient Plan of Care  Goal: Plan of Care Review  9/23/2023 1833 by Marita Estrada RN  Outcome: Ongoing, Progressing  9/23/2023 1833 by Marita Estrada RN  Outcome: Ongoing, Not Progressing  Goal: Patient-Specific Goal (Individualized)  9/23/2023 1833 by Marita Estrada RN  Outcome: Ongoing, Progressing  9/23/2023 1833 by Marita Estrada RN  Outcome: Ongoing, Not Progressing  Goal: Absence of Hospital-Acquired Illness or Injury  9/23/2023 1833 by Marita Estrada RN  Outcome: Ongoing, Progressing  9/23/2023 1833 by Marita Estrada RN  Outcome: Ongoing, Not Progressing  Intervention: Identify and Manage Fall Risk  Recent Flowsheet Documentation  Taken 9/23/2023 1800 by Marita Estrada RN  Safety Promotion/Fall Prevention:   activity supervised   clutter free environment maintained   assistive device/personal items within reach   fall prevention program maintained  Taken 9/23/2023 1600 by Marita Estrada RN  Safety Promotion/Fall Prevention:   activity supervised   assistive device/personal items within reach   clutter free environment maintained   fall prevention program maintained  Taken 9/23/2023 1400 by Marita Estrada RN  Safety Promotion/Fall Prevention:   activity supervised   assistive device/personal items within reach   clutter free environment maintained   fall prevention program maintained  Taken 9/23/2023 1200 by Marita Estrada RN  Safety Promotion/Fall Prevention:   activity supervised   assistive device/personal items within reach   clutter free environment maintained   fall prevention program maintained  Taken 9/23/2023 1000 by Marita Estrada RN  Safety Promotion/Fall Prevention:   activity supervised   assistive device/personal items within reach   clutter free environment maintained   fall prevention program maintained  Taken 9/23/2023 0800 by Marita Estrada RN  Safety Promotion/Fall Prevention:   activity supervised   assistive device/personal  items within reach  Intervention: Prevent Skin Injury  Recent Flowsheet Documentation  Taken 9/23/2023 1800 by Marita Estrada RN  Body Position: position changed independently  Taken 9/23/2023 1600 by Marita Estrada RN  Body Position: position changed independently  Taken 9/23/2023 1400 by Marita Estrada RN  Body Position: position changed independently  Taken 9/23/2023 1200 by Marita Estrada RN  Body Position: position changed independently  Taken 9/23/2023 1000 by Marita Estrada RN  Body Position: position changed independently  Taken 9/23/2023 0800 by Marita Estrada RN  Body Position: position changed independently  Skin Protection:   adhesive use limited   tubing/devices free from skin contact  Intervention: Prevent and Manage VTE (Venous Thromboembolism) Risk  Recent Flowsheet Documentation  Taken 9/23/2023 1800 by Marita Estrada RN  Activity Management: activity encouraged  Taken 9/23/2023 1600 by Marita Estrada RN  Activity Management: activity encouraged  Taken 9/23/2023 1400 by Marita Estrada RN  Activity Management: activity encouraged  Taken 9/23/2023 1200 by Marita Estrada RN  Activity Management: activity encouraged  Taken 9/23/2023 1000 by Marita Estrada RN  Activity Management: activity encouraged  Taken 9/23/2023 0800 by Marita Estrada RN  Activity Management: activity encouraged  VTE Prevention/Management: bilateral  Range of Motion: active ROM (range of motion) encouraged  Intervention: Prevent Infection  Recent Flowsheet Documentation  Taken 9/23/2023 1800 by Marita Estrada RN  Infection Prevention:   environmental surveillance performed   equipment surfaces disinfected   hand hygiene promoted   rest/sleep promoted  Taken 9/23/2023 1600 by Marita Estrada RN  Infection Prevention:   equipment surfaces disinfected   environmental surveillance performed   hand hygiene promoted   rest/sleep promoted  Taken 9/23/2023 1400 by Marita Estrada  RN  Infection Prevention:   environmental surveillance performed   equipment surfaces disinfected   hand hygiene promoted   rest/sleep promoted  Taken 9/23/2023 1200 by Marita Estrada RN  Infection Prevention:   environmental surveillance performed   equipment surfaces disinfected   hand hygiene promoted   rest/sleep promoted  Taken 9/23/2023 1000 by Marita Estrada RN  Infection Prevention:   environmental surveillance performed   equipment surfaces disinfected   hand hygiene promoted  Taken 9/23/2023 0800 by Marita Estrada RN  Infection Prevention:   environmental surveillance performed   equipment surfaces disinfected   hand hygiene promoted  Goal: Optimal Comfort and Wellbeing  9/23/2023 1833 by Marita Estrada RN  Outcome: Ongoing, Progressing  9/23/2023 1833 by Marita Estrada RN  Outcome: Ongoing, Not Progressing  Intervention: Provide Person-Centered Care  Recent Flowsheet Documentation  Taken 9/23/2023 0800 by Marita Estrada RN  Trust Relationship/Rapport:   care explained   choices provided   questions answered   questions encouraged  Goal: Readiness for Transition of Care  9/23/2023 1833 by Marita Estrada RN  Outcome: Ongoing, Progressing  9/23/2023 1833 by Marita Estrada RN  Outcome: Ongoing, Not Progressing     Problem: Pain Acute  Goal: Acceptable Pain Control and Functional Ability  9/23/2023 1833 by Marita Estrada RN  Outcome: Ongoing, Progressing  9/23/2023 1833 by Marita Estrada RN  Outcome: Ongoing, Not Progressing  Intervention: Prevent or Manage Pain  Recent Flowsheet Documentation  Taken 9/23/2023 1800 by Marita Estrada RN  Medication Review/Management: medications reviewed  Taken 9/23/2023 1600 by Marita Estrada RN  Medication Review/Management: medications reviewed  Taken 9/23/2023 1400 by Marita Estrada RN  Medication Review/Management: medications reviewed  Taken 9/23/2023 1200 by Marita Estrada RN  Medication Review/Management:  medications reviewed  Taken 9/23/2023 1000 by Marita Estrada RN  Medication Review/Management: medications reviewed  Taken 9/23/2023 0800 by Marita Estrada RN  Medication Review/Management: medications reviewed  Intervention: Optimize Psychosocial Wellbeing  Recent Flowsheet Documentation  Taken 9/23/2023 0800 by Marita Estrada, RN  Supportive Measures:   active listening utilized   verbalization of feelings encouraged     Problem: Nausea and Vomiting  Goal: Fluid and Electrolyte Balance  9/23/2023 1833 by Marita Estrada RN  Outcome: Ongoing, Progressing  9/23/2023 1833 by Marita Estrada RN  Outcome: Ongoing, Not Progressing  Intervention: Prevent and Manage Nausea and Vomiting  Recent Flowsheet Documentation  Taken 9/23/2023 0800 by Marita Estrada RN  Environmental Support: calm environment promoted   Goal Outcome Evaluation:

## 2023-09-24 ENCOUNTER — ANESTHESIA (OUTPATIENT)
Dept: GASTROENTEROLOGY | Facility: HOSPITAL | Age: 68
End: 2023-09-24
Payer: MEDICARE

## 2023-09-24 ENCOUNTER — ANESTHESIA EVENT (OUTPATIENT)
Dept: GASTROENTEROLOGY | Facility: HOSPITAL | Age: 68
End: 2023-09-24
Payer: MEDICARE

## 2023-09-24 VITALS
DIASTOLIC BLOOD PRESSURE: 82 MMHG | WEIGHT: 194 LBS | SYSTOLIC BLOOD PRESSURE: 138 MMHG | HEIGHT: 63 IN | TEMPERATURE: 97.9 F | OXYGEN SATURATION: 91 % | BODY MASS INDEX: 34.38 KG/M2 | RESPIRATION RATE: 16 BRPM | HEART RATE: 74 BPM

## 2023-09-24 PROBLEM — R10.13 EPIGASTRIC PAIN: Status: RESOLVED | Noted: 2023-09-22 | Resolved: 2023-09-24

## 2023-09-24 PROBLEM — R09.02 HYPOXIA: Status: RESOLVED | Noted: 2023-09-22 | Resolved: 2023-09-24

## 2023-09-24 LAB
ALBUMIN SERPL-MCNC: 3.4 G/DL (ref 3.5–5.2)
ALBUMIN/GLOB SERPL: 1.3 G/DL
ALP SERPL-CCNC: 122 U/L (ref 39–117)
ALT SERPL W P-5'-P-CCNC: 25 U/L (ref 1–33)
ANION GAP SERPL CALCULATED.3IONS-SCNC: 7 MMOL/L (ref 5–15)
AST SERPL-CCNC: 19 U/L (ref 1–32)
BACTERIA SPEC AEROBE CULT: NORMAL
BILIRUB SERPL-MCNC: 1.3 MG/DL (ref 0–1.2)
BUN SERPL-MCNC: 13 MG/DL (ref 8–23)
BUN/CREAT SERPL: 16.9 (ref 7–25)
CALCIUM SPEC-SCNC: 8.6 MG/DL (ref 8.6–10.5)
CHLORIDE SERPL-SCNC: 105 MMOL/L (ref 98–107)
CO2 SERPL-SCNC: 28 MMOL/L (ref 22–29)
CREAT SERPL-MCNC: 0.77 MG/DL (ref 0.57–1)
DEPRECATED RDW RBC AUTO: 42.6 FL (ref 37–54)
EGFRCR SERPLBLD CKD-EPI 2021: 83.1 ML/MIN/1.73
ERYTHROCYTE [DISTWIDTH] IN BLOOD BY AUTOMATED COUNT: 13.1 % (ref 12.3–15.4)
GLOBULIN UR ELPH-MCNC: 2.6 GM/DL
GLUCOSE BLDC GLUCOMTR-MCNC: 181 MG/DL (ref 70–130)
GLUCOSE BLDC GLUCOMTR-MCNC: 318 MG/DL (ref 70–130)
GLUCOSE SERPL-MCNC: 153 MG/DL (ref 65–99)
HCT VFR BLD AUTO: 41.8 % (ref 34–46.6)
HGB BLD-MCNC: 13.3 G/DL (ref 12–15.9)
MCH RBC QN AUTO: 28.1 PG (ref 26.6–33)
MCHC RBC AUTO-ENTMCNC: 31.8 G/DL (ref 31.5–35.7)
MCV RBC AUTO: 88.4 FL (ref 79–97)
PLATELET # BLD AUTO: 249 10*3/MM3 (ref 140–450)
PMV BLD AUTO: 9.3 FL (ref 6–12)
POTASSIUM SERPL-SCNC: 4.4 MMOL/L (ref 3.5–5.2)
PROT SERPL-MCNC: 6 G/DL (ref 6–8.5)
RBC # BLD AUTO: 4.73 10*6/MM3 (ref 3.77–5.28)
SODIUM SERPL-SCNC: 140 MMOL/L (ref 136–145)
WBC NRBC COR # BLD: 4.64 10*3/MM3 (ref 3.4–10.8)

## 2023-09-24 PROCEDURE — 82948 REAGENT STRIP/BLOOD GLUCOSE: CPT

## 2023-09-24 PROCEDURE — 80053 COMPREHEN METABOLIC PANEL: CPT | Performed by: INTERNAL MEDICINE

## 2023-09-24 PROCEDURE — 63710000001 LOSARTAN 50 MG TABLET: Performed by: INTERNAL MEDICINE

## 2023-09-24 PROCEDURE — 43235 EGD DIAGNOSTIC BRUSH WASH: CPT | Performed by: INTERNAL MEDICINE

## 2023-09-24 PROCEDURE — A9270 NON-COVERED ITEM OR SERVICE: HCPCS | Performed by: INTERNAL MEDICINE

## 2023-09-24 PROCEDURE — 63710000001 CETIRIZINE 10 MG TABLET: Performed by: INTERNAL MEDICINE

## 2023-09-24 PROCEDURE — 85027 COMPLETE CBC AUTOMATED: CPT | Performed by: INTERNAL MEDICINE

## 2023-09-24 PROCEDURE — 25010000002 ENOXAPARIN PER 10 MG: Performed by: INTERNAL MEDICINE

## 2023-09-24 PROCEDURE — 63710000001 INSULIN LISPRO (HUMAN) PER 5 UNITS: Performed by: INTERNAL MEDICINE

## 2023-09-24 PROCEDURE — 25010000002 PROPOFOL 10 MG/ML EMULSION

## 2023-09-24 PROCEDURE — G0378 HOSPITAL OBSERVATION PER HR: HCPCS

## 2023-09-24 RX ORDER — SODIUM CHLORIDE 0.9 % (FLUSH) 0.9 %
10 SYRINGE (ML) INJECTION AS NEEDED
Status: CANCELLED | OUTPATIENT
Start: 2023-09-24

## 2023-09-24 RX ORDER — SODIUM CHLORIDE, SODIUM LACTATE, POTASSIUM CHLORIDE, CALCIUM CHLORIDE 600; 310; 30; 20 MG/100ML; MG/100ML; MG/100ML; MG/100ML
9 INJECTION, SOLUTION INTRAVENOUS CONTINUOUS
Status: CANCELLED | OUTPATIENT
Start: 2023-09-24

## 2023-09-24 RX ORDER — FAMOTIDINE 20 MG/1
20 TABLET, FILM COATED ORAL ONCE
Status: CANCELLED | OUTPATIENT
Start: 2023-09-24 | End: 2023-09-24

## 2023-09-24 RX ORDER — SODIUM CHLORIDE, SODIUM LACTATE, POTASSIUM CHLORIDE, CALCIUM CHLORIDE 600; 310; 30; 20 MG/100ML; MG/100ML; MG/100ML; MG/100ML
INJECTION, SOLUTION INTRAVENOUS CONTINUOUS PRN
Status: DISCONTINUED | OUTPATIENT
Start: 2023-09-24 | End: 2023-09-24 | Stop reason: SURG

## 2023-09-24 RX ORDER — ONDANSETRON 2 MG/ML
4 INJECTION INTRAMUSCULAR; INTRAVENOUS ONCE AS NEEDED
Status: DISCONTINUED | OUTPATIENT
Start: 2023-09-24 | End: 2023-09-24 | Stop reason: HOSPADM

## 2023-09-24 RX ORDER — SODIUM CHLORIDE 0.9 % (FLUSH) 0.9 %
10 SYRINGE (ML) INJECTION EVERY 12 HOURS SCHEDULED
Status: CANCELLED | OUTPATIENT
Start: 2023-09-24

## 2023-09-24 RX ORDER — LIDOCAINE HYDROCHLORIDE 10 MG/ML
INJECTION, SOLUTION EPIDURAL; INFILTRATION; INTRACAUDAL; PERINEURAL AS NEEDED
Status: DISCONTINUED | OUTPATIENT
Start: 2023-09-24 | End: 2023-09-24 | Stop reason: SURG

## 2023-09-24 RX ORDER — IPRATROPIUM BROMIDE AND ALBUTEROL SULFATE 2.5; .5 MG/3ML; MG/3ML
3 SOLUTION RESPIRATORY (INHALATION) ONCE AS NEEDED
Status: DISCONTINUED | OUTPATIENT
Start: 2023-09-24 | End: 2023-09-24 | Stop reason: HOSPADM

## 2023-09-24 RX ORDER — MIDAZOLAM HYDROCHLORIDE 1 MG/ML
0.5 INJECTION INTRAMUSCULAR; INTRAVENOUS
Status: CANCELLED | OUTPATIENT
Start: 2023-09-24

## 2023-09-24 RX ORDER — SODIUM CHLORIDE 9 MG/ML
40 INJECTION, SOLUTION INTRAVENOUS AS NEEDED
Status: CANCELLED | OUTPATIENT
Start: 2023-09-24

## 2023-09-24 RX ORDER — LIDOCAINE HYDROCHLORIDE 10 MG/ML
0.5 INJECTION, SOLUTION EPIDURAL; INFILTRATION; INTRACAUDAL; PERINEURAL ONCE AS NEEDED
Status: CANCELLED | OUTPATIENT
Start: 2023-09-24

## 2023-09-24 RX ORDER — FAMOTIDINE 10 MG/ML
20 INJECTION, SOLUTION INTRAVENOUS ONCE
Status: CANCELLED | OUTPATIENT
Start: 2023-09-24 | End: 2023-09-24

## 2023-09-24 RX ORDER — PROPOFOL 10 MG/ML
VIAL (ML) INTRAVENOUS AS NEEDED
Status: DISCONTINUED | OUTPATIENT
Start: 2023-09-24 | End: 2023-09-24 | Stop reason: SURG

## 2023-09-24 RX ADMIN — ENOXAPARIN SODIUM 40 MG: 100 INJECTION SUBCUTANEOUS at 09:32

## 2023-09-24 RX ADMIN — PANTOPRAZOLE SODIUM 40 MG: 40 INJECTION, POWDER, LYOPHILIZED, FOR SOLUTION INTRAVENOUS at 09:35

## 2023-09-24 RX ADMIN — INSULIN LISPRO 5 UNITS: 100 INJECTION, SOLUTION INTRAVENOUS; SUBCUTANEOUS at 12:08

## 2023-09-24 RX ADMIN — CETIRIZINE HYDROCHLORIDE 10 MG: 10 TABLET, FILM COATED ORAL at 09:32

## 2023-09-24 RX ADMIN — FLUTICASONE PROPIONATE 2 SPRAY: 50 SPRAY, METERED NASAL at 09:34

## 2023-09-24 RX ADMIN — LOSARTAN POTASSIUM 100 MG: 50 TABLET, FILM COATED ORAL at 09:32

## 2023-09-24 RX ADMIN — PROPOFOL 100 MG: 10 INJECTION, EMULSION INTRAVENOUS at 08:01

## 2023-09-24 RX ADMIN — LIDOCAINE HYDROCHLORIDE 50 MG: 10 INJECTION, SOLUTION EPIDURAL; INFILTRATION; INTRACAUDAL; PERINEURAL at 08:01

## 2023-09-24 RX ADMIN — SODIUM CHLORIDE, POTASSIUM CHLORIDE, SODIUM LACTATE AND CALCIUM CHLORIDE: 600; 310; 30; 20 INJECTION, SOLUTION INTRAVENOUS at 07:55

## 2023-09-24 RX ADMIN — Medication 10 ML: at 09:34

## 2023-09-24 NOTE — DISCHARGE SUMMARY
[facility] Medicine Services  DISCHARGE SUMMARY    Patient Name: Ava Mota  : 1955  MRN: [id number]    Date of Admission: 2023  6:45 PM  Date of Discharge:  23  Primary Care Physician: Liliane Hooper MD    Consults       Date and Time Order Name Status Description    2023  9:02 AM Inpatient Gastroenterology Consult Completed             Hospital Course     Presenting Problem: epigastric pain    Active Hospital Problems    Diagnosis  POA    HTN (hypertension) [I10]  Yes    Pyuria [R82.81]  Yes    Kidney stone [N20.0]  Yes    GERD (gastroesophageal reflux disease) [K21.9]  Yes    T2DM (type 2 diabetes mellitus) [E11.9]  Yes    History of breast cancer [Z85.3]  Not Applicable      Resolved Hospital Problems    Diagnosis Date Resolved POA    **Epigastric pain [R10.13] 2023 Yes    Hypoxia [R09.02] 2023 Yes          Hospital Course:  Ava Mota is a 68 y.o. female w/ a hx of breast cancer, HTN, T2DM, GERD who presented to the ED w/ c/o epigastric pain.      Epigastric pain   Hx of GERD  H/o pancreatic insufficiency   Elevated bili - [place] disease  -w/ c/o nausea, diarrhea, bloating and worsening pain after oral intake   -CT abd/pel unremarkable  -pt reports colonoscopy/EGD ~1-2 years ago (reflux). Improved with 14 days of PPI   -Patient was started on a PPI and GI was consulted.  She had an EGD which was unremarkable.  Overall symptoms have improved.  She will be discharged to slowly advance diet as tolerated.     Back pain  - right paraspinals.  Improved     Hypoxia   -Suspect related to atelectasis  -D.Dimer elevated but pt w/ contrast allergy; V/Q scan obtained and unremarkable   -CT Chest without contrast unremarkable   -Oxygen use improved with mobilization and deep inspiration.  Weaned off of oxygen prior to discharge.     Non-obstructing kidney stone   -CT abd/pel- 3 mm nonobstructing right renal stone   -pt w/ c/o mid back  pain but denies dysuria, flank or low back pain   -UA w/ WBCs; urine culture pending but currently with no urinary symptoms     T2DM  -hem A1c 8.7  - resume home meds on discharge    HTN   - losartan 100      Hx of breast cancer     Discharge Follow Up Recommendations for outpatient labs/diagnostics:  PCP Dr. Tara Nunes     Day of Discharge     HPI:   No acute events.  States she did well with EGD.  Overall her abdominal pain is improved.  She tolerated a diet yesterday.  Reviewed plans for discharge today and she is agreeable.  Answered all questions the best my ability.    Review of Systems  Gen- No fevers, chills  CV- No chest pain, palpitations  Resp- No cough, dyspnea  GI- No N/V/D, abd pain    Vital Signs:   Temp:  [97 °F (36.1 °C)-97.9 °F (36.6 °C)] 97.8 °F (36.6 °C)  Heart Rate:  [62-67] 64  Resp:  [12-20] 18  BP: (124-170)/() 170/81  Flow (L/min):  [2] 2      Physical Exam:  Constitutional: No acute distress, awake, alert  HENT: NCAT, mucous membranes moist  Respiratory: Clear to auscultation bilaterally, respiratory effort normal   Cardiovascular: RRR, no murmurs, rubs, or gallops  Gastrointestinal: Positive bowel sounds, soft, mild tenderness but improved  Musculoskeletal: No bilateral ankle edema  Psychiatric: Appropriate affect, cooperative  Neurologic: Oriented x 3, strength symmetric in all extremities, Cranial Nerves grossly intact to confrontation, speech clear  Skin: No rashes      Pertinent  and/or Most Recent Results     LAB RESULTS:      Lab 09/24/23 0358 09/23/23 0237 09/22/23  1848   WBC 4.64 6.01 5.43   HEMOGLOBIN 13.3 13.5 14.2   HEMATOCRIT 41.8 41.3 44.1   PLATELETS 249 253 281   NEUTROS ABS  --   --  2.96   IMMATURE GRANS (ABS)  --   --  0.04   LYMPHS ABS  --   --  1.77   MONOS ABS  --   --  0.49   EOS ABS  --   --  0.13   MCV 88.4 86.4 86.6   LACTATE  --   --  1.9   D DIMER QUANT  --   --  0.79*         Lab 09/24/23  0358 09/23/23  0237 09/22/23  1848   SODIUM 140 141 138    POTASSIUM 4.4 4.6 4.2   CHLORIDE 105 104 102   CO2 28.0 25.0 26.0   ANION GAP 7.0 12.0 10.0   BUN 13 17 15   CREATININE 0.77 0.94 0.70   EGFR 83.1 65.4 93.2   GLUCOSE 153* 181* 222*   CALCIUM 8.6 8.8 8.9   MAGNESIUM  --  2.3  --    HEMOGLOBIN A1C  --  8.70*  --          Lab 09/24/23  0358 09/23/23  1427 09/23/23  0237 09/22/23  1848   TOTAL PROTEIN 6.0  --  6.8 7.0   ALBUMIN 3.4*  --  3.7 3.8   GLOBULIN 2.6  --  3.1 3.2   ALT (SGPT) 25  --  27 20   AST (SGOT) 19  --  32 20   BILIRUBIN 1.3*  --  1.4* 1.3*   BILIRUBIN DIRECT  --  0.2  --   --    ALK PHOS 122*  --  126* 131*   LIPASE  --   --   --  22         Lab 09/23/23  0431 09/23/23  0237 09/22/23  2047 09/22/23  1848   PROBNP  --   --   --  36.5   HSTROP T 7 7 12* 8                 Brief Urine Lab Results  (Last result in the past 365 days)        Color   Clarity   Blood   Leuk Est   Nitrite   Protein   CREAT   Urine HCG        09/22/23 1850 Yellow   Clear   Negative   Small (1+)   Negative   Negative                 Microbiology Results (last 10 days)       Procedure Component Value - Date/Time    COVID PRE-OP / PRE-PROCEDURE SCREENING ORDER (NO ISOLATION) - Swab, Nasopharynx [[id number]]  (Normal) Collected: 09/22/23 2112    Lab Status: Final result Specimen: Swab from Nasopharynx Updated: 09/22/23 2209    Narrative:      The following orders were created for panel order COVID PRE-OP / PRE-PROCEDURE SCREENING ORDER (NO ISOLATION) - Swab, Nasopharynx.  Procedure                               Abnormality         Status                     ---------                               -----------         ------                     Respiratory Panel PCR w/...[[id number]]  Normal              Final result                 Please view results for these tests on the individual orders.    Respiratory Panel PCR w/COVID-19(SARS-CoV-2) [facility]/EMIR In-House, NP Swab in UTM/VTM, 3-4 HR TAT - Swab, Nasopharynx [[id number]]  (Normal) Collected: 09/22/23 2112    Lab  Status: Final result Specimen: Swab from Nasopharynx Updated: 09/22/23 2209     ADENOVIRUS, PCR Not Detected     Coronavirus 229E Not Detected     Coronavirus HKU1 Not Detected     Coronavirus NL63 Not Detected     Coronavirus OC43 Not Detected     COVID19 Not Detected     Human Metapneumovirus Not Detected     Human Rhinovirus/Enterovirus Not Detected     Influenza A PCR Not Detected     Influenza B PCR Not Detected     Parainfluenza Virus 1 Not Detected     Parainfluenza Virus 2 Not Detected     Parainfluenza Virus 3 Not Detected     Parainfluenza Virus 4 Not Detected     RSV, PCR Not Detected     Bordetella pertussis pcr Not Detected     Bordetella parapertussis PCR Not Detected     Chlamydophila pneumoniae PCR Not Detected     Mycoplasma pneumo by PCR Not Detected    Narrative:      In the setting of a positive respiratory panel with a viral infection PLUS a negative procalcitonin without other underlying concern for bacterial infection, consider observing off antibiotics or discontinuation of antibiotics and continue supportive care. If the respiratory panel is positive for atypical bacterial infection (Bordetella pertussis, Chlamydophila pneumoniae, or Mycoplasma pneumoniae), consider antibiotic de-escalation to target atypical bacterial infection.            CT Abdomen Pelvis Without Contrast    Result Date: 9/22/2023  CT ABDOMEN PELVIS WO CONTRAST Date of Exam: 9/22/2023 7:55 PM EDT Indication: upper abd pain. right flank pain.. Comparison: 3/24/2021 Technique: Axial CT images were obtained of the abdomen and pelvis without the administration of contrast. Reconstructed coronal and sagittal images were also obtained. Automated exposure control and iterative construction methods were used. Findings: *Lower Thorax: Lung bases are clear. *Liver: Unremarkable. *Gallbladder: Normal gallbladder. *Pancreas: Normal. *Spleen: Normal. *Adrenal Glands: Normal. *Kidneys: 3 mm nonobstructing right renal stone. No renal  stones or hydronephrosis on left. *Stomach:Unremarkable. *Bowel: Nonobstructive bowel gas pattern. No bowel wall inflammation. *Appendix: Normal appendix. *Peritoneal Cavity: No pneumoperitoneum.  No lymphadenopathy. *Urinary Bladder: Unremarkable. *Pelvis: No free pelvic fluid. *Bones: No acute fractures or dislocations. No osseous destructive lesions. *     1.3 mm nonobstructing right renal stone 2.Small hiatal hernia. Electronically Signed: Ricardo Oneal MD  9/22/2023 8:17 PM EDT  Workstation ID: [id number]    CT Chest Without Contrast Diagnostic    Result Date: 9/22/2023  CT CHEST WO CONTRAST DIAGNOSTIC Date of Exam: 9/22/2023 7:55 PM EDT Indication: mid back and right flank pain. Comparison: Chest x-ray 9/22/2023 Technique: Axial CT images were obtained of the chest without contrast administration.  Reconstructed coronal and sagittal images were also obtained. Automated exposure control and iterative construction methods were used. Findings: *Heart: Heart size normal without pericardial effusion. *Lymphadenopathy: No thoracic lymphadenopathy. *Lungs: No focal consolidation. No pleural effusions. *Pneumothorax: None. *Bones: No acute fractures or dislocations. No osseous destructive lesions. * *    1.No acute findings. Electronically Signed: Ricardo Oneal MD  9/22/2023 8:28 PM EDT  Workstation ID: [id number]    NM Lung Scan Perfusion Particulate    Result Date: 9/22/2023  DATE OF EXAM: 9/22/2023 10:09 PM EDT PROCEDURE: NM LUNG SCAN PERFUSION PARTICULATE INDICATIONS: cp, elev dimer, sob COMPARISON: 9/22/2023. TECHNIQUE: 5.0 mCi of technetium 99m labeled MAA was administered intravenously for the perfusion portion of the pulmonary exam. Scintigraphic images of the lungs were obtained in multiple projections to assess perfusion. FINDINGS: Homogeneous perfusion present. No peripheral or wedge-shaped defects identified.     Normal perfusion. No suspicious peripheral or wedge-shaped defects identified. Electronically  Signed: Sneha South MD  9/22/2023 10:56 PM EDT  Workstation ID: [id number]    XR Chest 1 View    Result Date: 9/22/2023  XR CHEST 1 VW Date of Exam: 9/22/2023 6:52 PM EDT Indication: cp. back pain. Comparison: None available. Findings: Heart size and pulmonary vessels are within normal limits. The lungs are clear bilaterally. There are no pleural effusions. No evidence pneumothorax. Bony structures are unremarkable.     Impression: 1. No acute cardiopulmonary disease. Electronically Signed: Seth Guerra MD  9/22/2023 7:16 PM EDT  Workstation ID: [id number]                 Plan for Follow-up of Pending Labs/Results:   Pending Labs       Order Current Status    Urine Culture - Urine, Urine, Clean Catch In process          Discharge Details        Discharge Medications        Continue These Medications        Instructions Start Date   Biotin 5000 MCG tablet   1 tablet, Oral, Daily      cetirizine 10 MG tablet  Commonly known as: zyrTEC   Oral, Daily      Creon 86225-06960 units capsule delayed-release particles capsule  Generic drug: pancrelipase (Lip-Prot-Amyl)   TAKE 2 BY MOUTH BEFORE EVERY MEAL AND TAKE 1 CAPSULE BY MOUTH WITH A SNACK      docusate sodium 100 MG capsule  Commonly known as: COLACE   100 mg, Oral, 2 Times Daily      ezetimibe 10 MG tablet  Commonly known as: ZETIA   10 mg, Oral, Daily      fluticasone 50 MCG/ACT nasal spray  Commonly known as: FLONASE   No dose, route, or frequency recorded.      hydrOXYzine pamoate 25 MG capsule  Commonly known as: VISTARIL   25 mg, Oral, Every Night at Bedtime      losartan 100 MG tablet  Commonly known as: COZAAR   100 mg, Oral, Daily      NOVOLIN 70/30 SC   Subcutaneous      ONE TOUCH ULTRA TEST test strip  Generic drug: glucose blood   3 Times Daily, for testing             Stop These Medications      lisinopril 5 MG tablet  Commonly known as: PRINIVIL,ZESTRIL     omeprazole 40 MG capsule  Commonly known as: priLOSEC     valsartan 320 MG tablet  Commonly known as:  DIOVAN              Allergies   Allergen Reactions    Iodine Anaphylaxis     PT STATES SHE 'PASSED OUT ' AFTER CT CONTRAST.     Dapagliflozin Unknown - Low Severity     severe yeast infection         Discharge Disposition:  Home or Self Care    Diet:  Hospital:  Diet Order   Procedures    Diet: Liquid Diets; Clear Liquid; Fluid Consistency: Thin (IDDSI 0)       Diet Instructions       Advance Diet As Tolerated -Target Diet: diabetic diet      Target Diet: diabetic diet             Activity:  Activity Instructions       Activity as Tolerated              Restrictions or Other Recommendations:         CODE STATUS:    Code Status and Medical Interventions:   Ordered at: 09/22/23 0027     Code Status (Patient has no pulse and is not breathing):    CPR (Attempt to Resuscitate)     Medical Interventions (Patient has pulse or is breathing):    Full Support       Future Appointments   Date Time Provider Department Center   1/19/2024 11:00 AM Jacy Uribe MD [facility]       Additional Instructions for the Follow-ups that You Need to Schedule       Discharge Follow-up with PCP   As directed       Currently Documented PCP:    Liliane Hooper MD    PCP Phone Number:    [phone]     Follow Up Details: PCP Dr. Tara Nunes 1 week                      Priscilla Escalera DO  09/24/23      Time Spent on Discharge:  I spent  35  minutes on this discharge activity which included: face-to-face encounter with the patient, reviewing the data in the system, coordination of the care with the nursing staff as well as consultants, documentation, and entering orders.

## 2023-09-24 NOTE — PLAN OF CARE
Problem: Adult Inpatient Plan of Care  Goal: Plan of Care Review  Outcome: Ongoing, Progressing  Goal: Patient-Specific Goal (Individualized)  Outcome: Ongoing, Progressing  Goal: Absence of Hospital-Acquired Illness or Injury  Outcome: Ongoing, Progressing  Intervention: Identify and Manage Fall Risk  Recent Flowsheet Documentation  Taken 9/23/2023 2224 by Ailyn Garcia RN  Safety Promotion/Fall Prevention: safety round/check completed  Taken 9/23/2023 2010 by Ailyn Garcia RN  Safety Promotion/Fall Prevention: safety round/check completed  Intervention: Prevent Skin Injury  Recent Flowsheet Documentation  Taken 9/23/2023 2224 by Ailyn Garcia RN  Body Position: position changed independently  Taken 9/23/2023 2010 by Ailyn Garcia RN  Body Position: position changed independently  Intervention: Prevent and Manage VTE (Venous Thromboembolism) Risk  Recent Flowsheet Documentation  Taken 9/23/2023 2224 by Ailyn Garcia RN  Activity Management: activity encouraged  Taken 9/23/2023 2010 by Ailyn Garcia RN  Activity Management: activity encouraged  Range of Motion: active ROM (range of motion) encouraged  Intervention: Prevent Infection  Recent Flowsheet Documentation  Taken 9/23/2023 2224 by Ailyn Garcia RN  Infection Prevention: environmental surveillance performed  Taken 9/23/2023 2010 by Ailyn Garcia RN  Infection Prevention: environmental surveillance performed  Goal: Optimal Comfort and Wellbeing  Outcome: Ongoing, Progressing  Intervention: Provide Person-Centered Care  Recent Flowsheet Documentation  Taken 9/23/2023 2010 by Ailyn Garcia RN  Trust Relationship/Rapport:   care explained   questions answered   choices provided   thoughts/feelings acknowledged  Goal: Readiness for Transition of Care  Outcome: Ongoing, Progressing     Problem: Pain Acute  Goal: Acceptable Pain Control and Functional Ability  Outcome: Ongoing, Progressing  Intervention: Optimize Psychosocial Wellbeing  Recent  Flowsheet Documentation  Taken 9/23/2023 2010 by Ailyn Garcia, RN  Diversional Activities: television     Problem: Fall Injury Risk  Goal: Absence of Fall and Fall-Related Injury  Outcome: Ongoing, Progressing  Intervention: Promote Injury-Free Environment  Recent Flowsheet Documentation  Taken 9/23/2023 2224 by Ailyn Garcia, RN  Safety Promotion/Fall Prevention: safety round/check completed  Taken 9/23/2023 2010 by Ailyn Garcia RN  Safety Promotion/Fall Prevention: safety round/check completed     Problem: Nausea and Vomiting  Goal: Fluid and Electrolyte Balance  Outcome: Ongoing, Progressing   Goal Outcome Evaluation:      No acute events during shift  Pain control   Nausea control   NPO at midnight   Scope for tomorrow  A&O x4  2L via nasal cannula   Sinus cristal

## 2023-09-24 NOTE — BRIEF OP NOTE
ESOPHAGOGASTRODUODENOSCOPY  Progress Note    Ava Mota  9/24/2023    EGD is normal.    Hyperbilirubinemia appears from Gilbert's syndrome.     Latest Reference Range & Units 09/23/23 14:27   Bilirubin, Direct 0.0 - 0.3 mg/dL 0.2     >> Continue supportive care.  >> Clear liquid diet, advance as tolerated.  >> Resume Creon when taking regular diet.    Mark I. Brunner, MD     Date: 9/24/2023  Time: 08:07 EDT

## 2023-09-24 NOTE — ANESTHESIA PREPROCEDURE EVALUATION
Anesthesia Evaluation     Patient summary reviewed and Nursing notes reviewed   NPO Solid Status: > 8 hours  NPO Liquid Status: > 8 hours           Airway   Mallampati: II  TM distance: >3 FB  Neck ROM: full  No difficulty expected  Dental - normal exam     Pulmonary    (+) a smoker Former,  Cardiovascular - normal exam    ECG reviewed    (+) hypertension      Neuro/Psych  GI/Hepatic/Renal/Endo    (+) obesity, GERD, renal disease stones, diabetes mellitus type 2    ROS Comment: Epigastric pain    Musculoskeletal     Abdominal    Substance History      OB/GYN          Other      history of cancer (breast cancer)                  Anesthesia Plan    ASA 3     general     (propofol)  intravenous induction     Anesthetic plan, risks, benefits, and alternatives have been provided, discussed and informed consent has been obtained with: patient.    Plan discussed with CRNA.    CODE STATUS:    Code Status (Patient has no pulse and is not breathing): CPR (Attempt to Resuscitate)  Medical Interventions (Patient has pulse or is breathing): Full Support

## 2023-09-24 NOTE — ANESTHESIA POSTPROCEDURE EVALUATION
Patient: Ava Mota    Procedure Summary       Date: 09/24/23 Room / Location:  [facility] /  [facility]    Anesthesia Start: 0754 Anesthesia Stop: 0815    Procedure: ESOPHAGOGASTRODUODENOSCOPY Diagnosis:     Surgeons: Brunner, Mark I, MD Provider: Cecy Veronica MD    Anesthesia Type: general ASA Status: 3            Anesthesia Type: general    Vitals  Vitals Value Taken Time   /69 09/24/23 0814   Temp 97.2 °F (36.2 °C) 09/24/23 0814   Pulse 67 09/24/23 0814   Resp 12 09/24/23 0814   SpO2 94 % 09/24/23 0814           Post Anesthesia Care and Evaluation    Patient location during evaluation: PACU  Patient participation: complete - patient participated  Level of consciousness: awake and alert  Pain management: adequate    Airway patency: patent  Anesthetic complications: No anesthetic complications  PONV Status: none  Cardiovascular status: hemodynamically stable and acceptable  Respiratory status: nonlabored ventilation, acceptable and nasal cannula  Hydration status: acceptable

## 2024-01-31 ENCOUNTER — TELEPHONE (OUTPATIENT)
Dept: ONCOLOGY | Facility: CLINIC | Age: 69
End: 2024-01-31
Payer: MEDICARE

## 2024-01-31 NOTE — TELEPHONE ENCOUNTER
Caller: Ava Mota    Relationship: Self    Best call back number: [phone]    What is the best time to reach you: ANYTIME    Who are you requesting to speak with (clinical staff, provider,  specific staff member): SCHEDULING    What was the call regarding: PATIENT WANTED TO KNOW IF DR JARRETT HAD ANY EARLIER APPT TIMES FOR TOMORROW 2/1. PLEASE CALL ASAP TO ADVISE.

## 2024-02-01 ENCOUNTER — OFFICE VISIT (OUTPATIENT)
Dept: ONCOLOGY | Facility: CLINIC | Age: 69
End: 2024-02-01
Payer: MEDICARE

## 2024-02-01 VITALS
HEART RATE: 70 BPM | HEIGHT: 63 IN | OXYGEN SATURATION: 98 % | WEIGHT: 204 LBS | DIASTOLIC BLOOD PRESSURE: 79 MMHG | BODY MASS INDEX: 36.14 KG/M2 | RESPIRATION RATE: 16 BRPM | SYSTOLIC BLOOD PRESSURE: 159 MMHG | TEMPERATURE: 96.9 F

## 2024-02-01 DIAGNOSIS — Z17.1 MALIGNANT NEOPLASM OF UPPER-OUTER QUADRANT OF LEFT BREAST IN FEMALE, ESTROGEN RECEPTOR NEGATIVE: Primary | ICD-10-CM

## 2024-02-01 DIAGNOSIS — C50.412 MALIGNANT NEOPLASM OF UPPER-OUTER QUADRANT OF LEFT BREAST IN FEMALE, ESTROGEN RECEPTOR NEGATIVE: Primary | ICD-10-CM

## 2024-02-01 PROCEDURE — 3077F SYST BP >= 140 MM HG: CPT | Performed by: INTERNAL MEDICINE

## 2024-02-01 PROCEDURE — 99213 OFFICE O/P EST LOW 20 MIN: CPT | Performed by: INTERNAL MEDICINE

## 2024-02-01 PROCEDURE — 3078F DIAST BP <80 MM HG: CPT | Performed by: INTERNAL MEDICINE

## 2024-02-01 PROCEDURE — 1126F AMNT PAIN NOTED NONE PRSNT: CPT | Performed by: INTERNAL MEDICINE

## 2024-02-01 NOTE — PROGRESS NOTES
PROBLEM LIST:  Oncology/Hematology History   Malignant neoplasm of upper-outer quadrant of left breast in female, estrogen receptor negative   1/15/2019 Initial Diagnosis    Malignant neoplasm of upper-outer quadrant of left breast in female, estrogen receptor negative (CMS/HCC)  Abnormal mammogram and a biopsy-proven poorly differentiated infiltrating ductal carcinoma in the left 3 o'clock position measuring 6 mm.            2/8/2019 Surgery    Surgery  Lumpectomy by Dr. PATINO on 2/8/2019.   Grade 3, high grade 6 mm tumor with margins negative by 2 mm.    Tumor was ER/[place]-negative HER-2/jakob negative and 0 of 1 lymph node was positive.             3/5/2019 Imaging    CT and bone scan scheduled 03/05/2019     3/5/2019 - 5/9/2019 Chemotherapy    OP BREAST TC DOCEtaxel / Cyclophosphamide  Start date 03/07/2019 pending.   Education 03/05/2019  Completed 05/09/2019 6/19/2019 - 7/31/2019 Radiation    Radiation OncologyTreatment Course:  Ava Mota received 5500 cGy in 30 fractions to left breast via External Beam Radiation - EBRT.     1/2/2020 Survivorship    Survivorship Care Plan completed and discussed with patient.  Copy of Survivorship Care Plan provided to patient and discussed with Mila ROBERTO Survivorship plan will be mailed to patient's address.                       REASON FOR VISIT: Triple Negative Breast cancer    HISTORY OF PRESENT ILLNESS:   68 y.o.  female presents today for management of triple negative breast cancer.  She completed 4 cycles of adjuvant chemotherapy with Taxotere and Cytoxan. She completed radiation on July 31st 2019.  She has underlying diabetes.  She also was diagnosed with pancreatic insufficiency for which she is on Creon.  Otherwise she is doing well from a cancer standpoint.  No symptoms to suggest recurrence.  Denies any issues since I last saw her.      Past medical history, social history and family history was reviewed and unchanged from prior visit.    Review of  Systems:    Review of Systems   Constitutional: Positive for fatigue.   HENT:  Negative.    Eyes: Negative.    Respiratory: Negative.    Cardiovascular: Negative.    Gastrointestinal: Negative.    Endocrine: Negative.    Genitourinary: Negative.     Musculoskeletal: Negative.    Skin: Negative.    Neurological: Negative.    Hematological: Negative.    Psychiatric/Behavioral: Negative.       A comprehensive 14 point review of systems was performed and was negative except as mentioned.      Medications:  The current medication list was reviewed in the EMR    ALLERGIES:    Allergies   Allergen Reactions    Iodine Anaphylaxis     PT STATES SHE 'PASSED OUT ' AFTER CT CONTRAST.     Dapagliflozin Unknown - Low Severity     severe yeast infection         Physical Exam    VITAL SIGNS:  There were no vitals taken for this visit.    Wt Readings from Last 3 Encounters:   09/23/23 88 kg (194 lb)   01/20/23 89.4 kg (197 lb)   06/13/22 88.9 kg (196 lb)        General: well appearing female in no acute distress  Neuro: alert and oriented  HEENT: sclera anicteric, oropharynx clear  Lymphatics: no cervical, supraclavicular, or axillary adenopathy  Cardiovascular: regular rate and rhythm, no murmurs  Lungs: clear to auscultation bilaterally  Abdomen: soft, nontender, nondistended.  No palpable organomegaly  Extremeties: no lower extremity edema  Skin: no rashes, lesions, bruising, or petechiae  Psych: mood and affect appropriate        RECENT LABS:    Lab Results   Component Value Date    HGB 13.3 09/24/2023    HCT 41.8 09/24/2023    MCV 88.4 09/24/2023     09/24/2023    WBC 4.64 09/24/2023    NEUTROABS 2.96 09/22/2023    LYMPHSABS 1.77 09/22/2023    MONOSABS 0.49 09/22/2023    EOSABS 0.13 09/22/2023    BASOSABS 0.04 09/22/2023       Lab Results   Component Value Date    GLUCOSE 153 (H) 09/24/2023    BUN 13 09/24/2023    CREATININE 0.77 09/24/2023     09/24/2023    K 4.4 09/24/2023     09/24/2023    CO2 28.0  09/24/2023    CALCIUM 8.6 09/24/2023    PROTEINTOT 6.0 09/24/2023    ALBUMIN 3.4 (L) 09/24/2023    BILITOT 1.3 (H) 09/24/2023    ALKPHOS 122 (H) 09/24/2023    AST 19 09/24/2023    ALT 25 09/24/2023             Assessment/Plan  1. Stage IB triple negative ductal carcinoma of the left breast status post lumpectomy. She completed 4 cycles of Taxotere and cytoxan and completed radiation July 31,2019.  She alternates between annual screening mammograms and MRIs.  So every 6 months she has some type of imaging.   At th I ordered MRI and a mammogram today.  A this point she is 5 years out.  I will have her follow-up in survivorship clinic with Ms. Pelayo in [place].     2. Diabetes.  Continue Metformin and Avapro as prescribed by PCP.  She continues to use insulin.  She will follow up with primary care as scheduled.    3. Peripheral neuropathy is Grade 1-2.  Stable.      4. Obesity.  Weight is stable.  She will continue to eat a healthy diet and exercise as tolerated.     5. Skin nodules. Resolved.    6.  She now has pancreatic insufficiency for which she is on Creon.        Jacy Uribe MD  [facility]         No orders of the defined types were placed in this encounter.      2/1/2024

## 2024-02-05 DIAGNOSIS — C50.412 MALIGNANT NEOPLASM OF UPPER-OUTER QUADRANT OF LEFT BREAST IN FEMALE, ESTROGEN RECEPTOR NEGATIVE: Primary | ICD-10-CM

## 2024-02-05 DIAGNOSIS — Z17.1 MALIGNANT NEOPLASM OF UPPER-OUTER QUADRANT OF LEFT BREAST IN FEMALE, ESTROGEN RECEPTOR NEGATIVE: Primary | ICD-10-CM

## 2024-03-11 ENCOUNTER — HOSPITAL ENCOUNTER (OUTPATIENT)
Dept: MAMMOGRAPHY | Facility: HOSPITAL | Age: 69
Discharge: HOME | End: 2024-03-11
Admitting: INTERNAL MEDICINE
Payer: MEDICARE

## 2024-03-11 DIAGNOSIS — Z17.1 MALIGNANT NEOPLASM OF UPPER-OUTER QUADRANT OF LEFT BREAST IN FEMALE, ESTROGEN RECEPTOR NEGATIVE: ICD-10-CM

## 2024-03-11 DIAGNOSIS — C50.412 MALIGNANT NEOPLASM OF UPPER-OUTER QUADRANT OF LEFT BREAST IN FEMALE, ESTROGEN RECEPTOR NEGATIVE: ICD-10-CM

## 2024-03-11 PROCEDURE — 77066 DX MAMMO INCL CAD BI: CPT | Performed by: RADIOLOGY

## 2024-03-11 PROCEDURE — G0279 TOMOSYNTHESIS, MAMMO: HCPCS | Performed by: RADIOLOGY

## 2024-03-11 PROCEDURE — G0279 TOMOSYNTHESIS, MAMMO: HCPCS

## 2024-03-11 PROCEDURE — 77066 DX MAMMO INCL CAD BI: CPT

## 2024-09-16 ENCOUNTER — HOSPITAL ENCOUNTER (OUTPATIENT)
Dept: MRI IMAGING | Facility: HOSPITAL | Age: 69
Discharge: HOME | End: 2024-09-16
Admitting: INTERNAL MEDICINE
Payer: MEDICARE

## 2024-09-16 DIAGNOSIS — Z17.1 MALIGNANT NEOPLASM OF UPPER-OUTER QUADRANT OF LEFT BREAST IN FEMALE, ESTROGEN RECEPTOR NEGATIVE: ICD-10-CM

## 2024-09-16 DIAGNOSIS — C50.412 MALIGNANT NEOPLASM OF UPPER-OUTER QUADRANT OF LEFT BREAST IN FEMALE, ESTROGEN RECEPTOR NEGATIVE: ICD-10-CM

## 2024-09-16 PROCEDURE — C8937 CAD BREAST MRI: HCPCS

## 2024-09-16 PROCEDURE — C8908 MRI W/O FOL W/CONT, BREAST,: HCPCS

## 2024-09-16 PROCEDURE — A9577 INJ MULTIHANCE: HCPCS | Performed by: INTERNAL MEDICINE

## 2024-09-16 PROCEDURE — 0 GADOBENATE DIMEGLUMINE 529 MG/ML SOLUTION: Performed by: INTERNAL MEDICINE

## 2024-09-16 RX ADMIN — GADOBENATE DIMEGLUMINE 19 ML: 529 INJECTION, SOLUTION INTRAVENOUS at 12:55

## 2024-09-17 PROCEDURE — 77049 MRI BREAST C-+ W/CAD BI: CPT | Performed by: RADIOLOGY

## 2025-01-03 ENCOUNTER — TRANSCRIBE ORDERS (OUTPATIENT)
Dept: ADMINISTRATIVE | Facility: HOSPITAL | Age: 70
End: 2025-01-03
Payer: MEDICARE

## 2025-01-03 DIAGNOSIS — Z12.31 VISIT FOR SCREENING MAMMOGRAM: Primary | ICD-10-CM

## 2025-01-30 ENCOUNTER — OFFICE VISIT (OUTPATIENT)
Dept: ONCOLOGY | Facility: CLINIC | Age: 70
End: 2025-01-30
Payer: MEDICARE

## 2025-01-30 VITALS
TEMPERATURE: 97.8 F | HEIGHT: 63 IN | DIASTOLIC BLOOD PRESSURE: 77 MMHG | HEART RATE: 62 BPM | RESPIRATION RATE: 16 BRPM | BODY MASS INDEX: 34.89 KG/M2 | WEIGHT: 196.9 LBS | SYSTOLIC BLOOD PRESSURE: 160 MMHG | OXYGEN SATURATION: 96 %

## 2025-01-30 DIAGNOSIS — Z17.1 MALIGNANT NEOPLASM OF UPPER-OUTER QUADRANT OF LEFT BREAST IN FEMALE, ESTROGEN RECEPTOR NEGATIVE: Primary | ICD-10-CM

## 2025-01-30 DIAGNOSIS — C50.412 MALIGNANT NEOPLASM OF UPPER-OUTER QUADRANT OF LEFT BREAST IN FEMALE, ESTROGEN RECEPTOR NEGATIVE: Primary | ICD-10-CM

## 2025-01-30 PROCEDURE — 1160F RVW MEDS BY RX/DR IN RCRD: CPT | Performed by: NURSE PRACTITIONER

## 2025-01-30 PROCEDURE — 1159F MED LIST DOCD IN RCRD: CPT | Performed by: NURSE PRACTITIONER

## 2025-01-30 PROCEDURE — 99213 OFFICE O/P EST LOW 20 MIN: CPT | Performed by: NURSE PRACTITIONER

## 2025-01-30 PROCEDURE — 3077F SYST BP >= 140 MM HG: CPT | Performed by: NURSE PRACTITIONER

## 2025-01-30 PROCEDURE — 3078F DIAST BP <80 MM HG: CPT | Performed by: NURSE PRACTITIONER

## 2025-01-30 PROCEDURE — 1126F AMNT PAIN NOTED NONE PRSNT: CPT | Performed by: NURSE PRACTITIONER

## 2025-01-30 RX ORDER — IRBESARTAN 150 MG/1
1 TABLET ORAL EVERY 12 HOURS SCHEDULED
COMMUNITY
Start: 2024-11-30

## 2025-01-30 RX ORDER — INSULIN ASPART 100 [IU]/ML
INJECTION, SUSPENSION SUBCUTANEOUS
COMMUNITY
End: 2025-01-30

## 2025-01-30 NOTE — PROGRESS NOTES
PROBLEM LIST:  Oncology/Hematology History   Malignant neoplasm of upper-outer quadrant of left breast in female, estrogen receptor negative   1/15/2019 Initial Diagnosis    Malignant neoplasm of upper-outer quadrant of left breast in female, estrogen receptor negative (CMS/HCC)  Abnormal mammogram and a biopsy-proven poorly differentiated infiltrating ductal carcinoma in the left 3 o'clock position measuring 6 mm.            2/8/2019 Surgery    Surgery  Lumpectomy by Dr. PATINO on 2/8/2019.   Grade 3, high grade 6 mm tumor with margins negative by 2 mm.    Tumor was ER/[place]-negative HER-2/jakob negative and 0 of 1 lymph node was positive.             3/5/2019 Imaging    CT and bone scan scheduled 03/05/2019     3/5/2019 - 5/9/2019 Chemotherapy    OP BREAST TC DOCEtaxel / Cyclophosphamide  Start date 03/07/2019 pending.   Education 03/05/2019  Completed 05/09/2019 6/19/2019 - 7/31/2019 Radiation    Radiation OncologyTreatment Course:  Ava Mota received 5500 cGy in 30 fractions to left breast via External Beam Radiation - EBRT.     1/2/2020 Survivorship    Survivorship Care Plan completed and discussed with patient.  Copy of Survivorship Care Plan provided to patient and discussed with Mila ROBERTO Survivorship plan will be mailed to patient's address.                       REASON FOR VISIT: Triple Negative Breast cancer    HISTORY OF PRESENT ILLNESS:   69 y.o.  female presents today for management of triple negative breast cancer.  She completed 4 cycles of adjuvant chemotherapy with Taxotere and Cytoxan. She completed radiation on July 31st 2019.  She has underlying diabetes.  She also was diagnosed with pancreatic insufficiency for which she is on Creon.      Overall, she is doing fairly well from a cancer standpoint.  No new symptoms or signs that suggest recurrence.  Denies any issues since her last visit.  Her sister was recently diagnosed with lung cancer.  She is concerned about her.      Past  "medical history, social history and family history was reviewed and unchanged from prior visit.    Review of Systems:  Review of Systems   Constitutional:  Positive for fatigue.   All other systems reviewed and are negative.         A comprehensive 14 point review of systems was performed and was negative except as mentioned.      Medications:  The current medication list was reviewed in the EMR    ALLERGIES:    Allergies   Allergen Reactions    Iodine Anaphylaxis     PT STATES SHE 'PASSED OUT ' AFTER CT CONTRAST.     Dapagliflozin Unknown - Low Severity     severe yeast infection         Physical Exam    VITAL SIGNS:  /77   Pulse 62   Temp 97.8 °F (36.6 °C)   Resp 16   Ht 160 cm (62.99\")   Wt 89.3 kg (196 lb 14.4 oz)   SpO2 96%   BMI 34.89 kg/m²     Wt Readings from Last 3 Encounters:   01/30/25 89.3 kg (196 lb 14.4 oz)   02/01/24 92.5 kg (204 lb)   09/16/24 86.6 kg (191 lb)        General: well appearing female in no acute distress  Neuro: alert and oriented  HEENT: sclera anicteric  Lymphatics: no cervical, supraclavicular, or axillary adenopathy  Cardiovascular: regular rate and rhythm, no murmurs  Lungs: clear to auscultation bilaterally  Extremities: no lower extremity edema  Skin: no rashes, lesions, bruising, or petechiae  Psych: mood and affect appropriate      RECENT LABS:    Lab Results   Component Value Date    HGB 13.3 09/24/2023    HCT 41.8 09/24/2023    MCV 88.4 09/24/2023     09/24/2023    WBC 4.64 09/24/2023    NEUTROABS 2.96 09/22/2023    LYMPHSABS 1.77 09/22/2023    MONOSABS 0.49 09/22/2023    EOSABS 0.13 09/22/2023    BASOSABS 0.04 09/22/2023       Lab Results   Component Value Date    GLUCOSE 153 (H) 09/24/2023    BUN 13 09/24/2023    CREATININE 0.77 09/24/2023     09/24/2023    K 4.4 09/24/2023     09/24/2023    CO2 28.0 09/24/2023    CALCIUM 8.6 09/24/2023    PROTEINTOT 6.0 09/24/2023    ALBUMIN 3.4 (L) 09/24/2023    BILITOT 1.3 (H) 09/24/2023    ALKPHOS 122 (H) " 09/24/2023    AST 19 09/24/2023    ALT 25 09/24/2023             Assessment/Plan  1. Stage IB triple negative ductal carcinoma of the left breast status post lumpectomy.   She has completed 4 cycles of Taxotere and Cytoxan.  Completed radiation July 31, 2019.  She continues to alternate between annual screening mammograms and MRIs.  Every 6 months she has some type of imaging.    We discussed MRI from September 2024 was BI-RADS Category 1 negative.  We will continue annual screening breast MRI.  We discussed last mammogram in March 2024 was BI-RADS Category 2.  She will continue annual screening mammography.  Mammogram is scheduled for March 2025.    No recent labs.  We will check CBC and CMP today and I will call her with results.    At this point she is over 5 years out from treatment.  We will continue yearly visits with me.    2. Diabetes.  Continue Avapro as prescribed by PCP.  She continues to use insulin, Novolin.  Continue to follow-up with primary care as scheduled.    3. Peripheral neuropathy is Grade 1-2.  Stable.      4. Obesity.  Weight is stable.  I encouraged her to continue to eat a healthy diet and exercise as tolerated.      5.  Pancreatic insufficiency.  Continue Creon.        PARDEEP Bae  [facility]    Return in (Approximately): 1 year    Orders Placed This Encounter   Procedures    MRI Breast Bilateral Diagnostic W WO Contrast    Comprehensive Metabolic Panel    CBC & Differential       1/30/2025

## 2025-03-17 ENCOUNTER — HOSPITAL ENCOUNTER (OUTPATIENT)
Dept: MAMMOGRAPHY | Facility: HOSPITAL | Age: 70
Discharge: HOME | End: 2025-03-17
Admitting: NURSE PRACTITIONER
Payer: MEDICARE

## 2025-03-17 DIAGNOSIS — Z12.31 VISIT FOR SCREENING MAMMOGRAM: ICD-10-CM

## 2025-03-17 PROCEDURE — 77067 SCR MAMMO BI INCL CAD: CPT

## 2025-03-17 PROCEDURE — 77063 BREAST TOMOSYNTHESIS BI: CPT

## (undated) DEVICE — ST LINER SAFECAP GRN RED CP STRL

## (undated) DEVICE — SYR LUERLOK 50ML

## (undated) DEVICE — CONTN GRAD MEAS TRIANG 32OZ BLK

## (undated) DEVICE — KT ORCA ORCAPOD DISP STRL

## (undated) DEVICE — LUBE JELLY FOIL PACK 1.4 OZ: Brand: MEDLINE INDUSTRIES, INC.

## (undated) DEVICE — HYBRID CO2 TUBING/CAP SET FOR OLYMPUS® SCOPES & CO2 SOURCE: Brand: ERBE

## (undated) DEVICE — ADAPT CLN LUM OLYMP AIR/H20

## (undated) DEVICE — TUBING, SUCTION, 1/4" X 10', STRAIGHT: Brand: MEDLINE

## (undated) DEVICE — FIRST STEP BEDSIDE ADD WATER KIT - RESEALABLE STAND-UP POUCH, ENDOSCOPIC CLEANING PAD - 1 POUCH: Brand: FIRST STEP BEDSIDE ADD WATER KIT - RESEALABLE STAND-UP POUCH, ENDOSCOPIC CLEANIN

## (undated) DEVICE — THE BITE BLOCK MAXI, LATEX FREE STRAP IS USED TO PROTECT THE ENDOSCOPE INSERTION TUBE FROM BEING BITTEN BY THE PATIENT.

## (undated) DEVICE — SOL IRR H2O BTL 1000ML STRL

## (undated) DEVICE — SOLIDIFIER LIQ PREMISORB 1500CC

## (undated) DEVICE — INTRO ACCSR BLNT TP